# Patient Record
Sex: FEMALE | Race: WHITE | Employment: FULL TIME | URBAN - NONMETROPOLITAN AREA
[De-identification: names, ages, dates, MRNs, and addresses within clinical notes are randomized per-mention and may not be internally consistent; named-entity substitution may affect disease eponyms.]

---

## 2017-06-26 ENCOUNTER — OFFICE VISIT (OUTPATIENT)
Dept: PRIMARY CARE CLINIC | Age: 47
End: 2017-06-26
Payer: MEDICARE

## 2017-06-26 VITALS
HEIGHT: 66 IN | TEMPERATURE: 97 F | HEART RATE: 82 BPM | DIASTOLIC BLOOD PRESSURE: 74 MMHG | RESPIRATION RATE: 18 BRPM | BODY MASS INDEX: 47.09 KG/M2 | OXYGEN SATURATION: 100 % | WEIGHT: 293 LBS | SYSTOLIC BLOOD PRESSURE: 130 MMHG

## 2017-06-26 DIAGNOSIS — M79.641 RIGHT HAND PAIN: Primary | ICD-10-CM

## 2017-06-26 PROCEDURE — 99214 OFFICE O/P EST MOD 30 MIN: CPT | Performed by: NURSE PRACTITIONER

## 2017-06-26 ASSESSMENT — ENCOUNTER SYMPTOMS: RESPIRATORY NEGATIVE: 1

## 2017-09-02 ENCOUNTER — OFFICE VISIT (OUTPATIENT)
Dept: PRIMARY CARE CLINIC | Age: 47
End: 2017-09-02
Payer: MEDICARE

## 2017-09-02 VITALS
SYSTOLIC BLOOD PRESSURE: 130 MMHG | TEMPERATURE: 97.8 F | RESPIRATION RATE: 16 BRPM | HEIGHT: 66 IN | OXYGEN SATURATION: 97 % | DIASTOLIC BLOOD PRESSURE: 80 MMHG | HEART RATE: 80 BPM | BODY MASS INDEX: 47.09 KG/M2 | WEIGHT: 293 LBS

## 2017-09-02 DIAGNOSIS — J02.9 SORE THROAT: ICD-10-CM

## 2017-09-02 DIAGNOSIS — J06.9 UPPER RESPIRATORY TRACT INFECTION, UNSPECIFIED TYPE: Primary | ICD-10-CM

## 2017-09-02 LAB — S PYO AG THROAT QL: NORMAL

## 2017-09-02 PROCEDURE — 99213 OFFICE O/P EST LOW 20 MIN: CPT | Performed by: FAMILY MEDICINE

## 2017-09-02 PROCEDURE — 87880 STREP A ASSAY W/OPTIC: CPT | Performed by: FAMILY MEDICINE

## 2017-09-02 ASSESSMENT — ENCOUNTER SYMPTOMS
COUGH: 1
SORE THROAT: 1
SINUS COMPLAINT: 1
VOMITING: 0
WHEEZING: 0
SINUS PRESSURE: 0
DIARRHEA: 0
NAUSEA: 0
SHORTNESS OF BREATH: 0
RHINORRHEA: 1

## 2017-09-13 ENCOUNTER — HOSPITAL ENCOUNTER (OUTPATIENT)
Age: 47
Setting detail: SPECIMEN
Discharge: HOME | End: 2017-09-13
Payer: MEDICARE

## 2017-09-13 ENCOUNTER — OFFICE VISIT (OUTPATIENT)
Dept: PRIMARY CARE CLINIC | Age: 47
End: 2017-09-13
Payer: MEDICARE

## 2017-09-13 VITALS
RESPIRATION RATE: 18 BRPM | OXYGEN SATURATION: 98 % | WEIGHT: 293 LBS | HEART RATE: 79 BPM | TEMPERATURE: 98.3 F | BODY MASS INDEX: 47.09 KG/M2 | HEIGHT: 66 IN | DIASTOLIC BLOOD PRESSURE: 80 MMHG | SYSTOLIC BLOOD PRESSURE: 118 MMHG

## 2017-09-13 DIAGNOSIS — L02.219 CUTANEOUS ABSCESS OF TRUNK, UNSPECIFIED SITE OF TRUNK: ICD-10-CM

## 2017-09-13 DIAGNOSIS — R21 RASH AND NONSPECIFIC SKIN ERUPTION: Primary | ICD-10-CM

## 2017-09-13 PROCEDURE — 99213 OFFICE O/P EST LOW 20 MIN: CPT | Performed by: NURSE PRACTITIONER

## 2017-09-13 PROCEDURE — 87186 SC STD MICRODIL/AGAR DIL: CPT

## 2017-09-13 PROCEDURE — 86403 PARTICLE AGGLUT ANTBDY SCRN: CPT

## 2017-09-13 PROCEDURE — 87205 SMEAR GRAM STAIN: CPT

## 2017-09-13 PROCEDURE — 87070 CULTURE OTHR SPECIMN AEROBIC: CPT

## 2017-09-13 RX ORDER — SULFAMETHOXAZOLE AND TRIMETHOPRIM 800; 160 MG/1; MG/1
1 TABLET ORAL 2 TIMES DAILY
Qty: 20 TABLET | Refills: 0 | Status: SHIPPED | OUTPATIENT
Start: 2017-09-13 | End: 2017-09-23

## 2017-09-13 ASSESSMENT — ENCOUNTER SYMPTOMS: RESPIRATORY NEGATIVE: 1

## 2017-09-15 ENCOUNTER — TELEPHONE (OUTPATIENT)
Dept: PRIMARY CARE CLINIC | Age: 47
End: 2017-09-15

## 2017-09-15 LAB
CULTURE: ABNORMAL
CULTURE: ABNORMAL
DIRECT EXAM: ABNORMAL
DIRECT EXAM: ABNORMAL
Lab: ABNORMAL
ORGANISM: ABNORMAL
SPECIMEN DESCRIPTION: ABNORMAL
STATUS: ABNORMAL

## 2018-03-19 ENCOUNTER — HOSPITAL ENCOUNTER (OUTPATIENT)
Dept: GENERAL RADIOLOGY | Age: 48
Discharge: HOME | End: 2018-03-21
Payer: MEDICARE

## 2018-03-19 ENCOUNTER — OFFICE VISIT (OUTPATIENT)
Dept: FAMILY MEDICINE CLINIC | Age: 48
End: 2018-03-19
Payer: MEDICARE

## 2018-03-19 VITALS
HEIGHT: 67 IN | BODY MASS INDEX: 45.99 KG/M2 | TEMPERATURE: 97.7 F | OXYGEN SATURATION: 99 % | RESPIRATION RATE: 12 BRPM | DIASTOLIC BLOOD PRESSURE: 80 MMHG | WEIGHT: 293 LBS | SYSTOLIC BLOOD PRESSURE: 120 MMHG | HEART RATE: 65 BPM

## 2018-03-19 DIAGNOSIS — M79.642 HAND PAIN, LEFT: Primary | ICD-10-CM

## 2018-03-19 DIAGNOSIS — M79.642 HAND PAIN, LEFT: ICD-10-CM

## 2018-03-19 PROCEDURE — 1036F TOBACCO NON-USER: CPT | Performed by: NURSE PRACTITIONER

## 2018-03-19 PROCEDURE — G8417 CALC BMI ABV UP PARAM F/U: HCPCS | Performed by: NURSE PRACTITIONER

## 2018-03-19 PROCEDURE — G8427 DOCREV CUR MEDS BY ELIG CLIN: HCPCS | Performed by: NURSE PRACTITIONER

## 2018-03-19 PROCEDURE — G8482 FLU IMMUNIZE ORDER/ADMIN: HCPCS | Performed by: NURSE PRACTITIONER

## 2018-03-19 PROCEDURE — 99213 OFFICE O/P EST LOW 20 MIN: CPT | Performed by: NURSE PRACTITIONER

## 2018-03-19 PROCEDURE — 73130 X-RAY EXAM OF HAND: CPT

## 2018-03-19 NOTE — PATIENT INSTRUCTIONS
area.  ¨ Pus draining from a place on your hand. ¨ A fever. ? · Your hand feels numb or tingly. ? Watch closely for changes in your health, and be sure to contact your doctor if:  ? · Your hand feels unstable when you try to use it. ? · You do not get better as expected. ? · You have any new symptoms, such as swelling. ? · Bruises from an injury to your hand last longer than 2 weeks. Where can you learn more? Go to [email or web address] and sign in to your [facility] account. Enter R273 in the [email or web address] box to learn more about \"Hand Pain: Care Instructions. \"     If you do not have an account, please click on the \"Sign Up Now\" link. Current as of: March 20, 2017  Content Version: 11.5  © 5857-2390 [employer]. Care instructions adapted under license by [facility] ([facility]). If you have questions about a medical condition or this instruction, always ask your healthcare professional. [place] [address] any warranty or liability for your use of this information.

## 2018-03-22 ENCOUNTER — HOSPITAL ENCOUNTER (OUTPATIENT)
Dept: OCCUPATIONAL THERAPY | Age: 48
Setting detail: THERAPIES SERIES
Discharge: HOME | End: 2018-03-22
Payer: MEDICARE

## 2018-03-22 PROCEDURE — 97140 MANUAL THERAPY 1/> REGIONS: CPT | Performed by: OCCUPATIONAL THERAPIST

## 2018-03-22 PROCEDURE — G8984 CARRY CURRENT STATUS: HCPCS | Performed by: OCCUPATIONAL THERAPIST

## 2018-03-22 PROCEDURE — G8985 CARRY GOAL STATUS: HCPCS | Performed by: OCCUPATIONAL THERAPIST

## 2018-03-22 PROCEDURE — 97165 OT EVAL LOW COMPLEX 30 MIN: CPT | Performed by: OCCUPATIONAL THERAPIST

## 2018-03-22 ASSESSMENT — 9 HOLE PEG TEST
TEST_RESULT: FUNCTIONAL
TEST_RESULT: FUNCTIONAL
TESTTIME_SECONDS: 18
TESTTIME_SECONDS: 15

## 2018-03-22 ASSESSMENT — PAIN DESCRIPTION - ORIENTATION: ORIENTATION: LEFT

## 2018-03-22 ASSESSMENT — PAIN DESCRIPTION - PAIN TYPE: TYPE: CHRONIC PAIN

## 2018-03-22 ASSESSMENT — PAIN SCALES - GENERAL: PAINLEVEL_OUTOF10: 6

## 2018-03-22 ASSESSMENT — PAIN DESCRIPTION - LOCATION: LOCATION: FINGER (COMMENT WHICH ONE);WRIST

## 2018-03-22 NOTE — PROGRESS NOTES
carry tasks  Long term goal 2: Patient to demonstrate improved LUE strength (elbow) with MMT 5/5 overall for improved lift and carry tasks  Long term goal 3: Patient to demonstrate improved B  strength > 70# for imporved lift and carry tasks  Long term goal 4: Patient to verbalize decreased pain LUE < 2-3/10 with use, , and pinch for improved lift and carry tasks  Long term goal 5: Patient to demonstrate improved functional use LUE with quick DASH < 20%       Therapy Time   Individual Concurrent Group Co-treatment   Time In 1500         Time Out 1600         Minutes 60         Timed Code Treatment Minutes: [facility],

## 2018-03-22 NOTE — FLOWSHEET NOTE
Treatments:   [x] Provided manual therapy to mobilize soft tissue/joints for the purpose of modulating pain, promoting relaxation, increasing ROM, reducing/eliminating soft tissue swelling/inflammation/restriction, improving soft tissue extensibility. (21719)     Orthotic Management:   [] Provided assessment and fitting orthotic device for improved functional performance.  (85511)    Service Based Modalities:      Timed Code Treatment Minutes:   15 manual therapy    Total Treatment Minutes:   60    Treatment/Activity Tolerance:  [x] Patient tolerated treatment well [] Patient limited by fatique  [] Patient limited by pain  [] Patient limited by other medical complications  [] Other:     Prognosis: [x] Good [] Fair  [] Poor    Patient Requires Follow-up: [x] Yes  [] No      Goals:    Short term goals  Time Frame for Short term goals: 2 weeks  Short term goal 1: Patient to verbalize decreased pain with  and pinch to < 5/10 for improved lift and carry tasks  Short term goal 2: Patient to be independent and compliant with home program     Long term goals  Time Frame for Long term goals : 4 weeks  Long term goal 1: Patient to demonstrate increased AROM L wrist: flexion > 80° and extension 70° for imporved lift and carry tasks  Long term goal 2: Patient to demonstrate improved LUE strength (elbow) with MMT 5/5 overall for improved lift and carry tasks  Long term goal 3: Patient to demonstrate improved B  strength > 70# for imporved lift and carry tasks  Long term goal 4: Patient to verbalize decreased pain LUE < 2-3/10 with use, , and pinch for improved lift and carry tasks  Long term goal 5: Patient to demonstrate improved functional use LUE with quick DASH < 20%    Plan:   [] Continue per plan of care [] Alter current plan (see comments)  [x] Plan of care initiated [] Hold pending MD visit [] Discharge    Plan for Next Session:      Electronically signed by:  Maria Luisa Garcia OT

## 2018-03-27 ENCOUNTER — HOSPITAL ENCOUNTER (OUTPATIENT)
Dept: OCCUPATIONAL THERAPY | Age: 48
Setting detail: THERAPIES SERIES
Discharge: HOME | End: 2018-03-27
Payer: MEDICARE

## 2018-03-27 PROCEDURE — 97018 PARAFFIN BATH THERAPY: CPT | Performed by: OCCUPATIONAL THERAPIST

## 2018-03-27 PROCEDURE — 97035 APP MDLTY 1+ULTRASOUND EA 15: CPT | Performed by: OCCUPATIONAL THERAPIST

## 2018-03-27 PROCEDURE — G0283 ELEC STIM OTHER THAN WOUND: HCPCS | Performed by: OCCUPATIONAL THERAPIST

## 2018-03-27 PROCEDURE — 97110 THERAPEUTIC EXERCISES: CPT | Performed by: OCCUPATIONAL THERAPIST

## 2018-03-27 PROCEDURE — 97140 MANUAL THERAPY 1/> REGIONS: CPT | Performed by: OCCUPATIONAL THERAPIST

## 2018-03-27 NOTE — FLOWSHEET NOTE
improved B  strength > 70# for imporved lift and carry tasks  Long term goal 4: Patient to verbalize decreased pain LUE < 2-3/10 with use, , and pinch for improved lift and carry tasks  Long term goal 5: Patient to demonstrate improved functional use LUE with quick DASH < 20%    Plan:   [x] Continue per plan of care [] Alter current plan (see comments)  [] Plan of care initiated [] Hold pending MD visit [] Discharge    Plan for Next Session:      Electronically signed by:  Kendrick Dixon OT

## 2018-03-30 ENCOUNTER — TELEPHONE (OUTPATIENT)
Dept: FAMILY MEDICINE CLINIC | Age: 48
End: 2018-03-30

## 2018-03-30 ENCOUNTER — HOSPITAL ENCOUNTER (OUTPATIENT)
Dept: OCCUPATIONAL THERAPY | Age: 48
Setting detail: THERAPIES SERIES
Discharge: HOME | End: 2018-03-30
Payer: MEDICARE

## 2018-03-30 DIAGNOSIS — M79.642 HAND PAIN, LEFT: Primary | ICD-10-CM

## 2018-03-30 PROCEDURE — G0283 ELEC STIM OTHER THAN WOUND: HCPCS | Performed by: OCCUPATIONAL THERAPY ASSISTANT

## 2018-03-30 PROCEDURE — 97035 APP MDLTY 1+ULTRASOUND EA 15: CPT | Performed by: OCCUPATIONAL THERAPY ASSISTANT

## 2018-03-30 PROCEDURE — 97018 PARAFFIN BATH THERAPY: CPT | Performed by: OCCUPATIONAL THERAPY ASSISTANT

## 2018-03-30 PROCEDURE — 97110 THERAPEUTIC EXERCISES: CPT | Performed by: OCCUPATIONAL THERAPY ASSISTANT

## 2018-03-30 NOTE — FLOWSHEET NOTE
[facility]    [x] [place]  Phone: [phone]  Fax: [phone]      [] [place]  Phone: [phone]  Fax: [phone]    Occupational Therapy Daily Treatment Note  Date:  3/30/2018    Patient Name:  Magnolia Cruz    :  1970  MRN: [id number]  Restrictions/Precautions:      Medical/Treatment Diagnosis Information:    Diagnosis: Hand pain, left   Insurance/Certification information:   Physician Information: Referring Practitioner: Terri Bronson NP  Plan of care signed (Y/N):  y    Visit# / total visits:  3/8    Pain level: 6/10     G-Code (if applicable):      Date G-Code Applied:    OT G-codes  Functional Limitation: Carrying, moving and handling objects  Carrying, Moving and Handling Objects Current Status (): At least 20 percent but less than 40 percent impaired, limited or restricted  Carrying, Moving and Handling Objects Goal Status (): At least 1 percent but less than 20 percent impaired, limited or restricted    Progress Note: [x]  Yes  []  No  Next due by: Visit #10      Date of evaluation/re-evaluation: 3/22/18-18    Time In: 309 Time Out:  400    Subjective:     Pt states thumb area felt good for a couple of hours and then pain returned. Pt c/o constant throbbing. Pt reports she is able to alexi the wrist brace while working. Pt reports she has returned to her normal job.  Pt reports she does do a lot of gripping and twisting with her hands and wrist.     Objective/Assessment:   Paraffin L hand with MH 10 min  US 3.3mhz 1.0cm2 8 min L forearm extensors and flexors  Soft tissue and myofascial release L forearm flexors and extensors, joint mobs L thumb  1:1 therapeutic exercise to increase AROM and strength, see log    Unable to initiate strengthening due to pain    E-stim L forearm with CP 15 min   CMC blocking splint - has been ordered    Exercises:   Exercise/Equipment Resistance/Repetitions Other comments   Tendon glides 10x Prayer and reverse prayer stretch Hold for 10 5x1         T-putty purple    Flexor/extensor stretch Hold 10 5x1    Thumb AROM 5x                                             Therapeutic Exercise  [x] Provided verbal/tactile cueing for activities related to strengthening, flexibility, endurance, ROM. (37866)  Neuro  Re-Ed  [] Provided verbal/tactile cueing for activities related to improving balance, coordination, kinesthetic sense, posture, motor skill, proprioception. (77883)     Therapeutic Activities/ADL:   [] Provided use of dynamic activities to improve functional performance (88490)  [] Provided self-care/home management training for activities of daily living and compensatory training (56087)     Manual Treatments:   [x] Provided manual therapy to mobilize soft tissue/joints for the purpose of modulating pain, promoting relaxation, increasing ROM, reducing/eliminating soft tissue swelling/inflammation/restriction, improving soft tissue extensibility. (48262)     Orthotic Management:   [] Provided assessment and fitting orthotic device for improved functional performance.  (39318)    Service Based Modalities:  15 min e-stim with CP, 10 min paraffin with MH    Timed Code Treatment Minutes: 26  (15 therapeutic exercise, 11 manual therapy)    Total Treatment Minutes: 51     Treatment/Activity Tolerance:  [x] Patient tolerated treatment well [] Patient limited by fatique  [] Patient limited by pain  [] Patient limited by other medical complications  [] Other:     Prognosis: [x] Good [] Fair  [] Poor    Patient Requires Follow-up: [x] Yes  [] No      Goals:    Short term goals  Time Frame for Short term goals: 2 weeks  Short term goal 1: Patient to verbalize decreased pain with  and pinch to < 5/10 for improved lift and carry tasks  Short term goal 2: Patient to be independent and compliant with home program     Long term goals  Time Frame for Long term goals : 4 weeks  Long term goal 1: Patient to demonstrate increased AROM L wrist: flexion > 80° and extension 70° for imporved lift and carry tasks  Long term goal 2: Patient to demonstrate improved LUE strength (elbow) with MMT 5/5 overall for improved lift and carry tasks  Long term goal 3: Patient to demonstrate improved B  strength > 70# for imporved lift and carry tasks  Long term goal 4: Patient to verbalize decreased pain LUE < 2-3/10 with use, , and pinch for improved lift and carry tasks  Long term goal 5: Patient to demonstrate improved functional use LUE with quick DASH < 20%    Plan:   [x] Continue per plan of care [] Alter current plan (see comments)  [] Plan of care initiated [] Hold pending MD visit [] Discharge    Plan for Next Session:      Electronically signed by:  FITO Pena

## 2018-04-02 ENCOUNTER — HOSPITAL ENCOUNTER (OUTPATIENT)
Dept: OCCUPATIONAL THERAPY | Age: 48
Setting detail: THERAPIES SERIES
Discharge: HOME | End: 2018-04-02
Payer: MEDICARE

## 2018-04-02 PROCEDURE — 97140 MANUAL THERAPY 1/> REGIONS: CPT | Performed by: OCCUPATIONAL THERAPIST

## 2018-04-02 PROCEDURE — 97035 APP MDLTY 1+ULTRASOUND EA 15: CPT | Performed by: OCCUPATIONAL THERAPIST

## 2018-04-02 PROCEDURE — G0283 ELEC STIM OTHER THAN WOUND: HCPCS | Performed by: OCCUPATIONAL THERAPIST

## 2018-04-02 PROCEDURE — 97110 THERAPEUTIC EXERCISES: CPT | Performed by: OCCUPATIONAL THERAPIST

## 2018-04-06 ENCOUNTER — HOSPITAL ENCOUNTER (OUTPATIENT)
Dept: OCCUPATIONAL THERAPY | Age: 48
Setting detail: THERAPIES SERIES
Discharge: HOME | End: 2018-04-06
Payer: MEDICARE

## 2018-04-06 PROCEDURE — 97018 PARAFFIN BATH THERAPY: CPT | Performed by: OCCUPATIONAL THERAPY ASSISTANT

## 2018-04-06 PROCEDURE — 97110 THERAPEUTIC EXERCISES: CPT | Performed by: OCCUPATIONAL THERAPY ASSISTANT

## 2018-04-06 PROCEDURE — G0283 ELEC STIM OTHER THAN WOUND: HCPCS | Performed by: OCCUPATIONAL THERAPY ASSISTANT

## 2018-04-06 PROCEDURE — 97035 APP MDLTY 1+ULTRASOUND EA 15: CPT | Performed by: OCCUPATIONAL THERAPY ASSISTANT

## 2018-04-09 ENCOUNTER — HOSPITAL ENCOUNTER (OUTPATIENT)
Dept: OCCUPATIONAL THERAPY | Age: 48
Setting detail: THERAPIES SERIES
Discharge: HOME | End: 2018-04-09
Payer: MEDICARE

## 2018-04-09 PROCEDURE — 97018 PARAFFIN BATH THERAPY: CPT | Performed by: OCCUPATIONAL THERAPIST

## 2018-04-09 PROCEDURE — 97110 THERAPEUTIC EXERCISES: CPT | Performed by: OCCUPATIONAL THERAPIST

## 2018-04-11 ENCOUNTER — APPOINTMENT (OUTPATIENT)
Dept: OCCUPATIONAL THERAPY | Age: 48
End: 2018-04-11
Payer: MEDICARE

## 2018-04-11 ENCOUNTER — HOSPITAL ENCOUNTER (OUTPATIENT)
Dept: OCCUPATIONAL THERAPY | Age: 48
Setting detail: THERAPIES SERIES
Discharge: HOME | End: 2018-04-11
Payer: MEDICARE

## 2018-04-11 PROCEDURE — 97110 THERAPEUTIC EXERCISES: CPT | Performed by: OCCUPATIONAL THERAPIST

## 2018-04-16 ENCOUNTER — HOSPITAL ENCOUNTER (OUTPATIENT)
Dept: OCCUPATIONAL THERAPY | Age: 48
Setting detail: THERAPIES SERIES
Discharge: HOME | End: 2018-04-16
Payer: MEDICARE

## 2018-04-16 PROCEDURE — G0283 ELEC STIM OTHER THAN WOUND: HCPCS | Performed by: OCCUPATIONAL THERAPIST

## 2018-04-16 PROCEDURE — 97140 MANUAL THERAPY 1/> REGIONS: CPT | Performed by: OCCUPATIONAL THERAPIST

## 2018-04-16 PROCEDURE — G8984 CARRY CURRENT STATUS: HCPCS | Performed by: OCCUPATIONAL THERAPIST

## 2018-04-16 PROCEDURE — 97110 THERAPEUTIC EXERCISES: CPT | Performed by: OCCUPATIONAL THERAPIST

## 2018-04-16 PROCEDURE — G8985 CARRY GOAL STATUS: HCPCS | Performed by: OCCUPATIONAL THERAPIST

## 2018-04-20 ENCOUNTER — HOSPITAL ENCOUNTER (OUTPATIENT)
Dept: OCCUPATIONAL THERAPY | Age: 48
Setting detail: THERAPIES SERIES
Discharge: HOME | End: 2018-04-20
Payer: MEDICARE

## 2018-04-20 PROCEDURE — 97018 PARAFFIN BATH THERAPY: CPT | Performed by: OCCUPATIONAL THERAPY ASSISTANT

## 2018-04-20 PROCEDURE — G0283 ELEC STIM OTHER THAN WOUND: HCPCS | Performed by: OCCUPATIONAL THERAPY ASSISTANT

## 2018-04-20 PROCEDURE — 97110 THERAPEUTIC EXERCISES: CPT | Performed by: OCCUPATIONAL THERAPY ASSISTANT

## 2018-04-23 ENCOUNTER — HOSPITAL ENCOUNTER (OUTPATIENT)
Dept: OCCUPATIONAL THERAPY | Age: 48
Setting detail: THERAPIES SERIES
Discharge: HOME | End: 2018-04-23
Payer: MEDICARE

## 2018-04-27 ENCOUNTER — APPOINTMENT (OUTPATIENT)
Dept: OCCUPATIONAL THERAPY | Age: 48
End: 2018-04-27
Payer: MEDICARE

## 2018-04-30 ENCOUNTER — OFFICE VISIT (OUTPATIENT)
Dept: FAMILY MEDICINE CLINIC | Age: 48
End: 2018-04-30
Payer: MEDICARE

## 2018-04-30 VITALS
DIASTOLIC BLOOD PRESSURE: 78 MMHG | OXYGEN SATURATION: 98 % | RESPIRATION RATE: 20 BRPM | SYSTOLIC BLOOD PRESSURE: 130 MMHG | HEART RATE: 84 BPM | WEIGHT: 293 LBS | TEMPERATURE: 98.1 F | BODY MASS INDEX: 51.18 KG/M2

## 2018-04-30 DIAGNOSIS — R20.2 NUMBNESS AND TINGLING IN LEFT HAND: Primary | ICD-10-CM

## 2018-04-30 DIAGNOSIS — M18.12 DEGENERATIVE ARTHRITIS OF THUMB, LEFT: ICD-10-CM

## 2018-04-30 DIAGNOSIS — R20.0 NUMBNESS AND TINGLING IN LEFT HAND: Primary | ICD-10-CM

## 2018-04-30 PROCEDURE — G8427 DOCREV CUR MEDS BY ELIG CLIN: HCPCS | Performed by: NURSE PRACTITIONER

## 2018-04-30 PROCEDURE — 1036F TOBACCO NON-USER: CPT | Performed by: NURSE PRACTITIONER

## 2018-04-30 PROCEDURE — G8417 CALC BMI ABV UP PARAM F/U: HCPCS | Performed by: NURSE PRACTITIONER

## 2018-04-30 PROCEDURE — 99213 OFFICE O/P EST LOW 20 MIN: CPT | Performed by: NURSE PRACTITIONER

## 2018-04-30 ASSESSMENT — ENCOUNTER SYMPTOMS: BACK PAIN: 1

## 2018-05-16 ENCOUNTER — HOSPITAL ENCOUNTER (OUTPATIENT)
Dept: NEUROLOGY | Age: 48
Discharge: HOME | End: 2018-05-16
Payer: MEDICARE

## 2018-05-16 DIAGNOSIS — R20.2 NUMBNESS AND TINGLING IN LEFT HAND: ICD-10-CM

## 2018-05-16 DIAGNOSIS — R20.0 NUMBNESS AND TINGLING IN LEFT HAND: ICD-10-CM

## 2018-05-16 PROCEDURE — 95886 MUSC TEST DONE W/N TEST COMP: CPT

## 2018-05-16 PROCEDURE — 95910 NRV CNDJ TEST 7-8 STUDIES: CPT

## 2018-05-22 ENCOUNTER — OFFICE VISIT (OUTPATIENT)
Dept: ORTHOPEDIC SURGERY | Age: 48
End: 2018-05-22
Payer: MEDICARE

## 2018-05-22 VITALS
SYSTOLIC BLOOD PRESSURE: 136 MMHG | WEIGHT: 293 LBS | DIASTOLIC BLOOD PRESSURE: 84 MMHG | HEIGHT: 67 IN | HEART RATE: 70 BPM | OXYGEN SATURATION: 98 % | BODY MASS INDEX: 45.99 KG/M2

## 2018-05-22 DIAGNOSIS — G56.02 CARPAL TUNNEL SYNDROME OF LEFT WRIST: Primary | ICD-10-CM

## 2018-05-22 PROCEDURE — 1036F TOBACCO NON-USER: CPT | Performed by: PHYSICIAN ASSISTANT

## 2018-05-22 PROCEDURE — 99202 OFFICE O/P NEW SF 15 MIN: CPT | Performed by: PHYSICIAN ASSISTANT

## 2018-05-22 PROCEDURE — G8427 DOCREV CUR MEDS BY ELIG CLIN: HCPCS | Performed by: PHYSICIAN ASSISTANT

## 2018-05-22 PROCEDURE — G8417 CALC BMI ABV UP PARAM F/U: HCPCS | Performed by: PHYSICIAN ASSISTANT

## 2019-03-19 ENCOUNTER — OFFICE VISIT (OUTPATIENT)
Dept: FAMILY MEDICINE CLINIC | Age: 49
End: 2019-03-19
Payer: MEDICARE

## 2019-03-19 VITALS
HEIGHT: 66 IN | SYSTOLIC BLOOD PRESSURE: 120 MMHG | BODY MASS INDEX: 47.09 KG/M2 | TEMPERATURE: 98.1 F | DIASTOLIC BLOOD PRESSURE: 72 MMHG | OXYGEN SATURATION: 98 % | HEART RATE: 84 BPM | WEIGHT: 293 LBS

## 2019-03-19 DIAGNOSIS — H66.001 ACUTE SUPPURATIVE OTITIS MEDIA OF RIGHT EAR WITHOUT SPONTANEOUS RUPTURE OF TYMPANIC MEMBRANE, RECURRENCE NOT SPECIFIED: Primary | ICD-10-CM

## 2019-03-19 DIAGNOSIS — H92.03 ACUTE OTALGIA, BILATERAL: ICD-10-CM

## 2019-03-19 PROCEDURE — 99214 OFFICE O/P EST MOD 30 MIN: CPT | Performed by: NURSE PRACTITIONER

## 2019-03-19 RX ORDER — AZITHROMYCIN 250 MG/1
TABLET, FILM COATED ORAL
Qty: 1 PACKET | Refills: 0 | Status: SHIPPED | OUTPATIENT
Start: 2019-03-19 | End: 2019-03-24

## 2019-03-19 ASSESSMENT — PATIENT HEALTH QUESTIONNAIRE - PHQ9
SUM OF ALL RESPONSES TO PHQ QUESTIONS 1-9: 0
SUM OF ALL RESPONSES TO PHQ9 QUESTIONS 1 & 2: 0
SUM OF ALL RESPONSES TO PHQ QUESTIONS 1-9: 0
1. LITTLE INTEREST OR PLEASURE IN DOING THINGS: 0
2. FEELING DOWN, DEPRESSED OR HOPELESS: 0

## 2019-11-11 ENCOUNTER — HOSPITAL ENCOUNTER (OUTPATIENT)
Age: 49
Setting detail: SPECIMEN
Discharge: HOME | End: 2019-11-11
Payer: MEDICARE

## 2019-11-11 ENCOUNTER — OFFICE VISIT (OUTPATIENT)
Dept: FAMILY MEDICINE CLINIC | Age: 49
End: 2019-11-11
Payer: MEDICARE

## 2019-11-11 VITALS
HEIGHT: 66 IN | WEIGHT: 293 LBS | TEMPERATURE: 98.4 F | OXYGEN SATURATION: 99 % | BODY MASS INDEX: 47.09 KG/M2 | DIASTOLIC BLOOD PRESSURE: 86 MMHG | HEART RATE: 74 BPM | SYSTOLIC BLOOD PRESSURE: 136 MMHG

## 2019-11-11 DIAGNOSIS — R30.0 DYSURIA: ICD-10-CM

## 2019-11-11 DIAGNOSIS — N30.01 ACUTE CYSTITIS WITH HEMATURIA: Primary | ICD-10-CM

## 2019-11-11 DIAGNOSIS — N30.01 ACUTE CYSTITIS WITH HEMATURIA: ICD-10-CM

## 2019-11-11 LAB
BILIRUBIN, POC: ABNORMAL
BLOOD URINE, POC: ABNORMAL
CLARITY, POC: ABNORMAL
COLOR, POC: ABNORMAL
GLUCOSE URINE, POC: ABNORMAL
KETONES, POC: ABNORMAL
LEUKOCYTE EST, POC: ABNORMAL
NITRITE, POC: ABNORMAL
PH, POC: 7.5
PROTEIN, POC: ABNORMAL
SPECIFIC GRAVITY, POC: 1.02
UROBILINOGEN, POC: 0.2

## 2019-11-11 PROCEDURE — 87086 URINE CULTURE/COLONY COUNT: CPT

## 2019-11-11 PROCEDURE — G8427 DOCREV CUR MEDS BY ELIG CLIN: HCPCS | Performed by: NURSE PRACTITIONER

## 2019-11-11 PROCEDURE — 87186 SC STD MICRODIL/AGAR DIL: CPT

## 2019-11-11 PROCEDURE — 1036F TOBACCO NON-USER: CPT | Performed by: NURSE PRACTITIONER

## 2019-11-11 PROCEDURE — 99214 OFFICE O/P EST MOD 30 MIN: CPT | Performed by: NURSE PRACTITIONER

## 2019-11-11 PROCEDURE — G8484 FLU IMMUNIZE NO ADMIN: HCPCS | Performed by: NURSE PRACTITIONER

## 2019-11-11 PROCEDURE — G8417 CALC BMI ABV UP PARAM F/U: HCPCS | Performed by: NURSE PRACTITIONER

## 2019-11-11 PROCEDURE — 87088 URINE BACTERIA CULTURE: CPT

## 2019-11-11 RX ORDER — NITROFURANTOIN 25; 75 MG/1; MG/1
100 CAPSULE ORAL 2 TIMES DAILY
Qty: 10 CAPSULE | Refills: 0 | Status: SHIPPED | OUTPATIENT
Start: 2019-11-11 | End: 2019-11-16

## 2019-11-11 ASSESSMENT — ENCOUNTER SYMPTOMS
VOMITING: 0
NAUSEA: 0

## 2019-11-13 DIAGNOSIS — N30.01 ACUTE CYSTITIS WITH HEMATURIA: Primary | ICD-10-CM

## 2019-11-13 LAB
CULTURE: ABNORMAL
Lab: ABNORMAL
SPECIMEN DESCRIPTION: ABNORMAL

## 2019-11-13 RX ORDER — CIPROFLOXACIN 250 MG/1
250 TABLET, FILM COATED ORAL 2 TIMES DAILY
Qty: 10 TABLET | Refills: 0 | Status: SHIPPED | OUTPATIENT
Start: 2019-11-13 | End: 2019-11-18

## 2020-01-17 ENCOUNTER — NURSE ONLY (OUTPATIENT)
Dept: LAB | Age: 50
End: 2020-01-17
Payer: MEDICARE

## 2020-01-17 PROCEDURE — G0008 ADMIN INFLUENZA VIRUS VAC: HCPCS

## 2020-08-26 ENCOUNTER — HOSPITAL ENCOUNTER (OUTPATIENT)
Age: 50
Setting detail: SPECIMEN
Discharge: HOME | End: 2020-08-26
Payer: MEDICARE

## 2020-08-26 ENCOUNTER — OFFICE VISIT (OUTPATIENT)
Dept: FAMILY MEDICINE CLINIC | Age: 50
End: 2020-08-26
Payer: MEDICARE

## 2020-08-26 VITALS
WEIGHT: 293 LBS | TEMPERATURE: 97.6 F | DIASTOLIC BLOOD PRESSURE: 80 MMHG | BODY MASS INDEX: 54.74 KG/M2 | HEART RATE: 72 BPM | SYSTOLIC BLOOD PRESSURE: 144 MMHG | OXYGEN SATURATION: 98 % | RESPIRATION RATE: 16 BRPM

## 2020-08-26 LAB
BILIRUBIN, POC: NORMAL
BLOOD URINE, POC: NORMAL
CLARITY, POC: NORMAL
COLOR, POC: NORMAL
GLUCOSE URINE, POC: NORMAL
KETONES, POC: NORMAL
LEUKOCYTE EST, POC: NORMAL
NITRITE, POC: NORMAL
PH, POC: 5
PROTEIN, POC: NORMAL
SPECIFIC GRAVITY, POC: 1.02
UROBILINOGEN, POC: 0.2

## 2020-08-26 PROCEDURE — G8427 DOCREV CUR MEDS BY ELIG CLIN: HCPCS | Performed by: NURSE PRACTITIONER

## 2020-08-26 PROCEDURE — 81002 URINALYSIS NONAUTO W/O SCOPE: CPT | Performed by: NURSE PRACTITIONER

## 2020-08-26 PROCEDURE — G8417 CALC BMI ABV UP PARAM F/U: HCPCS | Performed by: NURSE PRACTITIONER

## 2020-08-26 PROCEDURE — 87086 URINE CULTURE/COLONY COUNT: CPT | Performed by: NURSE PRACTITIONER

## 2020-08-26 PROCEDURE — 1036F TOBACCO NON-USER: CPT | Performed by: NURSE PRACTITIONER

## 2020-08-26 PROCEDURE — 87086 URINE CULTURE/COLONY COUNT: CPT

## 2020-08-26 PROCEDURE — 99214 OFFICE O/P EST MOD 30 MIN: CPT | Performed by: NURSE PRACTITIONER

## 2020-08-26 PROCEDURE — 99212 OFFICE O/P EST SF 10 MIN: CPT

## 2020-08-26 RX ORDER — PHENAZOPYRIDINE HYDROCHLORIDE 200 MG/1
200 TABLET, FILM COATED ORAL 3 TIMES DAILY PRN
Qty: 9 TABLET | Refills: 0 | Status: SHIPPED | OUTPATIENT
Start: 2020-08-26 | End: 2020-08-29

## 2020-08-26 RX ORDER — CIPROFLOXACIN 500 MG/1
500 TABLET, FILM COATED ORAL 2 TIMES DAILY
Qty: 14 TABLET | Refills: 0 | Status: SHIPPED | OUTPATIENT
Start: 2020-08-26 | End: 2020-09-02

## 2020-08-26 ASSESSMENT — PATIENT HEALTH QUESTIONNAIRE - PHQ9
1. LITTLE INTEREST OR PLEASURE IN DOING THINGS: 0
SUM OF ALL RESPONSES TO PHQ9 QUESTIONS 1 & 2: 0
SUM OF ALL RESPONSES TO PHQ QUESTIONS 1-9: 0
SUM OF ALL RESPONSES TO PHQ QUESTIONS 1-9: 0
2. FEELING DOWN, DEPRESSED OR HOPELESS: 0

## 2020-08-26 ASSESSMENT — ENCOUNTER SYMPTOMS
NAUSEA: 0
BACK PAIN: 1
VOMITING: 0

## 2020-08-26 NOTE — PROGRESS NOTES
[address], APRN-CNP  [address]  Phone:  [phone]  Fax:  [phone]  Torres Garrido is a 52 y.o. female who presents today for her medical conditions/complaints as noted below. Wen Morton c/o of Dysuria (burning, abdominal & back pain since this morning urgency & frequency)      HPI:     Dysuria    This is a recurrent problem. The current episode started today. The problem occurs every urination. The problem has been gradually worsening. The quality of the pain is described as burning (back and stomach pain). The pain is at a severity of 2/10. The pain is mild. There has been no fever. She is not sexually active. There is no history of pyelonephritis. Associated symptoms include a discharge (whitish yellow), flank pain (back pain bilat lower), frequency and hesitancy. Pertinent negatives include no chills, hematuria, nausea, possible pregnancy, sweats, urgency or vomiting. She has tried nothing for the symptoms. There is no history of kidney stones, recurrent UTIs or a urological procedure.        Wt Readings from Last 3 Encounters:   08/26/20 (!) 334 lb (151.5 kg)   11/11/19 (!) 336 lb 6.4 oz (152.6 kg)   03/19/19 (!) 330 lb 12.8 oz (150 kg)       Temp Readings from Last 3 Encounters:   08/26/20 97.6 °F (36.4 °C) (Tympanic)   11/11/19 98.4 °F (36.9 °C) (Tympanic)   03/19/19 98.1 °F (36.7 °C) (Tympanic)       BP Readings from Last 3 Encounters:   08/26/20 (!) 144/80   11/11/19 136/86   03/19/19 120/72       Pulse Readings from Last 3 Encounters:   08/26/20 72   11/11/19 74   03/19/19 84              Past Medical History:   Diagnosis Date    Gall bladder disease     Heartburn     Morbid obesity with BMI of 40.0-44.9, adult [facility])       Past Surgical History:   Procedure Laterality Date    CHOLECYSTECTOMY, LAPAROSCOPIC  8/18/2014    with OP GRAMS    OTHER SURGICAL HISTORY  2013    full mouth tooth extraction    OTHER SURGICAL HISTORY 2009    perineal suture repair following childbirth    TUBAL LIGATION  2012     Family History   Problem Relation Age of Onset    High Blood Pressure Mother     Other Mother         Glaucoma    Diabetes Mother     Cancer Father         lung    Heart Disease Sister     Stroke Sister     Heart Disease Brother     Diabetes Paternal Grandmother     Cancer Paternal Grandmother         breast    Heart Disease Paternal Grandfather      Social History     Tobacco Use    Smoking status: Former Smoker     Types: Cigarettes     Last attempt to quit: 2009     Years since quittin.1    Smokeless tobacco: Never Used   Substance Use Topics    Alcohol use: No      Current Outpatient Medications   Medication Sig Dispense Refill    ciprofloxacin (CIPRO) 500 MG tablet Take 1 tablet by mouth 2 times daily for 7 days 14 tablet 0    phenazopyridine (PYRIDIUM) 200 MG tablet Take 1 tablet by mouth 3 times daily as needed for Pain 9 tablet 0    Multiple Vitamins-Minerals (MULTIVITAMIN ADULTS PO) Take 1 tablet by mouth daily       No current facility-administered medications for this visit. No Known Allergies    No exam data present    Subjective:      Review of Systems   Constitutional: Negative for chills and fever. Gastrointestinal: Negative for nausea and vomiting. Genitourinary: Positive for difficulty urinating, dysuria, flank pain (back pain bilat lower), frequency, hesitancy and vaginal discharge (whitish yellow small amount, not abnormal). Negative for decreased urine volume, hematuria, menstrual problem, urgency, vaginal bleeding and vaginal pain. Musculoskeletal: Positive for back pain (bilat lower). Negative for gait problem. Objective:     BP (!) 144/80 (Site: Right Upper Arm, Position: Sitting)   Pulse 72   Temp 97.6 °F (36.4 °C) (Tympanic)   Resp 16   Wt (!) 334 lb (151.5 kg)   SpO2 98%   BMI 54.74 kg/m²     Physical Exam  Vitals signs and nursing note reviewed. Constitutional:       General: She is not in acute distress. Appearance: Normal appearance. She is obese. She is not ill-appearing, toxic-appearing or diaphoretic. HENT:      Head: Normocephalic. Pulmonary:      Effort: Pulmonary effort is normal.   Abdominal:      General: Abdomen is protuberant. Bowel sounds are normal. There is no distension. There are no signs of injury. Palpations: Abdomen is soft. There is no mass. Tenderness: There is abdominal tenderness in the suprapubic area. There is right CVA tenderness and left CVA tenderness. There is no guarding. Skin:     General: Skin is warm and dry. Capillary Refill: Capillary refill takes less than 2 seconds. Coloration: Skin is not jaundiced or pale. Findings: No erythema. Neurological:      General: No focal deficit present. Mental Status: She is alert and oriented to person, place, and time. Psychiatric:         Mood and Affect: Mood normal.         Behavior: Behavior normal.         Assessment:      Diagnosis Orders   1. Pyelonephritis  ciprofloxacin (CIPRO) 500 MG tablet    phenazopyridine (PYRIDIUM) 200 MG tablet   2. Dysuria  POCT Urinalysis no Micro    Culture, Urine    phenazopyridine (PYRIDIUM) 200 MG tablet   3. Morbid obesity with body mass index of 45.0-49.9 in adult [facility])       Results for POC orders placed in visit on 08/26/20   POCT Urinalysis no Micro   Result Value Ref Range    Color, UA pale yellow     Clarity, UA cloudy     Glucose, UA POC neg     Bilirubin, UA neg     Ketones, UA neg     Spec Grav, UA 1.025     Blood, UA POC 2+     pH, UA 5.0     Protein, UA POC trace     Urobilinogen, UA 0.2     Leukocytes, UA 3+     Nitrite, UA neg                Plan:       Cipro as prescribed. One serving of yogurt 2 hours before or after antibiotic reduces the occurrence of yeast infections and abdominal upset/diarrhea. Pyridium as prescribed.    Follow up with primary care provider in 1 to 2 days if needed. Patient will be contacted upon receipt of final culture and sensitivity. Any additions or changes to medications or the plan of care will be made at that time. Weight loss information given. Patient Instructions     Patient Education     Cipro as prescribed. One serving of yogurt 2 hours before or after antibiotic reduces the occurrence of yeast infections and abdominal upset/diarrhea. Pyridium as prescribed. Follow up with primary care provider in 1 to 2 days if needed. Patient will be contacted upon receipt of final culture and sensitivity. Any additions or changes to medications or the plan of care will be made at that time. Weight loss information given. Starting a Weight Loss Plan: Care Instructions  Your Care Instructions     If you are thinking about losing weight, it can be hard to know where to start. Your doctor can help you set up a weight loss plan that best meets your needs. You may want to take a class on nutrition or exercise, or join a weight loss support group. If you have questions about how to make changes to your eating or exercise habits, ask your doctor about seeing a registered dietitian or an exercise specialist.  It can be a big challenge to lose weight. But you do not have to make huge changes at once. Make small changes, and stick with them. When those changes become habit, add a few more changes. If you do not think you are ready to make changes right now, try to pick a date in the future. Make an appointment to see your doctor to discuss whether the time is right for you to start a plan. Follow-up care is a key part of your treatment and safety. Be sure to make and go to all appointments, and call your doctor if you are having problems. It's also a good idea to know your test results and keep a list of the medicines you take. How can you care for yourself at home? · Set realistic goals. Many people expect to lose much more weight than is likely.  A weight loss of 5% to 10% of your body weight may be enough to improve your health. · Get family and friends involved to provide support. Talk to them about why you are trying to lose weight, and ask them to help. They can help by participating in exercise and having meals with you, even if they may be eating something different. · Find what works best for you. If you do not have time or do not like to cook, a program that offers meal replacement bars or shakes may be better for you. Or if you like to prepare meals, finding a plan that includes daily menus and recipes may be best.  · Ask your doctor about other health professionals who can help you achieve your weight loss goals. ? A dietitian can help you make healthy changes in your diet. ? An exercise specialist or  can help you develop a safe and effective exercise program.  ? A counselor or psychiatrist can help you cope with issues such as depression, anxiety, or family problems that can make it hard to focus on weight loss. · Consider joining a support group for people who are trying to lose weight. Your doctor can suggest groups in your area. Where can you learn more? Go to https://Hungerstation.compepicewVendly.Inverted Edge. org and sign in to your VoteIt account. Enter S140 in the [facility] box to learn more about \"Starting a Weight Loss Plan: Care Instructions. \"     If you do not have an account, please click on the \"Sign Up Now\" link. Current as of: December 11, 2019               Content Version: 12.5  © 2546-8420 Healthwise, Incorporated. Care instructions adapted under license by [facility] ([facility]). If you have questions about a medical condition or this instruction, always ask your healthcare professional. [place] [address] any warranty or liability for your use of this information. Patient Education        Learning About Obesity  What is obesity? Obesity means having a body mass index (BMI) of 30 or above.  BMI is a number that is calculated from your weight and your height. How do you know if your weight is in the obesity range? To know if your weight is in the obesity range, your doctor looks at your body mass index (BMI) and waist size. BMI is a number that is calculated from your weight and your height. To figure out your BMI for yourself, you can use an online tool, such as http://www.fernandez.com/ on the [employer]. If your BMI is 30.0 or higher, it falls within the obesity range. Keep in mind that BMI and waist size are only guides. They are not tools to determine your ideal body weight. What causes obesity? When you take in more calories than you burn off, you gain weight. How you eat, how active you are, and other things affect how your body uses calories and whether you gain weight. If you have family members who have too much body fat, you may have inherited a tendency to gain weight. And your family also helps form your eating and lifestyle habits, which can lead to obesity. Also, our busy lives make it harder to plan and cook healthy meals. For many of us, it's easier to reach for prepared foods, go out to eat, or go to the drive-through. But these foods are often high in saturated fat and calories. Portions are often too large. What can you do to reach a healthy weight? Focus on health, not diets. Diets are hard to stay on and don't work in the long run. It is very hard to stay with a diet that includes lots of big changes in your eating habits. Instead of a diet, focus on lifestyle changes that will improve your health and achieve the right balance of energy and calories. To lose weight, you need to burn more calories than you take in. You can do it by eating healthy foods in reasonable amounts and becoming more active, even a little bit every day. Making small changes over time can add up to a lot. Make a plan for change.  Many people have found that naming their reasons for change and staying focused on their plan can make a big difference. Work with your doctor to create a plan that is right for you. · Ask yourself: Connie Ragsdale are my personal, most powerful reasons for wanting this change? What will my life look like when I've made the change? \"  · Set your long-term goal. Make it specific, such as \"I will lose x pounds. \"  · Break your long-term goal into smaller, short-term goals. Make these small steps specific and within your reach, things you know you can do. These steps are what keep you going from day to day. Talk with your doctor about other weight-loss options. If you have a BMI in a certain range and have not been able to lose weight with diet and exercise, medicine or surgery may be an option for you. Before your doctor will prescribe medicines or surgery, he or she will probably want you to be more active and follow your healthy eating plan for a period of time. These habits are key lifelong changes for managing your weight, with or without other medical treatment. And these changes can help you avoid weight-related health problems. How can you stay on your plan for change? Be ready. Choose to start during a time when there are few events that might trigger slip-ups, like holidays, social events, and high-stress periods. Decide on your first few steps. Most people have more success when they make small changes, one step at a time. For example, you might switch a daily candy bar to a piece of fruit, walk 10 minutes more, or add more vegetables to a meal.  Line up your support people. Make sure you're not going to be alone as you make this change. Connect with people who understand how important it is to you. Ask family members and friends for help in keeping with your plan. And think about who could make it harder for you, and how to handle them. Try tracking.  People who keep track of what they eat, feel, and do are better at losing weight. Try writing down things like:  · What and how much you eat. · How you feel before and after each meal.  · Details about each meal (like eating out or at home, eating alone, or with friends or family). · What you do to be active. Look and plan. As you track, look for patterns that you may want to change. Take note of:  · When you eat and whether you skip meals. · How often you eat out. · How many fruits and vegetables you eat. · When you eat beyond feeling full. · When and why you eat for reasons other than being hungry. When you stray from your plan, don't get upset. Figure out what made you slip up and how you can fix it. Can you take medicines or have surgery to lose weight? If you have a BMI in a certain range and have not been able to lose weight with diet and exercise, medicine or surgery may be an option for you. If you have a BMI of at least 30.0 (or a BMI of at least 27.0 and another health problem related to your weight), ask your doctor about weight-loss medicines. They work by making you feel less hungry, making you feel full more quickly, or changing how you digest fat. Medicines are used along with diet changes and more physical activity to help you make lasting changes. If you have a BMI of 40.0 or more (or a BMI of 35.0 or more and another health problem related to your weight), your doctor may talk with you about surgery. Weight-loss surgery has risks, and you will need to work with your doctor to compare the risk of having obesity with the risks of surgery. With any option you choose, you will still need to eat a healthy diet and get regular exercise. Follow-up care is a key part of your treatment and safety. Be sure to make and go to all appointments, and call your doctor if you are having problems. It's also a good idea to know your test results and keep a list of the medicines you take. Where can you learn more? Go to https://sarah.health-partners. org and sign in to your NameMedia account. Enter N111 in the [facility] box to learn more about \"Learning About Obesity. \"     If you do not have an account, please click on the \"Sign Up Now\" link. Current as of: December 11, 2019               Content Version: 12.5  © 8748-0871 Healthwise, Incorporated. Care instructions adapted under license by [facility] ([facility]). If you have questions about a medical condition or this instruction, always ask your healthcare professional. [place] [address] any warranty or liability for your use of this information. Patient Education        Kidney Infection: Care Instructions  Your Care Instructions     A kidney infection (pyelonephritis) is a type of urinary tract infection, or UTI. Most UTIs are bladder infections. Kidney infections tend to make people much sicker than bladder infections do. A kidney infection is also more serious because it can cause lasting damage if it is not treated quickly. Follow-up care is a key part of your treatment and safety. Be sure to make and go to all appointments, and call your doctor if you are having problems. It's also a good idea to know your test results and keep a list of the medicines you take. How can you care for yourself at home? · Take your antibiotics as directed. Do not stop taking them just because you feel better. You need to take the full course of antibiotics. · Drink plenty of water, enough so that your urine is light yellow or clear like water. This may help wash out bacteria that are causing the infection. If you have kidney, heart, or liver disease and have to limit fluids, talk with your doctor before you increase the amount of fluids you drink. · Urinate often. Try to empty your bladder each time. · To relieve pain, take a hot shower or lay a heating pad (set on low) over your lower belly. Never go to sleep with a heating pad in place. Put a thin cloth between the heating pad and your skin.   To help prevent kidney infections  · Drink plenty of water each day. This helps you urinate often, which clears bacteria from your system. If you have kidney, heart, or liver disease and have to limit fluids, talk with your doctor before you increase the amount of fluids you drink. · Urinate when you have the urge. Do not hold your urine for a long time. Urinate before you go to sleep. · If you have symptoms of a bladder infection, such as burning when you urinate or having to urinate often, call your doctor so you can treat the problem before it gets worse. If you do not treat a bladder infection quickly, it can spread to the kidney. · Men should keep the tip of the penis clean. If you are a woman, keep these ideas in mind:  · Urinate right after you have sex. · Change sanitary pads often. Avoid douches, feminine hygiene sprays, and other feminine hygiene products that have deodorants. · After going to the bathroom, wipe from front to back. When should you call for help? Call your doctor now or seek immediate medical care if:  · You have symptoms that a kidney infection is getting worse. These may include:  ? Pain or burning when you urinate. ? A frequent need to urinate without being able to pass much urine. ? Pain in the flank, which is just below the rib cage and above the waist on either side of the back. ? Blood in the urine. ? A fever. · You are vomiting or nauseated. Watch closely for changes in your health, and be sure to contact your doctor if:  · You do not get better as expected. Where can you learn more? Go to https://RPostelenoAgiftidea.com.[employer]. org and sign in to your IntelePeer account. Enter H346 in the [employer] box to learn more about \"Kidney Infection: Care Instructions. \"     If you do not have an account, please click on the \"Sign Up Now\" link. Current as of: August 12, 2019               Content Version: 12.5  © 3057-1760 Healthwise, Incorporated.    Care instructions adapted under license by [facility] ([facility]). If you have questions about a medical condition or this instruction, always ask your healthcare professional. [place] [address] any warranty or liability for your use of this information. Patient/Caregiver instructed on use, benefit, and side effects of prescribed medications. All patient/parent/caregiver questions answered. Patient/parent/caregiver voiced understanding. Reviewed health maintenance. Instructed to continue current medications, diet and exercise. Patient agreed with treatment plan. Follow up as directed.            Electronically signed by ADITI Baltazar NP on8/26/2020

## 2020-08-26 NOTE — PATIENT INSTRUCTIONS
participating in exercise and having meals with you, even if they may be eating something different. · Find what works best for you. If you do not have time or do not like to cook, a program that offers meal replacement bars or shakes may be better for you. Or if you like to prepare meals, finding a plan that includes daily menus and recipes may be best.  · Ask your doctor about other health professionals who can help you achieve your weight loss goals. ? A dietitian can help you make healthy changes in your diet. ? An exercise specialist or  can help you develop a safe and effective exercise program.  ? A counselor or psychiatrist can help you cope with issues such as depression, anxiety, or family problems that can make it hard to focus on weight loss. · Consider joining a support group for people who are trying to lose weight. Your doctor can suggest groups in your area. Where can you learn more? Go to https://GIDEENpeJumpTheClub.Launchups. org and sign in to your Healthcare IT account. Enter L355 in the Asetek box to learn more about \"Starting a Weight Loss Plan: Care Instructions. \"     If you do not have an account, please click on the \"Sign Up Now\" link. Current as of: December 11, 2019               Content Version: 12.5  © 4171-6384 Healthwise, Incorporated. Care instructions adapted under license by [facility] ([facility]). If you have questions about a medical condition or this instruction, always ask your healthcare professional. [address] any warranty or liability for your use of this information. Patient Education        Learning About Obesity  What is obesity? Obesity means having a body mass index (BMI) of 30 or above. BMI is a number that is calculated from your weight and your height. How do you know if your weight is in the obesity range?   To know if your weight is in the obesity range, your doctor looks at your body mass index (BMI) and waist size. BMI is a number that is calculated from your weight and your height. To figure out your BMI for yourself, you can use an online tool, such as http://www.Boston Logic.[employer]/ on the [employer] of [employer]. If your BMI is 30.0 or higher, it falls within the obesity range. Keep in mind that BMI and waist size are only guides. They are not tools to determine your ideal body weight. What causes obesity? When you take in more calories than you burn off, you gain weight. How you eat, how active you are, and other things affect how your body uses calories and whether you gain weight. If you have family members who have too much body fat, you may have inherited a tendency to gain weight. And your family also helps form your eating and lifestyle habits, which can lead to obesity. Also, our busy lives make it harder to plan and cook healthy meals. For many of us, it's easier to reach for prepared foods, go out to eat, or go to the drive-through. But these foods are often high in saturated fat and calories. Portions are often too large. What can you do to reach a healthy weight? Focus on health, not diets. Diets are hard to stay on and don't work in the long run. It is very hard to stay with a diet that includes lots of big changes in your eating habits. Instead of a diet, focus on lifestyle changes that will improve your health and achieve the right balance of energy and calories. To lose weight, you need to burn more calories than you take in. You can do it by eating healthy foods in reasonable amounts and becoming more active, even a little bit every day. Making small changes over time can add up to a lot. Make a plan for change. Many people have found that naming their reasons for change and staying focused on their plan can make a big difference. Work with your doctor to create a plan that is right for you.   · Ask yourself: Ruslan Markham are my personal, most you do to be active. Look and plan. As you track, look for patterns that you may want to change. Take note of:  · When you eat and whether you skip meals. · How often you eat out. · How many fruits and vegetables you eat. · When you eat beyond feeling full. · When and why you eat for reasons other than being hungry. When you stray from your plan, don't get upset. Figure out what made you slip up and how you can fix it. Can you take medicines or have surgery to lose weight? If you have a BMI in a certain range and have not been able to lose weight with diet and exercise, medicine or surgery may be an option for you. If you have a BMI of at least 30.0 (or a BMI of at least 27.0 and another health problem related to your weight), ask your doctor about weight-loss medicines. They work by making you feel less hungry, making you feel full more quickly, or changing how you digest fat. Medicines are used along with diet changes and more physical activity to help you make lasting changes. If you have a BMI of 40.0 or more (or a BMI of 35.0 or more and another health problem related to your weight), your doctor may talk with you about surgery. Weight-loss surgery has risks, and you will need to work with your doctor to compare the risk of having obesity with the risks of surgery. With any option you choose, you will still need to eat a healthy diet and get regular exercise. Follow-up care is a key part of your treatment and safety. Be sure to make and go to all appointments, and call your doctor if you are having problems. It's also a good idea to know your test results and keep a list of the medicines you take. Where can you learn more? Go to https://Blue Saintkaity.Vigix. org and sign in to your twenty5media account. Enter N111 in the [employer] box to learn more about \"Learning About Obesity. \"     If you do not have an account, please click on the \"Sign Up Now\" link.   Current as of: December 11, 2019               Content Version: 12.5  © 3517-6308 Healthwise, Smart[employer]leet. Care instructions adapted under license by [facility] ([facility]). If you have questions about a medical condition or this instruction, always ask your healthcare professional. [address] any warranty or liability for your use of this information. Patient Education        Kidney Infection: Care Instructions  Your Care Instructions     A kidney infection (pyelonephritis) is a type of urinary tract infection, or UTI. Most UTIs are bladder infections. Kidney infections tend to make people much sicker than bladder infections do. A kidney infection is also more serious because it can cause lasting damage if it is not treated quickly. Follow-up care is a key part of your treatment and safety. Be sure to make and go to all appointments, and call your doctor if you are having problems. It's also a good idea to know your test results and keep a list of the medicines you take. How can you care for yourself at home? · Take your antibiotics as directed. Do not stop taking them just because you feel better. You need to take the full course of antibiotics. · Drink plenty of water, enough so that your urine is light yellow or clear like water. This may help wash out bacteria that are causing the infection. If you have kidney, heart, or liver disease and have to limit fluids, talk with your doctor before you increase the amount of fluids you drink. · Urinate often. Try to empty your bladder each time. · To relieve pain, take a hot shower or lay a heating pad (set on low) over your lower belly. Never go to sleep with a heating pad in place. Put a thin cloth between the heating pad and your skin. To help prevent kidney infections  · Drink plenty of water each day. This helps you urinate often, which clears bacteria from your system.  If you have kidney, heart, or liver disease and have to limit fluids, talk with your doctor before you increase the amount of fluids you drink. · Urinate when you have the urge. Do not hold your urine for a long time. Urinate before you go to sleep. · If you have symptoms of a bladder infection, such as burning when you urinate or having to urinate often, call your doctor so you can treat the problem before it gets worse. If you do not treat a bladder infection quickly, it can spread to the kidney. · Men should keep the tip of the penis clean. If you are a woman, keep these ideas in mind:  · Urinate right after you have sex. · Change sanitary pads often. Avoid douches, feminine hygiene sprays, and other feminine hygiene products that have deodorants. · After going to the bathroom, wipe from front to back. When should you call for help? Call your doctor now or seek immediate medical care if:  · You have symptoms that a kidney infection is getting worse. These may include:  ? Pain or burning when you urinate. ? A frequent need to urinate without being able to pass much urine. ? Pain in the flank, which is just below the rib cage and above the waist on either side of the back. ? Blood in the urine. ? A fever. · You are vomiting or nauseated. Watch closely for changes in your health, and be sure to contact your doctor if:  · You do not get better as expected. Where can you learn more? Go to https://Comic RocketpeGroundedPower.ZenHub. org and sign in to your KnowNow account. Enter B306 in the [facility] box to learn more about \"Kidney Infection: Care Instructions. \"     If you do not have an account, please click on the \"Sign Up Now\" link. Current as of: August 12, 2019               Content Version: 12.5  © 0064-2330 [employer]ocia. Care instructions adapted under license by [facility] ([facility]).  If you have questions about a medical condition or this instruction, always ask your healthcare professional. Guera Luna any warranty or liability for your use of this information.

## 2020-08-28 LAB
CULTURE: NORMAL
Lab: NORMAL
SPECIMEN DESCRIPTION: NORMAL

## 2020-09-15 ENCOUNTER — OFFICE VISIT (OUTPATIENT)
Dept: FAMILY MEDICINE CLINIC | Age: 50
End: 2020-09-15
Payer: MEDICARE

## 2020-09-15 VITALS
BODY MASS INDEX: 55.49 KG/M2 | RESPIRATION RATE: 16 BRPM | WEIGHT: 293 LBS | DIASTOLIC BLOOD PRESSURE: 92 MMHG | SYSTOLIC BLOOD PRESSURE: 130 MMHG | OXYGEN SATURATION: 99 % | HEART RATE: 86 BPM

## 2020-09-15 PROCEDURE — 99213 OFFICE O/P EST LOW 20 MIN: CPT | Performed by: NURSE PRACTITIONER

## 2020-09-15 PROCEDURE — 90686 IIV4 VACC NO PRSV 0.5 ML IM: CPT | Performed by: NURSE PRACTITIONER

## 2020-09-15 PROCEDURE — 1036F TOBACCO NON-USER: CPT | Performed by: NURSE PRACTITIONER

## 2020-09-15 PROCEDURE — G8427 DOCREV CUR MEDS BY ELIG CLIN: HCPCS | Performed by: NURSE PRACTITIONER

## 2020-09-15 PROCEDURE — G8417 CALC BMI ABV UP PARAM F/U: HCPCS | Performed by: NURSE PRACTITIONER

## 2020-09-15 RX ORDER — PREDNISONE 20 MG/1
40 TABLET ORAL DAILY
Qty: 20 TABLET | Refills: 0 | Status: SHIPPED | OUTPATIENT
Start: 2020-09-15 | End: 2020-09-25

## 2020-09-15 ASSESSMENT — ENCOUNTER SYMPTOMS
ABDOMINAL PAIN: 0
SHORTNESS OF BREATH: 0
COUGH: 0
WHEEZING: 0

## 2020-09-15 NOTE — PROGRESS NOTES
[facility]  [address] [place] [id number]  Dept: [phone]  Dept Fax: [phone]  Loc: [phone]     Visit Date:  9/15/2020    Patient:  Jaskaran Bell  YOB: 1970    HPI:     Chief Complaint   Patient presents with    Knee Pain     Ongoing problem for 3 years. Right knee pain has been worse last 3 weeks. Pain is medial knee. No injury. She rates pain 6-7. HPI   Right knee pain  3Years duration of onset, worsening pain in the last 2 months  Discomfort with walking and discomfort at rest.  Morbidly obese. 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 Does a lot of walking at work and currently her weight is 338#    Did PT eval and tx, 2015 for the left knee and states that the physical therapy did not help. She states she also sees saw orthopedist at that time and they did not recommend surgery. Medications  Prior to Visit Medications    Medication Sig Taking? Authorizing Provider   Multiple Vitamins-Minerals (MULTIVITAMIN ADULTS PO) Take 1 tablet by mouth daily Yes Historical Provider, MD        The patient has No Known Allergies. Past Medical History  Maritza Silva  has a past medical history of Gall bladder disease, Heartburn, and Morbid obesity with BMI of 40.0-44.9, adult ([facility] [address] Past Surgical History  The patient  has a past surgical history that includes Tubal ligation (2012); other surgical history (2013); other surgical history (11/2009); and Cholecystectomy, laparoscopic (8/18/2014). Family History  This patient's family history includes Cancer in her father and paternal grandmother; Diabetes in her mother and paternal grandmother; Heart Disease in her brother, paternal grandfather, and sister; High Blood Pressure in her mother; Other in her mother; Stroke in her sister. Social History  Maritza Silva  reports that she quit smoking about 11 years ago.  Her smoking use included cigarettes. She has never used smokeless tobacco. She reports that she does not drink alcohol or use drugs. Health Maintenance:    Health Maintenance   Topic Date Due    HIV screen  02/07/1987    Diabetes screen  02/07/2012    Cervical cancer screen  06/27/2017    Lipid screen  06/24/2019    DTaP/Tdap/Td vaccine (2 - Td) 01/05/2022    Flu vaccine  Completed    Hepatitis A vaccine  Aged Out    Hepatitis B vaccine  Aged Out    Hib vaccine  Aged Out    Meningococcal (ACWY) vaccine  Aged Out    Pneumococcal 0-64 years Vaccine  Aged Out       Subjective:      Review of Systems   Constitutional: Negative for chills, fatigue and fever. HENT: Negative for congestion. Respiratory: Negative for cough, shortness of breath and wheezing. Cardiovascular: Negative for chest pain, palpitations and leg swelling. Gastrointestinal: Negative for abdominal pain. Musculoskeletal:        Knee, legs are very large, due to body habitus difficult to appreciate an effusion, positive crepitus, tenderness on the medial side of the knee. No redness, no rash, no heat. Gait slow due to habitus   Neurological: Negative for dizziness. Objective:     BP (!) 130/92 (Site: Left Upper Arm, Cuff Size: Large Adult)   Pulse 86   Resp 16   Wt (!) 338 lb 9.6 oz (153.6 kg)   SpO2 99%   BMI 55.49 kg/m²     Physical Exam  Vitals signs and nursing note reviewed. Constitutional:       Appearance: Normal appearance. Cardiovascular:      Rate and Rhythm: Normal rate and regular rhythm. Heart sounds: S1 normal and S2 normal.   Pulmonary:      Effort: Pulmonary effort is normal.      Breath sounds: Normal breath sounds. Abdominal:      General: Bowel sounds are normal.      Palpations: Abdomen is soft. Musculoskeletal:      Right lower leg: No edema. Left lower leg: No edema. Legs:    Skin:     General: Skin is warm. Neurological:      Mental Status: She is alert.    Psychiatric:         Attention and

## 2020-09-28 ENCOUNTER — TELEMEDICINE (OUTPATIENT)
Dept: FAMILY MEDICINE CLINIC | Age: 50
End: 2020-09-28

## 2020-10-02 ENCOUNTER — OFFICE VISIT (OUTPATIENT)
Dept: ORTHOPEDIC SURGERY | Age: 50
End: 2020-10-02
Payer: MEDICARE

## 2020-10-02 VITALS
BODY MASS INDEX: 55.75 KG/M2 | DIASTOLIC BLOOD PRESSURE: 74 MMHG | HEART RATE: 87 BPM | SYSTOLIC BLOOD PRESSURE: 126 MMHG | WEIGHT: 293 LBS | OXYGEN SATURATION: 97 %

## 2020-10-02 PROCEDURE — G8427 DOCREV CUR MEDS BY ELIG CLIN: HCPCS | Performed by: ORTHOPAEDIC SURGERY

## 2020-10-02 PROCEDURE — G8417 CALC BMI ABV UP PARAM F/U: HCPCS | Performed by: ORTHOPAEDIC SURGERY

## 2020-10-02 PROCEDURE — G8482 FLU IMMUNIZE ORDER/ADMIN: HCPCS | Performed by: ORTHOPAEDIC SURGERY

## 2020-10-02 PROCEDURE — 99203 OFFICE O/P NEW LOW 30 MIN: CPT | Performed by: ORTHOPAEDIC SURGERY

## 2020-10-02 PROCEDURE — 99201 HC NEW PT, E/M LEVEL 1: CPT

## 2020-10-02 PROCEDURE — 1036F TOBACCO NON-USER: CPT | Performed by: ORTHOPAEDIC SURGERY

## 2020-10-02 RX ORDER — DICLOFENAC SODIUM 75 MG/1
75 TABLET, DELAYED RELEASE ORAL 2 TIMES DAILY
Qty: 28 TABLET | Refills: 0 | Status: SHIPPED | OUTPATIENT
Start: 2020-10-02 | End: 2020-12-23

## 2020-10-02 NOTE — PROGRESS NOTES
Orthopedic Knee Encounter Note     Chief complaint: Bilateral knee pain    HPI: Isaac Weinstein is a 52 y.o. female who presents for evaluation of both knees. Her right hurts more than the left. It's been ongoing for about 2 months now without precipitating trauma or injury. It started in the right knee medially but now is diffuse. It's constant but certainly worse with weightbearing and her job involves her being on her feet 8 hours a day. She does have some swelling by the end of the day and pain at night time as well. She denies having any mechanical symptoms. As noted above her left knee hurts as well but to a lesser degree. Her pain on this side is mostly medial and anterior and present with weight bearing. Previous treatment:    NSAIDs: Oral steroids for 10 days    Injections:  None    Physical therapy: No    Surgeries: None    Review of Systems:     Constitution: no fever or chills   Pain level: 8/10  Musculoskeletal: As noted in the HPI   Neurologic: no neurologic symptoms    Past Medical History  Vania Obrien  has a past medical history of Gall bladder disease, Heartburn, and Morbid obesity with BMI of 40.0-44.9, adult ([facility] [address] Past Surgical History  Vania Obrien  has a past surgical history that includes Tubal ligation (2012); other surgical history (2013); other surgical history (11/2009); and Cholecystectomy, laparoscopic (8/18/2014). Current Medications  Current Outpatient Medications   Medication Sig Dispense Refill    diclofenac (VOLTAREN) 75 MG EC tablet Take 1 tablet by mouth 2 times daily for 14 days 28 tablet 0    Multiple Vitamins-Minerals (MULTIVITAMIN ADULTS PO) Take 1 tablet by mouth daily       No current facility-administered medications for this visit. Allergies  Allergies have been reviewed. Vania Obrien has No Known Allergies. Social History  Vania Obrien  reports that she quit smoking about 11 years ago. Her smoking use included cigarettes.  She has never used smokeless tobacco. She reports that she does not drink alcohol or use drugs. Family History  Wen's family history includes Cancer in her father and paternal grandmother; Diabetes in her mother and paternal grandmother; Heart Disease in her brother, paternal grandfather, and sister; High Blood Pressure in her mother; Other in her mother; Stroke in her sister. Physical Exam:     /74   Pulse 87   Wt (!) 340 lb 3.2 oz (154.3 kg)   SpO2 97%   BMI 55.75 kg/m²    General Appearance: alert, well appearing, and in no distress  Mental Status: alert, oriented to person, place, and time  Gait: normal  Hips: Good pain-free ROM without crepitation    Knee: Bilateral    Skin: warm and dry, no rash or erythema  Vasculature: 2+ pedal pulses bilaterally  Neuro: Sensation grossly intact to light touch diffusely  Alignment: Normal  Tenderness: Tender to palpation along medial and lateral joint lines of both knees    ROM: (Degrees)    Right   A P   Left   A P    Extension  0    Extension  0   Flexion   75    Flexion   100      Crepitation  Yes    Crepitation  Yes      Muscle strength:    Right       Left    Flexion   5    Flexion   5  Extension  5    Extension  5  SLR   5    SLR   5    Extensor lag   n    Extensor lag  n      Special testing:    Right          Left    y    Pain with deep knee flexion   n  y    Patellar grind     y  n    Patellar apprehension    n  n    Patellar glide     n    n    Lachman     n  n    Anterior drawer    n  n    Pivot shift     n  n    Posterior drawer    n  n    Dial test     n  n    Posterolateral drawer    n  n    Posterior Sag     n  n    MCL      n  n    LCL      n    y    Medial joint line tenderness   y  y    Lateral joint line tenderness   y  y    Appley's     y      Imaging:  Xrays of bilateral knee from 9/30/20 were reviewed demonstrating moderate to severe medial compartment joint space loss associated with tricompartmental joint osteophytes. No obvious fracture or dislocation.      Impression/Plan:     Hazle Late is a 52 y.o. old female with bilateral knee osteoarthritis. I had a discussion with the patient with regards to the nature and extent of her problem. We also discussed treatment options available to her including non-operative and operative intervention. To this end we discussed use of NSAIDs, cortisone and viscosupplementation injections, weight loss, activity modification, physical therapy, bracing, and use of assistive walking devices. I emphasized the need for weight loss. We also had discussions about knee arthroscopies and total knee arthroplasties. As outlined above she has attempted treatment to include use of oral steroids. At this time she would like to proceed with a course of a prescription NSAID and PT. A prescription for voltaren was sent to her pharmacy and she was provided with a script for PT. Should she have persistent or worsening symptoms, she was encouraged to return for re-evaluation and additional treatment.         NA = Not assessed  n = No  y = Yes  SLR = Straight leg raise  MCL = Medial collateral ligament  LCL = Lateral collateral ligament

## 2020-10-02 NOTE — LETTER
10/2/2020    Jarad Sierra, APRN - CNP  [address] [facility], [address] [place] [address]    RE: Daniel Telles    Dear Dr. Chidi Love,    Thank you for allowing me to participate in the care of Ms. Morton. I had the opportunity to evaluate the patient on 10/2/2020. Attached you will find my evaluation and recommendations. Thanks again for the confidence you have expressed in me by allowing my participation in the care of your patient. I will keep you apprised of further developments in the patients treatment course as it progresses. If I can be of further assistance in any fashion, please feel free to contact me at your convenience.     Sincerely,        Alfred Gambino  Shoulder and Elbow Surgery

## 2020-12-23 ENCOUNTER — OFFICE VISIT (OUTPATIENT)
Dept: FAMILY MEDICINE CLINIC | Age: 50
End: 2020-12-23
Payer: MEDICARE

## 2020-12-23 ENCOUNTER — HOSPITAL ENCOUNTER (OUTPATIENT)
Age: 50
Setting detail: SPECIMEN
Discharge: HOME | End: 2020-12-23
Payer: MEDICARE

## 2020-12-23 VITALS
TEMPERATURE: 99.4 F | OXYGEN SATURATION: 96 % | HEART RATE: 92 BPM | DIASTOLIC BLOOD PRESSURE: 84 MMHG | SYSTOLIC BLOOD PRESSURE: 126 MMHG

## 2020-12-23 PROCEDURE — G8427 DOCREV CUR MEDS BY ELIG CLIN: HCPCS | Performed by: NURSE PRACTITIONER

## 2020-12-23 PROCEDURE — 1036F TOBACCO NON-USER: CPT | Performed by: NURSE PRACTITIONER

## 2020-12-23 PROCEDURE — 99214 OFFICE O/P EST MOD 30 MIN: CPT | Performed by: NURSE PRACTITIONER

## 2020-12-23 PROCEDURE — G8417 CALC BMI ABV UP PARAM F/U: HCPCS | Performed by: NURSE PRACTITIONER

## 2020-12-23 PROCEDURE — G8482 FLU IMMUNIZE ORDER/ADMIN: HCPCS | Performed by: NURSE PRACTITIONER

## 2020-12-23 PROCEDURE — 87804 INFLUENZA ASSAY W/OPTIC: CPT | Performed by: NURSE PRACTITIONER

## 2020-12-23 PROCEDURE — U0003 INFECTIOUS AGENT DETECTION BY NUCLEIC ACID (DNA OR RNA); SEVERE ACUTE RESPIRATORY SYNDROME CORONAVIRUS 2 (SARS-COV-2) (CORONAVIRUS DISEASE [COVID-19]), AMPLIFIED PROBE TECHNIQUE, MAKING USE OF HIGH THROUGHPUT TECHNOLOGIES AS DESCRIBED BY CMS-2020-01-R: HCPCS

## 2020-12-23 PROCEDURE — 99211 OFF/OP EST MAY X REQ PHY/QHP: CPT

## 2020-12-23 RX ORDER — ALBUTEROL SULFATE 90 UG/1
2 AEROSOL, METERED RESPIRATORY (INHALATION) EVERY 4 HOURS PRN
Qty: 1 INHALER | Refills: 0 | Status: SHIPPED | OUTPATIENT
Start: 2020-12-23 | End: 2021-09-13

## 2020-12-23 ASSESSMENT — ENCOUNTER SYMPTOMS
NAUSEA: 0
SWOLLEN GLANDS: 0
SORE THROAT: 1
VOMITING: 0
CHANGE IN BOWEL HABIT: 0
COUGH: 1

## 2020-12-23 NOTE — PROGRESS NOTES
[address], APRN-CNP  [address]  Phone:  [phone]  Fax:  [phone]  Kevin Barr is a 52 y.o. female who presents today for her medical conditions/complaints as noted below. Wen MAGAÑA Praveen c/o of Pharyngitis (x1 day-cough-drainage down back of throat-headache started today)      HPI:     Pharyngitis  This is a new problem. The current episode started yesterday. The problem occurs constantly. The problem has been gradually worsening. Associated symptoms include congestion, coughing, fatigue, headaches and a sore throat. Pertinent negatives include no arthralgias, change in bowel habit, chills, fever, nausea, rash, swollen glands or vomiting. Treatments tried: cough drops. The treatment provided no relief.        Wt Readings from Last 3 Encounters:   10/02/20 (!) 340 lb 3.2 oz (154.3 kg)   09/15/20 (!) 338 lb 9.6 oz (153.6 kg)   08/26/20 (!) 334 lb (151.5 kg)       Temp Readings from Last 3 Encounters:   12/23/20 99.4 °F (37.4 °C)   08/26/20 97.6 °F (36.4 °C) (Tympanic)   11/11/19 98.4 °F (36.9 °C) (Tympanic)       BP Readings from Last 3 Encounters:   12/23/20 126/84   10/02/20 126/74   09/15/20 (!) 130/92       Pulse Readings from Last 3 Encounters:   12/23/20 92   10/02/20 87   09/15/20 86              Past Medical History:   Diagnosis Date    Gall bladder disease     Heartburn     Morbid obesity with BMI of 40.0-44.9, adult [facility])       Past Surgical History:   Procedure Laterality Date    CHOLECYSTECTOMY, LAPAROSCOPIC  8/18/2014    with OP GRAMS    OTHER SURGICAL HISTORY  2013    full mouth tooth extraction    OTHER SURGICAL HISTORY  11/2009    perineal suture repair following childbirth    TUBAL LIGATION  2012     Family History   Problem Relation Age of Onset    High Blood Pressure Mother     Other Mother         Glaucoma    Diabetes Mother     Cancer Father         lung    Heart Disease Sister     Stroke Sister  Heart Disease Brother     Diabetes Paternal Grandmother     Cancer Paternal Grandmother         breast    Heart Disease Paternal Grandfather      Social History     Tobacco Use    Smoking status: Former Smoker     Types: Cigarettes     Quit date: 2009     Years since quittin.5    Smokeless tobacco: Never Used   Substance Use Topics    Alcohol use: No      Current Outpatient Medications   Medication Sig Dispense Refill    albuterol sulfate HFA (PROVENTIL HFA) 108 (90 Base) MCG/ACT inhaler Inhale 2 puffs into the lungs every 4 hours as needed for Wheezing or Shortness of Breath (cough) Provide what insurance will cover. 1 Inhaler 0     No current facility-administered medications for this visit. No Known Allergies    No exam data present    Subjective:      Review of Systems   Constitutional: Positive for fatigue. Negative for chills and fever. HENT: Positive for congestion and sore throat. Respiratory: Positive for cough. Gastrointestinal: Negative for change in bowel habit, nausea and vomiting. Musculoskeletal: Negative for arthralgias. Skin: Negative for rash. Neurological: Positive for headaches. Objective:     /84   Pulse 92   Temp 99.4 °F (37.4 °C)   SpO2 96%     Physical Exam  Vitals signs and nursing note reviewed. Constitutional:       General: She is not in acute distress. Appearance: Normal appearance. She is well-developed. She is not ill-appearing, toxic-appearing or diaphoretic. HENT:      Head: Normocephalic. Right Ear: Tympanic membrane, ear canal and external ear normal.      Left Ear: Tympanic membrane, ear canal and external ear normal.      Nose: Congestion and rhinorrhea present. Mouth/Throat:      Mouth: Mucous membranes are moist.      Pharynx: Oropharynx is clear. Posterior oropharyngeal erythema present. Eyes:      General: No scleral icterus. Right eye: No discharge. Left eye: No discharge. Conjunctiva/sclera: Conjunctivae normal.   Neck:      Musculoskeletal: Normal range of motion and neck supple. Cardiovascular:      Rate and Rhythm: Normal rate and regular rhythm. Heart sounds: Normal heart sounds. Pulmonary:      Effort: Pulmonary effort is normal. No respiratory distress. Breath sounds: Normal breath sounds. Musculoskeletal: Normal range of motion. Skin:     General: Skin is warm and dry. Capillary Refill: Capillary refill takes less than 2 seconds. Neurological:      Mental Status: She is alert and oriented to person, place, and time. Psychiatric:         Mood and Affect: Mood normal.         Speech: Speech normal.         Behavior: Behavior normal.         Thought Content: Thought content normal.         Judgment: Judgment normal.         Assessment:      Diagnosis Orders   1. Acute pharyngitis, unspecified etiology  COVID-19 Ambulatory    POCT Influenza A/B   2. Bronchitis  albuterol sulfate HFA (PROVENTIL HFA) 108 (90 Base) MCG/ACT inhaler     No results found for this visit on 12/23/20. Plan:     Albuterol inhaler as directed. Please go to the emergency room if you become short of breath, have weakness or extreme fatigue or if you feel you may be dehydrated. You may call the office if it is during normal business hours and request a nurse visit where we check you pulse oximetry/oxygen level. If your level is too low you will be sent to the hospital for further treatment. You are being provided a work or school excuse so you may quarantine until you test results are back. If you are COVID positive you will be given an additional excuse to lengthen your quarantine. Practice coughing and deep breathing every hour while awake. If you are laying down, change positions frequently. Try laying on your stomach to open up your lungs more. Follow up with primary care provider in 1 to 2 days if needed. COVID note given. Patient Instructions     Please go to the emergency room if you become short of breath, have weakness or extreme fatigue or if you feel you may be dehydrated. You may call the office if it is during normal business hours and request a nurse visit where we check you pulse oximetry/oxygen level. If your level is too low you will be sent to the hospital for further treatment. You are being provided a work or school excuse so you may quarantine until you test results are back. If you are COVID positive you will be given an additional excuse to lengthen your quarantine. Practice coughing and deep breathing every hour while awake. If you are laying down, change positions frequently. Try laying on your stomach to open up your lungs more. Follow up with primary care provider in 1 to 2 days if needed. COVID note given. Patient Education        Sore Throat: Care Instructions  Your Care Instructions     Infection by bacteria or a virus causes most sore throats. Cigarette smoke, dry air, air pollution, allergies, and yelling can also cause a sore throat. Sore throats can be painful and annoying. Fortunately, most sore throats go away on their own. If you have a bacterial infection, your doctor may prescribe antibiotics. Follow-up care is a key part of your treatment and safety. Be sure to make and go to all appointments, and call your doctor if you are having problems. It's also a good idea to know your test results and keep a list of the medicines you take. How can you care for yourself at home? · If your doctor prescribed antibiotics, take them as directed. Do not stop taking them just because you feel better. You need to take the full course of antibiotics. · Gargle with warm salt water once an hour to help reduce swelling and relieve discomfort. Use 1 teaspoon of salt mixed in 1 cup of warm water. Care instructions adapted under license by [facility] ([facility]). If you have questions about a medical condition or this instruction, always ask your healthcare professional. [place] [address] any warranty or liability for your use of this information. Patient Education        10 Things to Do When You Have COVID-19    Stay home. Don't go to school, work, or public areas. And don't use public transportation, ride-shares, or taxis unless you have no choice. Leave your home only if you need to get medical care. But call the doctor's office first so they know you're coming. And wear a cloth face cover. Ask before leaving isolation. Talk with your doctor or other health professional about when it will be safe for you to leave isolation. Wear a cloth face cover when you are around other people. It can help stop the spread of the virus when you cough or sneeze. Limit contact with people in your home. If possible, stay in a separate bedroom and use a separate bathroom. Avoid contact with pets and other animals. If possible, have a friend or family member care for them while you're sick. Cover your mouth and nose with a tissue when you cough or sneeze. Then throw the tissue in the trash right away. Wash your hands often, especially after you cough or sneeze. Use soap and water, and scrub for at least 20 seconds. If soap and water aren't available, use an alcohol-based hand . Don't share personal household items. These include bedding, towels, cups and glasses, and eating utensils. Clean and disinfect your home every day. Use household  or disinfectant wipes or sprays. Take special care to clean things that you grab with your hands. These include doorknobs, remote controls, phones, and handles on your refrigerator and microwave. And don't forget countertops, tabletops, bathrooms, and computer keyboards. · Take acetaminophen (such as Tylenol) to reduce a fever. It may also help with muscle aches. Read and follow all instructions on the label. · Use petroleum jelly on sore skin. This can help if the skin around your nose and lips becomes sore from rubbing a lot with tissues. Tips for self-isolation  · Limit contact with people in your home. If possible, stay in a separate bedroom and use a separate bathroom. · Wear a cloth face cover when you are around other people. It can help stop the spread of the virus when you cough or sneeze. · If you have to leave home, avoid crowds and try to stay at least 6 feet away from other people. · Avoid contact with pets and other animals. · Cover your mouth and nose with a tissue when you cough or sneeze. Then throw it in the trash right away. · Wash your hands often, especially after you cough or sneeze. Use soap and water, and scrub for at least 20 seconds. If soap and water aren't available, use an alcohol-based hand . · Don't share personal household items. These include bedding, towels, cups and glasses, and eating utensils. · [address] in the warmest water allowed for the fabric type, and dry it completely. It's okay to wash other people's laundry with yours. · Clean and disinfect your home every day. Use household  and disinfectant wipes or sprays. Take special care to clean things that you grab with your hands. These include doorknobs, remote controls, phones, and handles on your refrigerator and microwave. And don't forget countertops, tabletops, bathrooms, and computer keyboards. When you can end self-isolation  · If you know or suspect that you have COVID-19, stay in self-isolation until:  ? You haven't had a fever for 3 days, and  ? Your symptoms have improved, and  ? It's been at least 10 days since your symptoms started. · Talk to your doctor about whether you also need testing, especially if you have a weakened immune system. When should you call for help? Call 911 anytime you think you may need emergency care. For example, call if you have life-threatening symptoms, such as:    · You have severe trouble breathing. (You can't talk at all.)     · You have constant chest pain or pressure.     · You are severely dizzy or lightheaded.     · You are confused or can't think clearly.     · Your face and lips have a blue color.     · You pass out (lose consciousness) or are very hard to wake up. Call your doctor now or seek immediate medical care if:    · You have moderate trouble breathing. (You can't speak a full sentence.)     · You are coughing up blood (more than about 1 teaspoon).     · You have signs of low blood pressure. These include feeling lightheaded; being too weak to stand; and having cold, pale, clammy skin. Watch closely for changes in your health, and be sure to contact your doctor if:    · Your symptoms get worse.     · You are not getting better as expected. Call before you go to the doctor's office. Follow their instructions. And wear a cloth face cover. Current as of: July 10, 2020               Content Version: 12.6  © 2704-4349 [employer]. Care instructions adapted under license by [employer]. If you have questions about a medical condition or this instruction, always ask your healthcare professional. [place] [address] any warranty or liability for your use of this information. Patient/Caregiver instructed on use, benefit, and side effects of prescribed medications. All patient/parent/caregiver questions answered. Patient/parent/caregiver voiced understanding. Reviewed health maintenance. Instructed to continue current medications, diet and exercise. Patient agreed with treatment plan. Follow up as directed.            Electronically signed by ADITI Pennington NP on12/23/2020

## 2020-12-23 NOTE — PATIENT INSTRUCTIONS
· Take an over-the-counter pain medicine, such as acetaminophen (Tylenol), ibuprofen (Advil, Motrin), or naproxen (Aleve). Read and follow all instructions on the label. · Be careful when taking over-the-counter cold or flu medicines and Tylenol at the same time. Many of these medicines have acetaminophen, which is Tylenol. Read the labels to make sure that you are not taking more than the recommended dose. Too much acetaminophen (Tylenol) can be harmful. · Drink plenty of fluids. Fluids may help soothe an irritated throat. Hot fluids, such as tea or soup, may help decrease throat pain. · Use over-the-counter throat lozenges to soothe pain. Regular cough drops or hard candy may also help. These should not be given to young children because of the risk of choking. · Do not smoke or allow others to smoke around you. If you need help quitting, talk to your doctor about stop-smoking programs and medicines. These can increase your chances of quitting for good. · Use a vaporizer or humidifier to add moisture to your bedroom. Follow the directions for cleaning the machine. When should you call for help? Call your doctor now or seek immediate medical care if:    · You have new or worse trouble swallowing.     · Your sore throat gets much worse on one side. Watch closely for changes in your health, and be sure to contact your doctor if you do not get better as expected. Where can you learn more? Go to https://[employer]. org and sign in to your [employer] account. Enter Q862 in the [facility] box to learn more about \"Sore Throat: Care Instructions. \"     If you do not have an account, please click on the \"Sign Up Now\" link. Current as of: April 15, 2020               Content Version: 12.6  © 9787-4495 [employer]. Take acetaminophen (Tylenol) to relieve fever and body aches. Read and follow all instructions on the label. Current as of: July 10, 2020               Content Version: 12.6  © 2006-2020 [employer]. Care instructions adapted under license by [facility] ([facility]). If you have questions about a medical condition or this instruction, always ask your healthcare professional. [address] any warranty or liability for your use of this information. Patient Education        Coronavirus (ZJOCE-12): Care Instructions  Overview  The coronavirus disease (COVID-19) is caused by a virus. Symptoms may include a fever, a cough, and shortness of breath. It mainly spreads person-to-person through droplets from coughing and sneezing. The virus also can spread when people are in close contact with someone who is infected. Most people have mild symptoms and can take care of themselves at home. If their symptoms get worse, they may need care in a hospital. Treatment may include medicines to reduce symptoms, plus breathing support such as oxygen therapy or a ventilator. It's important to not spread the virus to others. If you have COVID-19, wear a face cover anytime you are around other people. You need to isolate yourself while you are sick. Leave your home only if you need to get medical care or testing. Follow-up care is a key part of your treatment and safety. Be sure to make and go to all appointments, and call your doctor if you are having problems. It's also a good idea to know your test results and keep a list of the medicines you take. How can you care for yourself at home? · Get extra rest. It can help you feel better. · Drink plenty of fluids. This helps replace fluids lost from fever. Fluids also help ease a scratchy throat. Water, soup, fruit juice, and hot tea with lemon are good choices. · Take acetaminophen (such as Tylenol) to reduce a fever. It may also help with muscle aches. Read and follow all instructions on the label. · Use petroleum jelly on sore skin. This can help if the skin around your nose and lips becomes sore from rubbing a lot with tissues. Tips for self-isolation  · Limit contact with people in your home. If possible, stay in a separate bedroom and use a separate bathroom. · Wear a cloth face cover when you are around other people. It can help stop the spread of the virus when you cough or sneeze. · If you have to leave home, avoid crowds and try to stay at least 6 feet away from other people. · Avoid contact with pets and other animals. · Cover your mouth and nose with a tissue when you cough or sneeze. Then throw it in the trash right away. · Wash your hands often, especially after you cough or sneeze. Use soap and water, and scrub for at least 20 seconds. If soap and water aren't available, use an alcohol-based hand . · Don't share personal household items. These include bedding, towels, cups and glasses, and eating utensils. · [address] in the warmest water allowed for the fabric type, and dry it completely. It's okay to wash other people's laundry with yours. · Clean and disinfect your home every day. Use household  and disinfectant wipes or sprays. Take special care to clean things that you grab with your hands. These include doorknobs, remote controls, phones, and handles on your refrigerator and microwave. And don't forget countertops, tabletops, bathrooms, and computer keyboards. When you can end self-isolation  · If you know or suspect that you have COVID-19, stay in self-isolation until:  ? You haven't had a fever for 3 days, and  ? Your symptoms have improved, and  ? It's been at least 10 days since your symptoms started. · Talk to your doctor about whether you also need testing, especially if you have a weakened immune system. When should you call for help? Call 911 anytime you think you may need emergency care. For example, call if you have life-threatening symptoms, such as:    · You have severe trouble breathing. (You can't talk at all.)     · You have constant chest pain or pressure.     · You are severely dizzy or lightheaded.     · You are confused or can't think clearly.     · Your face and lips have a blue color.     · You pass out (lose consciousness) or are very hard to wake up. Call your doctor now or seek immediate medical care if:    · You have moderate trouble breathing. (You can't speak a full sentence.)     · You are coughing up blood (more than about 1 teaspoon).     · You have signs of low blood pressure. These include feeling lightheaded; being too weak to stand; and having cold, pale, clammy skin. Watch closely for changes in your health, and be sure to contact your doctor if:    · Your symptoms get worse.     · You are not getting better as expected. Call before you go to the doctor's office. Follow their instructions. And wear a cloth face cover. Current as of: July 10, 2020               Content Version: 12.6  © 2006-2020 [employer]. Care instructions adapted under license by [facility] ([facility]). If you have questions about a medical condition or this instruction, always ask your healthcare professional. [address] any warranty or liability for your use of this information.

## 2020-12-25 LAB — SARS-COV-2, NAA: DETECTED

## 2020-12-26 ENCOUNTER — TELEPHONE (OUTPATIENT)
Dept: PRIMARY CARE CLINIC | Age: 50
End: 2020-12-26

## 2021-06-07 ENCOUNTER — HOSPITAL ENCOUNTER (OUTPATIENT)
Dept: GENERAL RADIOLOGY | Age: 51
Discharge: HOME | End: 2021-06-09
Payer: COMMERCIAL

## 2021-06-07 ENCOUNTER — OFFICE VISIT (OUTPATIENT)
Dept: PRIMARY CARE CLINIC | Age: 51
End: 2021-06-07
Payer: COMMERCIAL

## 2021-06-07 VITALS
HEART RATE: 66 BPM | WEIGHT: 293 LBS | SYSTOLIC BLOOD PRESSURE: 132 MMHG | BODY MASS INDEX: 47.09 KG/M2 | HEIGHT: 66 IN | OXYGEN SATURATION: 97 % | DIASTOLIC BLOOD PRESSURE: 88 MMHG | TEMPERATURE: 97.5 F

## 2021-06-07 DIAGNOSIS — M79.621 PAIN IN RIGHT UPPER ARM: ICD-10-CM

## 2021-06-07 DIAGNOSIS — S40.021A CONTUSION OF RIGHT UPPER ARM, INITIAL ENCOUNTER: Primary | ICD-10-CM

## 2021-06-07 PROCEDURE — 99212 OFFICE O/P EST SF 10 MIN: CPT | Performed by: NURSE PRACTITIONER

## 2021-06-07 PROCEDURE — 1036F TOBACCO NON-USER: CPT | Performed by: NURSE PRACTITIONER

## 2021-06-07 PROCEDURE — 73060 X-RAY EXAM OF HUMERUS: CPT

## 2021-06-07 PROCEDURE — G8427 DOCREV CUR MEDS BY ELIG CLIN: HCPCS | Performed by: NURSE PRACTITIONER

## 2021-06-07 PROCEDURE — 99214 OFFICE O/P EST MOD 30 MIN: CPT | Performed by: NURSE PRACTITIONER

## 2021-06-07 PROCEDURE — G8417 CALC BMI ABV UP PARAM F/U: HCPCS | Performed by: NURSE PRACTITIONER

## 2021-06-07 ASSESSMENT — ENCOUNTER SYMPTOMS
COUGH: 0
NAUSEA: 0
VOMITING: 0
RESPIRATORY NEGATIVE: 1
SORE THROAT: 0

## 2021-06-07 NOTE — PROGRESS NOTES
[facility]             [address], [address]                        Telephone [phone]             Fax [phone]     Joanie Altman  1970  AUD:[id number]   Date of visit:  6/7/2021    Subjective:    Joanie Altman is a 54 y.o.  female who presents to [facility] today (6/7/2021) for evaluation of:    Chief Complaint   Patient presents with    Arm Injury     steel plate fell on right arm at work. Onset today @ 10:15Am       Arm Injury  This is a new problem. The current episode started today. The problem occurs constantly. The problem has been unchanged. Pertinent negatives include no chest pain, chills, congestion, coughing, fever, nausea, sore throat or vomiting. Associated symptoms comments: Steel deck plate fell onto right upper arm. Pain right upper arm. Denies hitting head or LOC. Bettey Grad Exacerbated by: ROM right upper arm, palpation right upper arm. She has tried ice for the symptoms. The treatment provided no relief. She has the following problem list:  Patient Active Problem List   Diagnosis    Morbid obesity with body mass index of 45.0-49.9 in adult [facility])    Pain in joint, lower leg    Chondromalacia of patella        Current medications are:  Current Outpatient Medications   Medication Sig Dispense Refill    albuterol sulfate HFA (PROVENTIL HFA) 108 (90 Base) MCG/ACT inhaler Inhale 2 puffs into the lungs every 4 hours as needed for Wheezing or Shortness of Breath (cough) Provide what insurance will cover. 1 Inhaler 0     No current facility-administered medications for this visit. She has No Known Allergies. .    She  reports that she quit smoking about 11 years ago. Her smoking use included cigarettes.  She has never used smokeless tobacco.      Objective:    Vitals:    06/07/21 1207   BP: 132/88   Site: Left Upper Arm   Position: Sitting   Pulse: 66   Temp: 97.5 °F (36.4 °C) TempSrc: Temporal   SpO2: 97%   Weight: (!) 337 lb (152.9 kg)   Height: 5' 6\" (1.676 m)     Body mass index is 54.39 kg/m². Review of Systems   Constitutional: Negative. Negative for chills and fever. HENT: Negative for congestion and sore throat. Respiratory: Negative. Negative for cough. Cardiovascular: Negative. Negative for chest pain. Gastrointestinal: Negative for nausea and vomiting. Musculoskeletal:        Right upper arm pain       Physical Exam  Vitals and nursing note reviewed. Constitutional:       Appearance: She is well-developed. HENT:      Head: Normocephalic. Eyes:      Pupils: Pupils are equal, round, and reactive to light. Cardiovascular:      Rate and Rhythm: Normal rate and regular rhythm. Heart sounds: Normal heart sounds. Pulmonary:      Effort: Pulmonary effort is normal.      Breath sounds: Normal breath sounds and air entry. Musculoskeletal:      Right upper arm: Swelling, tenderness (increased pain with ROM of right upper arm) and bony tenderness present. Cervical back: Normal range of motion and neck supple. Skin:     General: Skin is warm and dry. Neurological:      General: No focal deficit present. Mental Status: She is alert and oriented to person, place, and time. Psychiatric:         Behavior: Behavior normal.         Thought Content: Thought content normal.       Assessment and Plan:    XR HUMERUS RIGHT (MIN 2 VIEWS)    Result Date: 6/7/2021  EXAMINATION: TWO XRAY VIEWS OF THE RIGHT HUMERUS 6/7/2021 12:43 pm COMPARISON: None. HISTORY: ORDERING SYSTEM PROVIDED HISTORY: Pain in right upper arm TECHNOLOGIST PROVIDED HISTORY: Steep deck plate fell onto right upper arm; pain right upper arm Reason for Exam: Steel plate fell on patients right arm today; proximal left humerus pain Acuity: Acute Type of Exam: Initial FINDINGS: No evidence of acute fracture. There is normal alignment. No acute joint abnormality.   No focal osseous lesion. No focal soft tissue abnormality. Negative right humerus. Diagnosis Orders   1. Contusion of right upper arm, initial encounter     2. Pain in right upper arm  XR HUMERUS RIGHT (MIN 2 VIEWS)       Take Tylenol or ibuprofen as needed for pain. Rest and elevate the affected painful area. Apply cold compresses intermittently as needed. As pain recedes, begin normal activities slowly as tolerated. Follow up with PCP if symptoms do not improve or worsen. The use, risks, benefits, and side effects of prescribed or recommended medications were discussed. All questions were answered and the patient/caregiver voiced understanding. No orders of the defined types were placed in this encounter.         Electronically signed by ADITI Caruso CNP on 6/7/21 at 12:23 PM EDT

## 2021-06-07 NOTE — PATIENT INSTRUCTIONS
for pain, take it as prescribed. ? If you are not taking a prescription pain medicine, ask your doctor if you can take an over-the-counter medicine. · Put ice or a cold pack on the area for 10 to 20 minutes at a time to ease pain. Put a thin cloth between the ice and your skin. When should you call for help? Call your doctor now or seek immediate medical care if:    · You have new pain, or your pain gets worse.     · You have new symptoms such as a fever, a rash, or chills. Watch closely for changes in your health, and be sure to contact your doctor if:    · You do not get better as expected. Where can you learn more? Go to https://Bell BiosystemspeHIT Communityeb.[employer] [place] [employer]. org and sign in to your Graphdive account. Enter I893 in the Stockleap box to learn more about \"Musculoskeletal Pain: Care Instructions. \"     If you do not have an account, please click on the \"Sign Up Now\" link. Current as of: August 4, 2020               Content Version: 12.8  © 2006-2021 Healthwise, Incorporated. Care instructions adapted under license by [facility] ([facility]). If you have questions about a medical condition or this instruction, always ask your healthcare professional. [address] any warranty or liability for your use of this information.

## 2021-06-10 ENCOUNTER — TELEPHONE (OUTPATIENT)
Dept: PRIMARY CARE CLINIC | Age: 51
End: 2021-06-10

## 2021-09-13 ENCOUNTER — HOSPITAL ENCOUNTER (OUTPATIENT)
Age: 51
Setting detail: SPECIMEN
Discharge: HOME | End: 2021-09-13
Payer: MEDICARE

## 2021-09-13 ENCOUNTER — OFFICE VISIT (OUTPATIENT)
Dept: PRIMARY CARE CLINIC | Age: 51
End: 2021-09-13
Payer: MEDICARE

## 2021-09-13 VITALS
TEMPERATURE: 98.3 F | OXYGEN SATURATION: 97 % | DIASTOLIC BLOOD PRESSURE: 74 MMHG | SYSTOLIC BLOOD PRESSURE: 120 MMHG | WEIGHT: 293 LBS | HEART RATE: 74 BPM | BODY MASS INDEX: 54.72 KG/M2

## 2021-09-13 DIAGNOSIS — R09.89 CHEST CONGESTION: ICD-10-CM

## 2021-09-13 DIAGNOSIS — R53.83 FATIGUE, UNSPECIFIED TYPE: ICD-10-CM

## 2021-09-13 DIAGNOSIS — Z20.822 PERSON UNDER INVESTIGATION FOR COVID-19: ICD-10-CM

## 2021-09-13 DIAGNOSIS — J02.9 SORE THROAT: ICD-10-CM

## 2021-09-13 DIAGNOSIS — R51.9 ACUTE NONINTRACTABLE HEADACHE, UNSPECIFIED HEADACHE TYPE: ICD-10-CM

## 2021-09-13 DIAGNOSIS — R05.9 COUGH: ICD-10-CM

## 2021-09-13 DIAGNOSIS — J02.9 SORE THROAT: Primary | ICD-10-CM

## 2021-09-13 LAB — S PYO AG THROAT QL: NORMAL

## 2021-09-13 PROCEDURE — 99213 OFFICE O/P EST LOW 20 MIN: CPT | Performed by: NURSE PRACTITIONER

## 2021-09-13 PROCEDURE — 1036F TOBACCO NON-USER: CPT | Performed by: NURSE PRACTITIONER

## 2021-09-13 PROCEDURE — G8427 DOCREV CUR MEDS BY ELIG CLIN: HCPCS | Performed by: NURSE PRACTITIONER

## 2021-09-13 PROCEDURE — 99212 OFFICE O/P EST SF 10 MIN: CPT | Performed by: NURSE PRACTITIONER

## 2021-09-13 PROCEDURE — G8417 CALC BMI ABV UP PARAM F/U: HCPCS | Performed by: NURSE PRACTITIONER

## 2021-09-13 PROCEDURE — 87880 STREP A ASSAY W/OPTIC: CPT | Performed by: NURSE PRACTITIONER

## 2021-09-13 PROCEDURE — U0005 INFEC AGEN DETEC AMPLI PROBE: HCPCS

## 2021-09-13 PROCEDURE — U0003 INFECTIOUS AGENT DETECTION BY NUCLEIC ACID (DNA OR RNA); SEVERE ACUTE RESPIRATORY SYNDROME CORONAVIRUS 2 (SARS-COV-2) (CORONAVIRUS DISEASE [COVID-19]), AMPLIFIED PROBE TECHNIQUE, MAKING USE OF HIGH THROUGHPUT TECHNOLOGIES AS DESCRIBED BY CMS-2020-01-R: HCPCS

## 2021-09-13 ASSESSMENT — PATIENT HEALTH QUESTIONNAIRE - PHQ9
SUM OF ALL RESPONSES TO PHQ9 QUESTIONS 1 & 2: 0
SUM OF ALL RESPONSES TO PHQ QUESTIONS 1-9: 0
1. LITTLE INTEREST OR PLEASURE IN DOING THINGS: 0
2. FEELING DOWN, DEPRESSED OR HOPELESS: 0

## 2021-09-13 NOTE — PROGRESS NOTES
[facility]  [facility]  [address] [facility] [address] [facility] [address], [facility]  Phone: [phone]   Phone: [phone]  Fax: [phone]   Fax: [phone]      Date: 9/13/2021   Patient:  Lloyd Solis   YOB: 1970 Age: 54 y.o. MRN: [id number]   PCP: ADITI Rose - ZUHAIR       Subjective:    Chief Complaint   Patient presents with    URI       HPI: Patient presents with complaints of frontal headaches, sore throat, dry cough, fatigue, chest congestion, and back aches for the past 2 days. They have tried mucinex and ibuprofen without relief. No known recent ill /sick contacts. They deny acute: fever, chills, shortness of breath (mild), sinus pressure, body aches, loss of sense of taste, loss of sense of smell, otalgia, eye irritation/drainage, mouth/lip sores or irritation, swelling of hands or feet, nausea, vomiting, diarrhea, abdominal pain, or new rash. They have not been tested for Covid-19 in the past 2 weeks. Previous covid-19 vaccines received (per patient/caregiver report): none. : Does not apply. All other review of systems negative. History is obtained from the patient and previous medical records, including any pertinent recent lab/imaging results in EMR and/or Care Everywhere (external results), encounter notes available in EMR, and encounter notes available in Care Everywhere (external notes). Significant family, medical, surgical, and social history reviewed.        Patient Active Problem List   Diagnosis    Morbid obesity with body mass index of 45.0-49.9 in adult [facility])    Pain in joint, lower leg    Chondromalacia of patella       Past Medical History:   Diagnosis Date    Gall bladder disease     Heartburn     Morbid obesity with BMI of 40.0-44.9, adult ([facility] [address]          Objective:    Physical Exam:  Vitals: /74 (Site: Left Upper Arm, Position: Sitting, Cuff Size: Large Adult) Pulse 74   Temp 98.3 °F (36.8 °C) (Tympanic)   Wt (!) 339 lb (153.8 kg)   LMP 09/06/2021   SpO2 97%   BMI 54.72 kg/m²     LABS:  CBC:  No results for input(s): WBC, HGB, PLT in the last 72 hours. BMP:  No results for input(s): NA, K, CL, CO2, BUN, CREATININE, GLUCOSE in the last 72 hours. Hepatic:  No results for input(s): AST, ALT, ALB, BILITOT, ALKPHOS in the last 72 hours. Pertinent lab and radiology results reviewed. General Appearance: well developed, well nourished, and in no acute distress  Skin: warm and dry, no rash, no erythema  Head: normocephalic and atraumatic  Eyes: sclerae white, conjunctivae normal  ENT: left external ear normal, right external ear normal, bilat TM wnl  nose without deformity, nasal mucosa and turbinates minimally congested, sinuses non-tender  Pharynx with mild erythema and edema bilaterally no exudate or petechiae, no cervical lymphadenopathy  Pulmonary/Chest: clear to auscultation bilaterally -- no wheezes, rales or rhonchi, normal air movement, no respiratory distress  Cardiovascular: normal rate, regular rhythm  Abdomen: obese, soft, non-tender, non-distended, normal bowel sounds  Extremities: no cyanosis, no clubbing  Neurologic: no acute gross cranial nerve deficit, gait normal, and speech normal  Psychologic: alert, appropriate mood and affect for situation      Assessment and Plan:     Diagnosis Orders   1. Sore throat  POCT rapid strep A    COVID-19   2. Cough  COVID-19   3. Chest congestion  COVID-19   4. Acute nonintractable headache, unspecified headache type  COVID-19   5. Fatigue, unspecified type  COVID-19   6. Person under investigation for COVID-19       No orders of the defined types were placed in this encounter. Patient education provided. Typical illness duration and progression reviewed. Treatment options discussed. POCT strep negative. She is agreeable to covid testing today. Pharyngeal swab obtained today to test for COVID-19.  Patient

## 2021-09-13 NOTE — PATIENT INSTRUCTIONS
Patient Education        Sore Throat: Care Instructions  Your Care Instructions     Infection by bacteria or a virus causes most sore throats. Cigarette smoke, dry air, air pollution, allergies, and yelling can also cause a sore throat. Sore throats can be painful and annoying. Fortunately, most sore throats go away on their own. If you have a bacterial infection, your doctor may prescribe antibiotics. Follow-up care is a key part of your treatment and safety. Be sure to make and go to all appointments, and call your doctor if you are having problems. It's also a good idea to know your test results and keep a list of the medicines you take. How can you care for yourself at home? · If your doctor prescribed antibiotics, take them as directed. Do not stop taking them just because you feel better. You need to take the full course of antibiotics. · Gargle with warm salt water once an hour to help reduce swelling and relieve discomfort. Use 1 teaspoon of salt mixed in 1 cup of warm water. · Take an over-the-counter pain medicine, such as acetaminophen (Tylenol), ibuprofen (Advil, Motrin), or naproxen (Aleve). Read and follow all instructions on the label. · Be careful when taking over-the-counter cold or flu medicines and Tylenol at the same time. Many of these medicines have acetaminophen, which is Tylenol. Read the labels to make sure that you are not taking more than the recommended dose. Too much acetaminophen (Tylenol) can be harmful. · Drink plenty of fluids. Fluids may help soothe an irritated throat. Hot fluids, such as tea or soup, may help decrease throat pain. · Use over-the-counter throat lozenges to soothe pain. Regular cough drops or hard candy may also help. These should not be given to young children because of the risk of choking. · Do not smoke or allow others to smoke around you. If you need help quitting, talk to your doctor about stop-smoking programs and medicines.  These can increase your chances of quitting for good. · Use a vaporizer or humidifier to add moisture to your bedroom. Follow the directions for cleaning the machine. When should you call for help? Call your doctor now or seek immediate medical care if:    · You have new or worse trouble swallowing.     · Your sore throat gets much worse on one side. Watch closely for changes in your health, and be sure to contact your doctor if you do not get better as expected. Where can you learn more? Go to https://DeNA.Tiltap. org and sign in to your [employer] account. Enter D216 in the NeuroSave box to learn more about \"Sore Throat: Care Instructions. \"     If you do not have an account, please click on the \"Sign Up Now\" link. Current as of: December 2, 2020               Content Version: 12.9  © 5453-1489 [employer]. Care instructions adapted under license by [facility] ([facility]). If you have questions about a medical condition or this instruction, always ask your healthcare professional. [place] [address] any warranty or liability for your use of this information. Patient Education        Coronavirus (CRPTZ-24): Care Instructions  Overview  The coronavirus disease (COVID-19) is caused by a virus. Symptoms may include a fever, a cough, and shortness of breath. It mainly spreads person-to-person through droplets from coughing and sneezing. The virus also can spread when people are in close contact with someone who is infected. Most people have mild symptoms and can take care of themselves at home. If their symptoms get worse, they may need care in a hospital. Treatment may include medicines to reduce symptoms, plus breathing support such as oxygen therapy or a ventilator. It's important to not spread the virus to others. If you have COVID-19, wear a face cover anytime you are around other people. It can help stop the spread of the virus.  You need to isolate yourself while you are household  and disinfectant wipes or sprays. Take special care to clean things that you grab with your hands. These include doorknobs, remote controls, phones, and handles on your refrigerator and microwave. And don't forget countertops, tabletops, bathrooms, and computer keyboards. When you can end self-isolation  · If you know or suspect that you have COVID-19, stay in self-isolation until:  ? You haven't had a fever for 24 hours while not taking medicines to lower the fever, and  ? Your symptoms have improved, and  ? It's been at least 10 days since your symptoms started. · Talk to your doctor about whether you also need testing, especially if you have a weakened immune system. When should you call for help? Call 911 anytime you think you may need emergency care. For example, call if you have life-threatening symptoms, such as:    · You have severe trouble breathing. (You can't talk at all.)     · You have constant chest pain or pressure.     · You are severely dizzy or lightheaded.     · You are confused or can't think clearly.     · Your face and lips have a blue color.     · You pass out (lose consciousness) or are very hard to wake up. Call your doctor now or seek immediate medical care if:    · You have moderate trouble breathing. (You can't speak a full sentence.)     · You are coughing up blood (more than about 1 teaspoon).     · You have signs of low blood pressure. These include feeling lightheaded; being too weak to stand; and having cold, pale, clammy skin. Watch closely for changes in your health, and be sure to contact your doctor if:    · Your symptoms get worse.     · You are not getting better as expected. Call before you go to the doctor's office. Follow their instructions. And wear a cloth face cover. Current as of: March 26, 2021               Content Version: 12.9  © 2006-2021 Healthwise, Incorporated. Care instructions adapted under license by [facility] ([facility]).  If you have keyboards. When should you call for help? Call 911 anytime you think you may need emergency care. For example, call if you have life-threatening symptoms, such as:    · You have severe trouble breathing. (You can't talk at all.)     · You have constant chest pain or pressure.     · You are severely dizzy or lightheaded.     · You are confused or can't think clearly.     · Your face and lips have a blue color.     · You pass out (lose consciousness) or are very hard to wake up. Call your doctor now or seek immediate medical care if:    · You have moderate trouble breathing. (You can't speak a full sentence.)     · You are coughing up blood (more than about 1 teaspoon).     · You have signs of low blood pressure. These include feeling lightheaded; being too weak to stand; and having cold, pale, clammy skin. Watch closely for changes in your health, and be sure to contact your doctor if:    · Your symptoms get worse.     · You are not getting better as expected. Call before you go to the doctor's office. Follow their instructions. And wear a cloth face cover. Current as of: March 26, 2021               Content Version: 12.9  © 2006-2021 [employer], Incorporated. Care instructions adapted under license by [facility] ([facility]). If you have questions about a medical condition or this instruction, always ask your healthcare professional. [place] [address] any warranty or liability for your use of this information.

## 2021-09-14 LAB
SARS-COV-2: NORMAL
SARS-COV-2: NOT DETECTED
SOURCE: NORMAL

## 2021-09-15 ENCOUNTER — VIRTUAL VISIT (OUTPATIENT)
Dept: FAMILY MEDICINE CLINIC | Age: 51
End: 2021-09-15
Payer: MEDICARE

## 2021-09-15 DIAGNOSIS — J02.9 THROAT SORENESS: ICD-10-CM

## 2021-09-15 DIAGNOSIS — R06.02 SHORTNESS OF BREATH: ICD-10-CM

## 2021-09-15 DIAGNOSIS — J40 BRONCHITIS: Primary | ICD-10-CM

## 2021-09-15 DIAGNOSIS — R09.89 CHEST CONGESTION: ICD-10-CM

## 2021-09-15 PROCEDURE — 99211 OFF/OP EST MAY X REQ PHY/QHP: CPT

## 2021-09-15 PROCEDURE — 99442 PR PHYS/QHP TELEPHONE EVALUATION 11-20 MIN: CPT | Performed by: INTERNAL MEDICINE

## 2021-09-15 RX ORDER — BENZONATATE 200 MG/1
200 CAPSULE ORAL 3 TIMES DAILY PRN
Qty: 30 CAPSULE | Refills: 0 | Status: SHIPPED | OUTPATIENT
Start: 2021-09-15 | End: 2021-09-22

## 2021-09-15 RX ORDER — AZITHROMYCIN 250 MG/1
250 TABLET, FILM COATED ORAL SEE ADMIN INSTRUCTIONS
Qty: 6 TABLET | Refills: 0 | Status: SHIPPED | OUTPATIENT
Start: 2021-09-15 | End: 2021-09-20

## 2021-09-15 RX ORDER — PREDNISONE 20 MG/1
40 TABLET ORAL DAILY
Qty: 10 TABLET | Refills: 0 | Status: SHIPPED | OUTPATIENT
Start: 2021-09-15 | End: 2021-09-20

## 2021-09-15 NOTE — PROGRESS NOTES
Rene Rain (:  1970) is a 54 y.o. female,Established patient, here for evaluation of the following chief complaint(s): No chief complaint on file. ASSESSMENT/PLAN:  1. Bronchitis  -     azithromycin (ZITHROMAX) 250 MG tablet; Take 1 tablet by mouth See Admin Instructions for 5 days 500mg on day 1 followed by 250mg on days 2 - 5, Disp-6 tablet, R-0Normal  -     predniSONE (DELTASONE) 20 MG tablet; Take 2 tablets by mouth daily for 5 days, Disp-10 tablet, R-0Normal  -     benzonatate (TESSALON) 200 MG capsule; Take 1 capsule by mouth 3 times daily as needed for Cough, Disp-30 capsule, R-0Normal  2. Shortness of breath  -     azithromycin (ZITHROMAX) 250 MG tablet; Take 1 tablet by mouth See Admin Instructions for 5 days 500mg on day 1 followed by 250mg on days 2 - 5, Disp-6 tablet, R-0Normal  -     predniSONE (DELTASONE) 20 MG tablet; Take 2 tablets by mouth daily for 5 days, Disp-10 tablet, R-0Normal  -     benzonatate (TESSALON) 200 MG capsule; Take 1 capsule by mouth 3 times daily as needed for Cough, Disp-30 capsule, R-0Normal  3. Throat soreness  -     azithromycin (ZITHROMAX) 250 MG tablet; Take 1 tablet by mouth See Admin Instructions for 5 days 500mg on day 1 followed by 250mg on days 2 - 5, Disp-6 tablet, R-0Normal  -     predniSONE (DELTASONE) 20 MG tablet; Take 2 tablets by mouth daily for 5 days, Disp-10 tablet, R-0Normal  -     benzonatate (TESSALON) 200 MG capsule; Take 1 capsule by mouth 3 times daily as needed for Cough, Disp-30 capsule, R-0Normal  4. Chest congestion  -     azithromycin (ZITHROMAX) 250 MG tablet; Take 1 tablet by mouth See Admin Instructions for 5 days 500mg on day 1 followed by 250mg on days 2 - 5, Disp-6 tablet, R-0Normal  -     predniSONE (DELTASONE) 20 MG tablet; Take 2 tablets by mouth daily for 5 days, Disp-10 tablet, R-0Normal  -     benzonatate (TESSALON) 200 MG capsule;  Take 1 capsule by mouth 3 times daily as needed for Cough, Disp-30 capsule, R-0Normal      No follow-ups on file. SUBJECTIVE/OBJECTIVE:  HPI 47-year-old female is calling regarding URI-like symptoms including coughing spells soreness around the back of her throat been sweaty out of breath shortness of breath with minimal exertion she is a non-smoker. Dry and wet cough and its hard to get the mucus out. She feels like there is congestion/mucus in her chest and her breathing is impaired. She has gone to multiple urgent cares and was told that strep and Covid test was negative and what has do with something with viral infection. Review of Systems   Constitutional: Positive for fatigue. Negative for fever and unexpected weight change. HENT: Positive for congestion. Negative for ear pain, postnasal drip, rhinorrhea, sinus pain, sore throat and trouble swallowing. Eyes: Negative for visual disturbance. Respiratory: Positive for cough and shortness of breath. Negative for chest tightness. Cardiovascular: Negative for chest pain and leg swelling. Gastrointestinal: Negative for abdominal pain, blood in stool and diarrhea. Endocrine: Negative for polyuria. Genitourinary: Negative for difficulty urinating and flank pain. Musculoskeletal: Negative for arthralgias, joint swelling and myalgias. Skin: Negative for rash. Allergic/Immunologic: Negative for environmental allergies. Neurological: Negative for weakness, light-headedness, numbness and headaches. Hematological: Negative for adenopathy. Psychiatric/Behavioral: Negative for behavioral problems and suicidal ideas. The patient is not nervous/anxious.         Patient-Reported Vitals 9/22/2020   Patient-Reported Weight 334   Patient-Reported Height 56        Physical Exam        On this date 9/15/2021 I have spent 30 minutes reviewing previous notes, test results and face to face (virtual) with the patient discussing the diagnosis and importance of compliance with the treatment plan as well as documenting on the day of the visit. A:P  Z pack/Tessalon Perles prednisone to see if that would help with her URI-like symptoms continue with use of antihistamines decongestion agents as well vitamin C mucus thinning drink plenty of hydration should help. Jose E Serrano, was evaluated through a synchronous (real-time) audio-video encounter. The patient (or guardian if applicable) is aware that this is a billable service. Verbal consent to proceed has been obtained within the past 12 months. The visit was conducted pursuant to the emergency declaration under the [address] [place] [address], [address] [place] [address] authority and the [employer] General Act. Patient identification was verified, and a caregiver was present when appropriate. The patient was located in a state where the provider was credentialed to provide care. An electronic signature was used to authenticate this note.     --Sujatha Lawson MD

## 2021-09-17 ENCOUNTER — TELEPHONE (OUTPATIENT)
Dept: FAMILY MEDICINE CLINIC | Age: 51
End: 2021-09-17

## 2021-09-17 DIAGNOSIS — J40 BRONCHITIS: ICD-10-CM

## 2021-09-17 DIAGNOSIS — J02.9 THROAT SORENESS: ICD-10-CM

## 2021-09-17 DIAGNOSIS — J02.9 ACUTE PHARYNGITIS, UNSPECIFIED ETIOLOGY: ICD-10-CM

## 2021-09-17 DIAGNOSIS — R06.02 SHORTNESS OF BREATH: Primary | ICD-10-CM

## 2021-09-17 DIAGNOSIS — R09.89 CHEST CONGESTION: ICD-10-CM

## 2021-09-17 RX ORDER — AMOXICILLIN AND CLAVULANATE POTASSIUM 875; 125 MG/1; MG/1
1 TABLET, FILM COATED ORAL 2 TIMES DAILY
Qty: 14 TABLET | Refills: 0 | Status: SHIPPED | OUTPATIENT
Start: 2021-09-17 | End: 2021-09-24

## 2021-09-17 NOTE — TELEPHONE ENCOUNTER
Spoke with pt she [facility]. Pt would also like to know if your return to work date could be extended to Monday instead of tomorrow. Pt states that she dropped off fmla forms yesterday. I did see the forms today but it looks like someone else signed them? I was not aware of any forms until seeing that. Unsure of whose signature it is?

## 2021-09-17 NOTE — TELEPHONE ENCOUNTER
Pt called stating she saw you virtually and was prescribed medication. She states that she started the medication on Wednesday. Pt states that she is getting a lot worse and the cough is terrible. She would like to know if there is anything else that can be sent in?  Pt phone number is [phone]

## 2021-09-20 ENCOUNTER — TELEPHONE (OUTPATIENT)
Dept: FAMILY MEDICINE CLINIC | Age: 51
End: 2021-09-20

## 2021-09-20 ASSESSMENT — ENCOUNTER SYMPTOMS
CHEST TIGHTNESS: 0
ABDOMINAL PAIN: 0
RHINORRHEA: 0
SHORTNESS OF BREATH: 1
DIARRHEA: 0
COUGH: 1
TROUBLE SWALLOWING: 0
BLOOD IN STOOL: 0
SINUS PAIN: 0
SORE THROAT: 0

## 2021-10-18 ENCOUNTER — OFFICE VISIT (OUTPATIENT)
Dept: FAMILY MEDICINE CLINIC | Age: 51
End: 2021-10-18
Payer: MEDICARE

## 2021-10-18 VITALS
OXYGEN SATURATION: 99 % | SYSTOLIC BLOOD PRESSURE: 136 MMHG | HEIGHT: 66 IN | RESPIRATION RATE: 20 BRPM | DIASTOLIC BLOOD PRESSURE: 78 MMHG | WEIGHT: 293 LBS | TEMPERATURE: 97.4 F | HEART RATE: 76 BPM | BODY MASS INDEX: 47.09 KG/M2

## 2021-10-18 DIAGNOSIS — Z23 NEED FOR INFLUENZA VACCINATION: ICD-10-CM

## 2021-10-18 DIAGNOSIS — M79.672 LEFT FOOT PAIN: Primary | ICD-10-CM

## 2021-10-18 DIAGNOSIS — M25.562 CHRONIC PAIN OF BOTH KNEES: ICD-10-CM

## 2021-10-18 DIAGNOSIS — G89.29 CHRONIC PAIN OF BOTH KNEES: ICD-10-CM

## 2021-10-18 DIAGNOSIS — M25.561 CHRONIC PAIN OF BOTH KNEES: ICD-10-CM

## 2021-10-18 DIAGNOSIS — E66.01 CLASS 3 SEVERE OBESITY DUE TO EXCESS CALORIES WITHOUT SERIOUS COMORBIDITY WITH BODY MASS INDEX (BMI) OF 50.0 TO 59.9 IN ADULT (HCC): ICD-10-CM

## 2021-10-18 PROCEDURE — G8417 CALC BMI ABV UP PARAM F/U: HCPCS | Performed by: NURSE PRACTITIONER

## 2021-10-18 PROCEDURE — G8482 FLU IMMUNIZE ORDER/ADMIN: HCPCS | Performed by: NURSE PRACTITIONER

## 2021-10-18 PROCEDURE — G8427 DOCREV CUR MEDS BY ELIG CLIN: HCPCS | Performed by: NURSE PRACTITIONER

## 2021-10-18 PROCEDURE — 99213 OFFICE O/P EST LOW 20 MIN: CPT | Performed by: NURSE PRACTITIONER

## 2021-10-18 PROCEDURE — 90674 CCIIV4 VAC NO PRSV 0.5 ML IM: CPT | Performed by: NURSE PRACTITIONER

## 2021-10-18 PROCEDURE — 1036F TOBACCO NON-USER: CPT | Performed by: NURSE PRACTITIONER

## 2021-10-18 PROCEDURE — 99212 OFFICE O/P EST SF 10 MIN: CPT

## 2021-10-18 PROCEDURE — PBSHW INFLUENZA, MDCK QUADV, 2 YRS AND OLDER, IM, PF, PREFILL SYR OR SDV, 0.5ML (FLUCELVAX QUADV, PF): Performed by: NURSE PRACTITIONER

## 2021-10-18 RX ORDER — IBUPROFEN 200 MG
600 TABLET ORAL EVERY 6 HOURS PRN
COMMUNITY
End: 2022-05-17

## 2021-10-18 RX ORDER — ACETAMINOPHEN 500 MG
1000 TABLET ORAL EVERY 6 HOURS PRN
COMMUNITY
End: 2022-05-17

## 2021-10-18 SDOH — ECONOMIC STABILITY: FOOD INSECURITY: WITHIN THE PAST 12 MONTHS, THE FOOD YOU BOUGHT JUST DIDN'T LAST AND YOU DIDN'T HAVE MONEY TO GET MORE.: NEVER TRUE

## 2021-10-18 SDOH — ECONOMIC STABILITY: FOOD INSECURITY: WITHIN THE PAST 12 MONTHS, YOU WORRIED THAT YOUR FOOD WOULD RUN OUT BEFORE YOU GOT MONEY TO BUY MORE.: NEVER TRUE

## 2021-10-18 ASSESSMENT — SOCIAL DETERMINANTS OF HEALTH (SDOH): HOW HARD IS IT FOR YOU TO PAY FOR THE VERY BASICS LIKE FOOD, HOUSING, MEDICAL CARE, AND HEATING?: NOT HARD AT ALL

## 2021-10-18 NOTE — PROGRESS NOTES
Nazanin España (:  1970) is a 54 y.o. female,Established patient, here for evaluation of the following chief complaint(s): Other (Here for left foot pain that began around mid summer. Patient reports intense pain on top of foot as well as under the arch of her foot. Patient tried to buy new shoes, orthotics, using ice, taking ibuprofen. None really helped. Pt. will take a flu vaccine today.)         ASSESSMENT/PLAN:  1. Left foot pain  -     [facility], Felisa Dobbs DPM, Podiatry, [place]  - Use ice and NSAIDs, new foot ware with square toe box is reccomended   2. Need for influenza vaccination  -     INFLUENZA, MDCK QUADV, 2 YRS AND OLDER, IM, PF, PREFILL SYR OR SDV, 0.5ML (FLUCELVAX QUADV, PF)  3. Chronic pain of both knees  - Patient is encouraged to follow up with ortho  - PT encouraged  4. Class 3 severe obesity due to excess calories without serious comorbidity with body mass index (BMI) of 50.0 to 59.9 in [facility])      Return if symptoms worsen or fail to improve. Subjective   SUBJECTIVE/OBJECTIVE:  Foot Pain   The pain is present in the left foot and left toes. This is a new problem. The current episode started more than 1 month ago. There has been no history of extremity trauma. The problem occurs daily. The problem has been gradually worsening. The quality of the pain is described as aching. Associated symptoms include joint locking, a limited range of motion and stiffness. Pertinent negatives include no fever, inability to bear weight, itching, joint swelling, numbness or tingling. The symptoms are aggravated by activity and standing. She has tried NSAIDS and cold for the symptoms. The treatment provided mild relief. Her past medical history is significant for osteoarthritis. Patient is on her feet most of the day. She has tried different foot ware and show inserts. Feels like her sock is bunched up under the ball of her foot and arch.      Patient has untreated bilateral knee osteoarthritis. Review of Systems   Constitutional: Negative for fever. Musculoskeletal: Positive for arthralgias and stiffness. Bilateral knee pain. Patient saw ortho last year and tried Mobic which did not help. Patient did not do PT. She is wanting a knee replacement. Skin: Negative for itching and rash. Neurological: Negative for tingling and numbness. Objective   Physical Exam  Vitals and nursing note reviewed. Constitutional:       Appearance: Normal appearance. She is obese. HENT:      Head: Normocephalic and atraumatic. Eyes:      Conjunctiva/sclera: Conjunctivae normal.   Cardiovascular:      Rate and Rhythm: Normal rate. Pulmonary:      Effort: Pulmonary effort is normal.   Musculoskeletal:      Left foot: Deformity present. No foot drop. Feet:       Comments: Antalgic gait noted   Feet:      Right foot:      Skin integrity: Skin integrity normal.      Toenail Condition: Right toenails are normal.      Left foot:      Skin integrity: Skin integrity normal.      Toenail Condition: Left toenails are normal.   Skin:     General: Skin is warm and dry. Neurological:      General: No focal deficit present. Mental Status: She is alert and oriented to person, place, and time. Mental status is at baseline. Psychiatric:         Mood and Affect: Mood normal.         Behavior: Behavior normal.         Thought Content: Thought content normal.         Judgment: Judgment normal.            On this date 10/18/2021 I have spent 20 minutes reviewing previous notes, test results and face to face with the patient discussing the diagnosis and importance of compliance with the treatment plan as well as documenting on the day of the visit. An electronic signature was used to authenticate this note.     --ADITI Burleson - CNP

## 2021-10-18 NOTE — PATIENT INSTRUCTIONS
Patient Education        Calle's Neuroma: Care Instructions  Your Care Instructions  When your toes are squeezed together, often over a period of months or even years, the nerve that runs between the toes can swell and get thicker. This is called a Calle's neuroma. It may feel like a small lump is pushing inside the ball of your foot. When you walk or move your toes, you feel pain that sometimes moves into your toes. If the pressure continues, it may damage the nerve. If you catch the problem early and change your shoes, the nerve swelling may go away. Your doctor may advise you to wear wide-toed shoes. He or she also may suggest that you ice the sore spot and limit activities that put pressure on the nerve. If these steps do not help your symptoms, your doctor may have you use special pads or devices that spread the toes. This keeps them from squeezing the nerve. In some cases, you may get a cortisone shot to reduce swelling and pain. If these treatments don't help, your doctor may suggest surgery to relieve pressure or remove the swollen nerve. Follow-up care is a key part of your treatment and safety. Be sure to make and go to all appointments, and call your doctor if you are having problems. It's also a good idea to know your test results and keep a list of the medicines you take. How can you care for yourself at home? · Ask your doctor if you can take an over-the-counter pain medicine, such as acetaminophen (Tylenol), ibuprofen (Advil, Motrin), or naproxen (Aleve). Be safe with medicines. Read and follow all instructions on the label. · Try to stay off your feet as much as possible until the pain and swelling go away. · Avoid wearing tight, pointy, or high-heeled shoes. Instead, wear roomy footwear. · Put ice or a cold pack on the area for 10 to 20 minutes at a time. Put a thin cloth between the ice and your skin. · Try massaging your feet. This relaxes the muscles around the nerve.   · If your doctor prescribed special pads or a device to relieve pressure on your toes, use these items as directed. · Until all pain and swelling go away, avoid activities that put pressure on the toes. These include racquet sports and running. When should you call for help? Watch closely for changes in your health, and be sure to contact your doctor if:    · You do not get better as expected. Where can you learn more? Go to [email or web address] and sign in to your [email or web address] account. Enter E100 in the [employer] box to learn more about \"Calle's Neuroma: Care Instructions. \"     If you do not have an account, please click on the \"Sign Up Now\" link. Current as of: July 1, 2021               Content Version: 13.0  © 9306-8887 Healthwise, Inc[employer]. Care instructions adapted under license by [facility] ([facility]). If you have questions about a medical condition or this instruction, always ask your healthcare professional. [address] any warranty or liability for your use of this information.

## 2021-10-27 ENCOUNTER — OFFICE VISIT (OUTPATIENT)
Dept: PODIATRY | Age: 51
End: 2021-10-27
Payer: MEDICARE

## 2021-10-27 VITALS
WEIGHT: 293 LBS | HEART RATE: 74 BPM | DIASTOLIC BLOOD PRESSURE: 80 MMHG | SYSTOLIC BLOOD PRESSURE: 132 MMHG | BODY MASS INDEX: 47.09 KG/M2 | HEIGHT: 66 IN

## 2021-10-27 DIAGNOSIS — M86.9 INFLAMMATION OF BONE (HCC): Primary | ICD-10-CM

## 2021-10-27 DIAGNOSIS — M72.2 PLANTAR FASCIITIS, LEFT: ICD-10-CM

## 2021-10-27 DIAGNOSIS — M79.672 FOOT PAIN, LEFT: ICD-10-CM

## 2021-10-27 PROCEDURE — 99204 OFFICE O/P NEW MOD 45 MIN: CPT | Performed by: PODIATRIST

## 2021-10-27 PROCEDURE — 1036F TOBACCO NON-USER: CPT | Performed by: PODIATRIST

## 2021-10-27 PROCEDURE — 99211 OFF/OP EST MAY X REQ PHY/QHP: CPT | Performed by: PODIATRIST

## 2021-10-27 PROCEDURE — G8482 FLU IMMUNIZE ORDER/ADMIN: HCPCS | Performed by: PODIATRIST

## 2021-10-27 PROCEDURE — G8417 CALC BMI ABV UP PARAM F/U: HCPCS | Performed by: PODIATRIST

## 2021-10-27 PROCEDURE — G8427 DOCREV CUR MEDS BY ELIG CLIN: HCPCS | Performed by: PODIATRIST

## 2021-10-27 RX ORDER — METHYLPREDNISOLONE 4 MG/1
TABLET ORAL
Qty: 1 KIT | Refills: 0 | Status: SHIPPED | OUTPATIENT
Start: 2021-10-27 | End: 2021-11-05

## 2021-10-27 NOTE — PROGRESS NOTES
Subjective:  Brigette Dean is a 54 y.o. female who presents to the office today complaining of L foot pian. Symptoms began 2 month(s) ago. Patient relates pain is Present. Pain is rated 10 out of 10 and is described as severe. Treatments prior to today's visit include: Ibuprofen and multiple over-the-counter insoles. .  Patient thinks pain got worse when she got a new job which is much more active on her foot on a day-to-day basis. Currently denies F/C/N/V. No Known Allergies    Past Medical History:   Diagnosis Date    Gall bladder disease     Heartburn     Morbid obesity with BMI of 40.0-44.9, adult ([facility] [address]        Prior to Admission medications    Medication Sig Start Date End Date Taking? Authorizing Provider   methylPREDNISolone (MEDROL DOSEPACK) 4 MG tablet Take by mouth.  10/27/21  Yes Mary Medrano DPM   ibuprofen (ADVIL;MOTRIN) 200 MG tablet Take 600 mg by mouth every 6 hours as needed for Pain   Yes Historical Provider, MD   acetaminophen (TYLENOL) 500 MG tablet Take 1,000 mg by mouth every 6 hours as needed for Pain   Yes Historical Provider, MD       Past Surgical History:   Procedure Laterality Date    CHOLECYSTECTOMY, LAPAROSCOPIC  2014    with OP GRAMS    OTHER SURGICAL HISTORY  2013    full mouth tooth extraction    OTHER SURGICAL HISTORY  2009    perineal suture repair following childbirth    TUBAL LIGATION  2012       Family History   Problem Relation Age of Onset    High Blood Pressure Mother     Other Mother         Glaucoma    Diabetes Mother     Cancer Father         lung    Heart Disease Sister     Stroke Sister     Heart Disease Brother     Diabetes Paternal Grandmother     Cancer Paternal Grandmother         breast    Heart Disease Paternal Grandfather        Social History     Tobacco Use    Smoking status: Former Smoker     Types: Cigarettes     Quit date: 2009     Years since quittin.3    Smokeless tobacco: Never Used   Substance Use Topics    against a wall or counter with one foot back and heel flat,    Leaning into a step, or using a towel or belt. Be active and aggressive with your stretching for maximum benefit. 2.  Ice - 3 times per day. Use a frozen water bottle and roll your foot over it. Helps to  stretch and massage the area at the same time. 3.  Anti-inflammatory - take OTC or Rx as ordered. 4.  Arch support and supportive shoe gear. Wear orthotics or prefabricated arch supports at ALL times in supportive  shoegear. Do NOT go barefoot. Orders Placed This Encounter   Procedures    XR FOOT LEFT (MIN 3 VIEWS)     Standing Status:   Future     Standing Expiration Date:   10/28/2022     Scheduling Instructions:      WB   Further recommendations post x-rays  Orders Placed This Encounter   Medications    methylPREDNISolone (MEDROL DOSEPACK) 4 MG tablet     Sig: Take by mouth. Dispense:  1 kit     Refill:  0   If pain persist  will look into custom orthotics. Patient is moderate level medical decision making. Patient undiagnosed new problem with uncertain prognosis. Will look into further testing for definitive diagnosis. New prescription drug management. Moderate risk morbidity overall at this time. Contact office with any questions/problems/concerns. RTC in 2week(s).

## 2021-11-05 ENCOUNTER — OFFICE VISIT (OUTPATIENT)
Dept: PODIATRY | Age: 51
End: 2021-11-05
Payer: MEDICARE

## 2021-11-05 VITALS
HEIGHT: 66 IN | DIASTOLIC BLOOD PRESSURE: 74 MMHG | SYSTOLIC BLOOD PRESSURE: 132 MMHG | HEART RATE: 84 BPM | BODY MASS INDEX: 47.09 KG/M2 | WEIGHT: 293 LBS

## 2021-11-05 DIAGNOSIS — M79.672 FOOT PAIN, LEFT: ICD-10-CM

## 2021-11-05 DIAGNOSIS — M19.072 ARTHROSIS OF LEFT MIDFOOT: ICD-10-CM

## 2021-11-05 DIAGNOSIS — M72.2 PLANTAR FASCIITIS, LEFT: Primary | ICD-10-CM

## 2021-11-05 PROCEDURE — G8482 FLU IMMUNIZE ORDER/ADMIN: HCPCS | Performed by: PODIATRIST

## 2021-11-05 PROCEDURE — 99213 OFFICE O/P EST LOW 20 MIN: CPT | Performed by: PODIATRIST

## 2021-11-05 PROCEDURE — G8427 DOCREV CUR MEDS BY ELIG CLIN: HCPCS | Performed by: PODIATRIST

## 2021-11-05 PROCEDURE — G8417 CALC BMI ABV UP PARAM F/U: HCPCS | Performed by: PODIATRIST

## 2021-11-05 PROCEDURE — 1036F TOBACCO NON-USER: CPT | Performed by: PODIATRIST

## 2021-11-05 NOTE — PROGRESS NOTES
Subjective:  Nazanin España is a 54 y.o. female who presents to the office today complaining of L foot pian. Symptoms improved with prescription meds. Patient relates pain is Present. Pain is rated 5 out of 10 and is described as moderate. Patient has been compliant with conservative care. Patient interested in x-ray results. Currently denies F/C/N/V. No Known Allergies    Past Medical History:   Diagnosis Date    Gall bladder disease     Heartburn     Morbid obesity with BMI of 40.0-44.9, adult ([facility] [address]        Prior to Admission medications    Medication Sig Start Date End Date Taking?  Authorizing Provider   diclofenac (VOLTAREN) 50 MG EC tablet Take 1 tablet by mouth 3 times daily (with meals) 21  Yes Terrence Medrano DPM   ibuprofen (ADVIL;MOTRIN) 200 MG tablet Take 600 mg by mouth every 6 hours as needed for Pain   Yes Historical Provider, MD   acetaminophen (TYLENOL) 500 MG tablet Take 1,000 mg by mouth every 6 hours as needed for Pain   Yes Historical Provider, MD       Past Surgical History:   Procedure Laterality Date    CHOLECYSTECTOMY, LAPAROSCOPIC  2014    with OP GRAMS    OTHER SURGICAL HISTORY  2013    full mouth tooth extraction    OTHER SURGICAL HISTORY  2009    perineal suture repair following childbirth    TUBAL LIGATION  2012       Family History   Problem Relation Age of Onset    High Blood Pressure Mother     Other Mother         Glaucoma    Diabetes Mother     Cancer Father         lung    Heart Disease Sister     Stroke Sister     Heart Disease Brother     Diabetes Paternal Grandmother     Cancer Paternal Grandmother         breast    Heart Disease Paternal Grandfather        Social History     Tobacco Use    Smoking status: Former Smoker     Types: Cigarettes     Quit date: 2009     Years since quittin.3    Smokeless tobacco: Never Used   Substance Use Topics    Alcohol use: No       ROS: All 14 ROS systems reviewed and pertinent positives noted above, all others negative. Lower Extremity Physical Examination:     Vitals:   Vitals:    11/05/21 0818   BP: 132/74   Pulse: 84     General: AAO x 3 in NAD. Vascular: DP and PT pulses palpable 2/4, bilateral.  CFT <3 seconds, bilateral.  Hair growth present to the level of the digits, bilateral.  Edema absent, bilateral.  Varicosities absent, bilateral. Erythema absent, bilateral. Distal Rubor absent bilateral.  Temperature within normal limits bilateral. Hyperpigmentation absent bilateral. No atrophic skin. Neurological: Sensation intact to light touch to level of digits, bilateral.  Protective sensation intact 10/10 sites via 5.07/10g [place]Favio Monofilament, bilateral.  negative Tinel's, bilateral.  negative Valleix sign, bilateral.  Vibratory intact bilateral.  Reflexes Decreased bilateral.  Paresthesias negative. Dysthesias negative. Sharp/dull intact bilateral.    Musculoskeletal: Muscle strength 5/5, bilateral.  Pain present upon palpation to left 234 metatarsals approximately and dorsal midfoot. .  . Normal medial longitudinal arch, bilateral.  Ankle ROM decreased,bilateral.  1st MPJ ROM within normal limits, bilateral.  Dorsally contracted digits present mild. Integument: Warm, dry, supple, bilateral.  Open lesion absent, bilateral.  Interdigital maceration absent to web spaces bilateral.  Nails within normal limits. Fissures absent, bilateral. Hyperkeratotic tissue is absent. X-ray shows moderate plantar heel spur and dorsal midfoot arthritis. Asessment: Patient is a 54 y.o. female with:    Diagnosis Orders   1. Plantar fasciitis, left  MT FOOT LONGITUDINAL ARCH SUPPO    MT FOOT LONGITUDINAL ARCH SUPPO   2. Arthrosis of left midfoot  MT FOOT LONGITUDINAL ARCH SUPPO    MT FOOT LONGITUDINAL ARCH SUPPO   3. Foot pain, left  MT FOOT LONGITUDINAL ARCH SUPPO    MT FOOT LONGITUDINAL ARCH SUPPO       Plan: Patient examined and evaluated.   Current condition and treatment options discussed in detail. Continue conservative care for plantar fasciitis. Orders Placed This Encounter   Procedures    NM FOOT LONGITUDINAL ARCH SUPPO    NM FOOT LONGITUDINAL ARCH SUPPO   X rays reviewed with the pt in detail. All questions answered. Orders Placed This Encounter   Medications    diclofenac (VOLTAREN) 50 MG EC tablet     Sig: Take 1 tablet by mouth 3 times daily (with meals)     Dispense:  60 tablet     Refill:  0   Due to level of pain/deformity, it is in my medical opinion that patient will benefit from and is medically necessary for custom orthotics at this time. Pt [facility] These will be an all sport flexible device with 2 degree rearfoot varus post, full length 1/16 inch top cover. Forefoot posting of 0 deg. low arch fill. Deep heel cup  Modifications will be 4 mm medial heel skive. Patient low level medical decision making. Stable chronic condition. 1 test reviewed. 1 new prescription. Contact office with any questions/problems/concerns. RTC in 2week(s).

## 2021-11-29 PROCEDURE — L3010 FOOT LONGITUDINAL ARCH SUPPO: HCPCS | Performed by: PODIATRIST

## 2021-12-01 ENCOUNTER — OFFICE VISIT (OUTPATIENT)
Dept: PODIATRY | Age: 51
End: 2021-12-01
Payer: MEDICARE

## 2021-12-01 VITALS
RESPIRATION RATE: 20 BRPM | DIASTOLIC BLOOD PRESSURE: 72 MMHG | WEIGHT: 293 LBS | BODY MASS INDEX: 56.3 KG/M2 | HEART RATE: 84 BPM | SYSTOLIC BLOOD PRESSURE: 132 MMHG

## 2021-12-01 DIAGNOSIS — M72.2 PLANTAR FASCIITIS, LEFT: Primary | ICD-10-CM

## 2021-12-01 PROCEDURE — 99211 OFF/OP EST MAY X REQ PHY/QHP: CPT | Performed by: PODIATRIST

## 2021-12-01 PROCEDURE — 99999 PR OFFICE/OUTPT VISIT,PROCEDURE ONLY: CPT | Performed by: PODIATRIST

## 2022-01-17 ENCOUNTER — HOSPITAL ENCOUNTER (OUTPATIENT)
Age: 52
Setting detail: SPECIMEN
Discharge: HOME | End: 2022-01-17
Payer: MEDICARE

## 2022-01-17 ENCOUNTER — OFFICE VISIT (OUTPATIENT)
Dept: FAMILY MEDICINE CLINIC | Age: 52
End: 2022-01-17
Payer: MEDICARE

## 2022-01-17 VITALS
HEART RATE: 90 BPM | SYSTOLIC BLOOD PRESSURE: 126 MMHG | BODY MASS INDEX: 47.09 KG/M2 | RESPIRATION RATE: 14 BRPM | HEIGHT: 66 IN | OXYGEN SATURATION: 96 % | WEIGHT: 293 LBS | TEMPERATURE: 98.7 F | DIASTOLIC BLOOD PRESSURE: 88 MMHG

## 2022-01-17 DIAGNOSIS — J06.9 VIRAL URI: ICD-10-CM

## 2022-01-17 DIAGNOSIS — Z20.822 EXPOSURE TO COVID-19 VIRUS: ICD-10-CM

## 2022-01-17 DIAGNOSIS — J06.9 VIRAL URI: Primary | ICD-10-CM

## 2022-01-17 PROCEDURE — G8417 CALC BMI ABV UP PARAM F/U: HCPCS | Performed by: NURSE PRACTITIONER

## 2022-01-17 PROCEDURE — G8482 FLU IMMUNIZE ORDER/ADMIN: HCPCS | Performed by: NURSE PRACTITIONER

## 2022-01-17 PROCEDURE — U0003 INFECTIOUS AGENT DETECTION BY NUCLEIC ACID (DNA OR RNA); SEVERE ACUTE RESPIRATORY SYNDROME CORONAVIRUS 2 (SARS-COV-2) (CORONAVIRUS DISEASE [COVID-19]), AMPLIFIED PROBE TECHNIQUE, MAKING USE OF HIGH THROUGHPUT TECHNOLOGIES AS DESCRIBED BY CMS-2020-01-R: HCPCS

## 2022-01-17 PROCEDURE — 99212 OFFICE O/P EST SF 10 MIN: CPT

## 2022-01-17 PROCEDURE — U0005 INFEC AGEN DETEC AMPLI PROBE: HCPCS

## 2022-01-17 PROCEDURE — G8427 DOCREV CUR MEDS BY ELIG CLIN: HCPCS | Performed by: NURSE PRACTITIONER

## 2022-01-17 PROCEDURE — 99213 OFFICE O/P EST LOW 20 MIN: CPT | Performed by: NURSE PRACTITIONER

## 2022-01-17 PROCEDURE — 1036F TOBACCO NON-USER: CPT | Performed by: NURSE PRACTITIONER

## 2022-01-17 ASSESSMENT — ENCOUNTER SYMPTOMS
DIARRHEA: 0
COUGH: 1
SORE THROAT: 1
NAUSEA: 0
RHINORRHEA: 1
VOMITING: 0

## 2022-01-17 NOTE — PATIENT INSTRUCTIONS
COVID swab was obtained. COVID work or school note given. Please go to the emergency room if you become short of breath, have weakness or extreme fatigue or if you feel you may be dehydrated. You may call the office if it is during normal business hours and request a nurse visit where we check you pulse oximetry/oxygen level. If your level is too low you will be sent to the hospital for further treatment. You are being provided a work or school excuse so you may quarantine until you test results are back. If you are COVID positive you will be given an additional excuse to lengthen your quarantine. Practice coughing and deep breathing every hour while awake. If you are laying down, change positions frequently. Try laying on your stomach to open up your lungs more. If you are allowed to take tylenol or ibuprofen you may take these to relieve fever, chills or body aches. Follow up with primary care provider in 1 to 2 days if needed. We will contact with the results of your test, or you may view them on [employer]. If you are positive for COVID. You are to remain in quarantine for 5 days from when your symptoms first began. On day 6, if you are fever free you may return to work, or school, or be out of the home provided you are masked at all times. On day 10 no further masking is required unless you choose to wear a mask to protect yourself and others. Patient Education        Learning About Coronavirus [phone])  What is coronavirus (COVID-19)? COVID-19 is a disease caused by a type of coronavirus. This illness was first found in December 2019. It has since spread worldwide. Coronaviruses are a large group of viruses. They cause the common cold. They also cause more serious illnesses like [place] respiratory syndrome (MERS) and severe acute respiratory syndrome (SARS). COVID-19 is caused by a novel coronavirus.  That means it's a new type that has not been seen in people before. What are the symptoms? COVID-19 symptoms may include:  · Fever. · Cough. · Trouble breathing. · Chills or repeated shaking with chills. · Muscle and body aches. · Headache. · Sore throat. · New loss of taste or smell. · Vomiting. · Diarrhea. In severe cases, COVID-19 can cause pneumonia and make it hard to breathe without help from a machine. It can cause death. How is it diagnosed? COVID-19 is diagnosed with a viral test. This may also be called a PCR test or antigen test. It looks for evidence of the virus in your breathing passages or lungs (respiratory system). The test is most often done on a sample from the nose, throat, or lungs. It's sometimes done on a sample of saliva. One way a sample is collected is by putting a long swab into the back of your nose. How is it treated? Mild cases of COVID-19 can be treated at home. Serious cases need treatment in the hospital. Treatment may include medicines to reduce symptoms, plus breathing support such as oxygen therapy or a ventilator. Some people may be placed on their belly to help their oxygen levels. Treatments that may help people who have COVID-19 include:  Antiviral medicines. These medicines treat viral infections. Remdesivir is an example. Immune-based therapy. These medicines help the immune system fight COVID-19. Examples include monoclonal antibodies. Blood thinners. These medicines help prevent blood clots. People with severe illness are at risk for blood clots. How can you protect yourself and others? · Get vaccinated. · Avoid sick people. · Cover your mouth with a tissue when you cough or sneeze. · Wash your hands often, especially after you cough or sneeze. Use soap and water, and scrub for at least 20 seconds. If soap and water aren't available, use an alcohol-based hand . · Avoid touching your mouth, nose, and eyes. Be sure to follow all instructions from the [facility] and your local health authorities. Here are some examples of specific precautions you may need to take. · If you are not fully vaccinated:  ? Wear a mask if you have to go to public areas. ? Avoid crowds and try to stay at least 6 feet away from other people. · If you have been exposed to the virus and are not fully vaccinated:  ? Stay home. Don't go to school, work, or public areas. And don't use public transportation, ride-shares, or taxis unless you have no choice. ? Wear a mask if you have to go to public areas, like the pharmacy. · Even if you're fully vaccinated, there's still a small chance you can get and spread COVID-19. If you live in an area where COVID-19 is spreading quickly, wear a mask if you have to go to indoor public areas. You might also want to wear a mask in crowded outdoor areas if you:  ? Have certain health conditions. ? Live with someone who has a compromised immune system. ? Live with someone who is not fully vaccinated. · If you have been exposed and you are fully vaccinated:  ? Talk to your doctor. You may need a COVID-19 test.  ? Wear a mask in public indoor spaces for 14 days or until you test negative for COVID-19. If you're sick:  · Leave your home only if you need to get medical care. But call the doctor's office first so they know you're coming. And wear a mask. · Wear a mask whenever you're around other people. · Limit contact with pets and people in your home. If possible, stay in a separate bedroom and use a separate bathroom. · Clean and disinfect your home every day. Use household  and disinfectant wipes or sprays. Take special care to clean things that you touch with your hands. How can you self-isolate when you have COVID-19? If you have COVID-19, there are things you can do to help avoid spreading the virus to others. · Limit contact with people in your home. If possible, stay in a separate bedroom and use a separate bathroom. · Wear a mask when you are around other people.   · If you have to leave home, avoid crowds and try to stay at least 6 feet away from other people. · Avoid contact with pets and other animals. · Cover your mouth and nose with a tissue when you cough or sneeze. Then throw it in the trash right away. · Wash your hands often, especially after you cough or sneeze. Use soap and water, and scrub for at least 20 seconds. If soap and water aren't available, use an alcohol-based hand . · Don't share personal household items. These include bedding, towels, cups and glasses, and eating utensils. · [address] in the warmest water allowed for the fabric type, and dry it completely. It's okay to wash other people's laundry with yours. · Clean and disinfect your home. Use household  and disinfectant wipes or sprays. When should you call for help? Call 911 anytime you think you may need emergency care. For example, call if you have life-threatening symptoms, such as:    · You have severe trouble breathing. (You can't talk at all.)     · You have constant chest pain or pressure.     · You are severely dizzy or lightheaded.     · You are confused or can't think clearly.     · You have pale, gray, or blue-colored skin or lips.     · You pass out (lose consciousness) or are very hard to wake up. Call your doctor now or seek immediate medical care if:    · You have moderate trouble breathing. (You can't speak a full sentence.)     · You are coughing up blood (more than about 1 teaspoon).     · You have signs of low blood pressure. These include feeling lightheaded; being too weak to stand; and having cold, pale, clammy skin. Watch closely for changes in your health, and be sure to contact your doctor if:    · Your symptoms get worse.     · You are not getting better as expected.     · You have new or worse symptoms of anxiety, depression, nightmares, or flashbacks. Call before you go to the doctor's office. Follow their instructions. And wear a mask.   Current as of: July 1, 2021               Content Version: 13.1  © 2006-2021 Healthwise, [employer] Care instructions adapted under license by [facility] ([facility]). If you have questions about a medical condition or this instruction, always ask your healthcare professional. [address] any warranty or liability for your use of this information. Patient Education        Coronavirus ([id number]): Care Instructions  Overview  The coronavirus disease (COVID-19) is caused by a virus. Symptoms may include a fever, a cough, and shortness of breath. It can spread through droplets from coughing and sneezing, breathing, and singing. The virus also can spread when people are in close contact with someone who is infected. Most people have mild symptoms and can take care of themselves at home. If their symptoms get worse, they may need care in a hospital. Treatment may include medicines to reduce symptoms, plus breathing support such as oxygen therapy or a ventilator. It's important to not spread the virus to others. If you have COVID-19, wear a mask anytime you are around other people. It can help stop the spread of the virus. You need to isolate yourself while you are sick. Leave your home only if you need to get medical care or testing. Follow-up care is a key part of your treatment and safety. Be sure to make and go to all appointments, and call your doctor if you are having problems. It's also a good idea to know your test results and keep a list of the medicines you take. How can you care for yourself at home? · Get extra rest. It can help you feel better. · Drink plenty of fluids. This helps replace fluids lost from fever. Fluids may also help ease a scratchy throat. · You can take acetaminophen (Tylenol) or ibuprofen (Advil, Motrin) to reduce a fever. It may also help with muscle and body aches. Read and follow all instructions on the label. · Use petroleum jelly on sore skin.  This can help if the skin around your nose and lips becomes sore from rubbing a lot with tissues. If you use oxygen, use a water-based product instead of petroleum jelly. · Keep track of symptoms such as fever and shortness of breath. This can help you know if you need to call your doctor. It can also help you know when it's safe to be around other people. · In some cases, your doctor might suggest that you get a pulse oximeter. How can you self-isolate when you have COVID-19? If you have COVID-19, there are things you can do to help avoid spreading the virus to others. · Limit contact with people in your home. If possible, stay in a separate bedroom and use a separate bathroom. · Wear a mask when you are around other people. · If you have to leave home, avoid crowds and try to stay at least 6 feet away from other people. · Avoid contact with pets and other animals. · Cover your mouth and nose with a tissue when you cough or sneeze. Then throw it in the trash right away. · Wash your hands often, especially after you cough or sneeze. Use soap and water, and scrub for at least 20 seconds. If soap and water aren't available, use an alcohol-based hand . · Don't share personal household items. These include bedding, towels, cups and glasses, and eating utensils. · [address] in the warmest water allowed for the fabric type, and dry it completely. It's okay to wash other people's laundry with yours. · Clean and disinfect your home. Use household  and disinfectant wipes or sprays. When can you end self-isolation for COVID-19? If you know or think that you have the virus, you will need to self-isolate. You can be around others after:  · It's been at least 10 days since your symptoms started and  · You haven't had a fever for 24 hours without taking medicines to lower the fever and  · Your symptoms are improving. If you tested positive but have no symptoms, you can end isolation after 10 days.  But if you start to have symptoms, follow the steps above. Ask your doctor if you need to be tested before you end isolation. This is especially important if you have a weakened immune system. When should you call for help? Call 911 anytime you think you may need emergency care. For example, call if you have life-threatening symptoms, such as:    · You have severe trouble breathing. (You can't talk at all.)     · You have constant chest pain or pressure.     · You are severely dizzy or lightheaded.     · You are confused or can't think clearly.     · You have pale, gray, or blue-colored skin or lips.     · You pass out (lose consciousness) or are very hard to wake up. Call your doctor now or seek immediate medical care if:    · You have moderate trouble breathing. (You can't speak a full sentence.)     · You are coughing up blood (more than about 1 teaspoon).     · You have signs of low blood pressure. These include feeling lightheaded; being too weak to stand; and having cold, pale, clammy skin. Watch closely for changes in your health, and be sure to contact your doctor if:    · Your symptoms get worse.     · You are not getting better as expected.     · You have new or worse symptoms of anxiety, depression, nightmares, or flashbacks. Call before you go to the doctor's office. Follow their instructions. And wear a mask. Current as of: July 1, 2021               Content Version: 13.1  © 2006-2021 [employer]. Care instructions adapted under license by [facility] ([facility]). If you have questions about a medical condition or this instruction, always ask your healthcare professional. [place] [address] any warranty or liability for your use of this information. Patient Education        10 Things to Do When You Have COVID-19      Stay home. Don't go to school, work, or public areas. And don't use public transportation, ride-shares, or taxis unless you have no choice.  Leave your home only if you need to get medical care. But call the doctor's office first so they know you're coming. And wear a mask. Ask before leaving isolation. Follow your doctor's advice about when it is safe for you to leave isolation. Wear a mask when you are around other people. It can help stop the spread of the virus. Limit contact with people in your home. If possible, stay in a separate bedroom and use a separate bathroom. Avoid contact with pets and other animals. If possible, have a friend or family member care for them while you're sick. Cover your mouth and nose with a tissue when you cough or sneeze. Then throw the tissue in the trash right away. Wash your hands often, especially after you cough or sneeze. Use soap and water, and scrub for at least 20 seconds. If soap and water aren't available, use an alcohol-based hand . Don't share personal household items. These include bedding, towels, cups and glasses, and eating utensils. Clean and disinfect your home every day. Use household  or disinfectant wipes or sprays. If needed, take acetaminophen (Tylenol) or ibuprofen (Advil, Motrin) to relieve fever and body aches. Read and follow all instructions on the label. Current as of: March 26, 2021               Content Version: 13.1  © 2006-2021 [employer], [employer]. Care instructions adapted under license by [facility] ([facility]). If you have questions about a medical condition or this instruction, always ask your healthcare professional. [place] [address] any warranty or liability for your use of this information.

## 2022-01-17 NOTE — PROGRESS NOTES
[address], APRN-[facility]  [address]  Phone:  [phone]  Fax:  [phone]  Sally Parkinson is a 54 y.o. female who presents today for her medical conditions/complaints as noted below. Wen Morton c/o of URI (headache, fever, sore throat, nasal congestion, runny nose, body aches. s/s started saturday)      HPI:     URI   This is a new problem. The current episode started in the past 7 days (1/15, Saturday). The problem has been unchanged. The maximum temperature recorded prior to her arrival was 100.4 - 100.9 F. Associated symptoms include congestion, coughing, headaches, rhinorrhea and a sore throat. Pertinent negatives include no diarrhea, nausea or vomiting.        Wt Readings from Last 3 Encounters:   01/17/22 (!) 327 lb (148.3 kg)   12/01/21 (!) 348 lb 12.8 oz (158.2 kg)   11/05/21 (!) 341 lb (154.7 kg)       Temp Readings from Last 3 Encounters:   01/17/22 98.7 °F (37.1 °C)   10/18/21 97.4 °F (36.3 °C) (Infrared)   09/13/21 98.3 °F (36.8 °C) (Tympanic)       BP Readings from Last 3 Encounters:   01/17/22 126/88   12/01/21 132/72   11/05/21 132/74       Pulse Readings from Last 3 Encounters:   01/17/22 90   12/01/21 84   11/05/21 84        SpO2 Readings from Last 3 Encounters:   01/17/22 96%   10/18/21 99%   09/13/21 97%             Past Medical History:   Diagnosis Date    Gall bladder disease     Heartburn     Morbid obesity with BMI of 40.0-44.9, adult [facility])       Past Surgical History:   Procedure Laterality Date    CHOLECYSTECTOMY, LAPAROSCOPIC  8/18/2014    with OP GRAMS    OTHER SURGICAL HISTORY  2013    full mouth tooth extraction    OTHER SURGICAL HISTORY  11/2009    perineal suture repair following childbirth    TUBAL LIGATION  2012     Family History   Problem Relation Age of Onset    High Blood Pressure Mother     Other Mother         Glaucoma    Diabetes Mother     Cancer Father         lung    Heart Disease Sister    Adolfo Stroke Sister     Heart Disease Brother     Diabetes Paternal Grandmother     Cancer Paternal Grandmother         breast    Heart Disease Paternal Grandfather      Social History     Tobacco Use    Smoking status: Former Smoker     Types: Cigarettes     Quit date: 2009     Years since quittin.5    Smokeless tobacco: Never Used   Substance Use Topics    Alcohol use: No      Current Outpatient Medications   Medication Sig Dispense Refill    diclofenac (VOLTAREN) 50 MG EC tablet Take 1 tablet by mouth 3 times daily (with meals) 60 tablet 0    ibuprofen (ADVIL;MOTRIN) 200 MG tablet Take 600 mg by mouth every 6 hours as needed for Pain      acetaminophen (TYLENOL) 500 MG tablet Take 1,000 mg by mouth every 6 hours as needed for Pain       No current facility-administered medications for this visit. No Known Allergies    No exam data present    Subjective:      Review of Systems   Constitutional: Positive for chills, diaphoresis, fatigue and fever. HENT: Positive for congestion, rhinorrhea and sore throat. Respiratory: Positive for cough. Gastrointestinal: Negative for diarrhea, nausea and vomiting. Musculoskeletal: Positive for arthralgias and myalgias. Neurological: Positive for headaches. Objective:     /88 (Site: Right Upper Arm, Position: Sitting, Cuff Size: Medium Adult)   Pulse 90   Temp 98.7 °F (37.1 °C)   Resp 14   Ht 5' 6\" (1.676 m)   Wt (!) 327 lb (148.3 kg)   SpO2 96%   BMI 52.78 kg/m²     Physical Exam  Vitals and nursing note reviewed. Constitutional:       General: She is not in acute distress. Appearance: She is obese. HENT:      Head: Normocephalic and atraumatic. Right Ear: Tympanic membrane, ear canal and external ear normal.      Left Ear: Tympanic membrane, ear canal and external ear normal.      Nose: Mucosal edema, congestion and rhinorrhea present.       Mouth/Throat:      Mouth: Mucous membranes are moist.      Pharynx: Posterior oropharyngeal erythema present. No oropharyngeal exudate. Eyes:      Conjunctiva/sclera: Conjunctivae normal.      Pupils: Pupils are equal, round, and reactive to light. Cardiovascular:      Rate and Rhythm: Normal rate and regular rhythm. Pulses: Normal pulses. Heart sounds: Normal heart sounds. Pulmonary:      Effort: Pulmonary effort is normal. No respiratory distress. Breath sounds: Normal breath sounds. No wheezing. Musculoskeletal:         General: Normal range of motion. Cervical back: Normal range of motion and neck supple. Lymphadenopathy:      Cervical: No cervical adenopathy. Skin:     General: Skin is warm and dry. Capillary Refill: Capillary refill takes less than 2 seconds. Findings: No rash. Neurological:      Mental Status: She is alert and oriented to person, place, and time. Psychiatric:         Judgment: Judgment normal.         Assessment:      Diagnosis Orders   1. Viral URI  COVID-19   2. Exposure to COVID-19 virus  COVID-19     No results found for this visit on 01/17/22. Plan:       COVID swab was obtained. COVID work or school note given. Please go to the emergency room if you become short of breath, have weakness or extreme fatigue or if you feel you may be dehydrated. You may call the office if it is during normal business hours and request a nurse visit where we check you pulse oximetry/oxygen level. If your level is too low you will be sent to the hospital for further treatment. You are being provided a work or school excuse so you may quarantine until you test results are back. If you are COVID positive you will be given an additional excuse to lengthen your quarantine. Practice coughing and deep breathing every hour while awake. If you are laying down, change positions frequently. Try laying on your stomach to open up your lungs more.     If you are allowed to take tylenol or ibuprofen you may take these to relieve fever, chills or body aches. Follow up with primary care provider in 1 to 2 days if needed. We will contact with the results of your test, or you may view them on [employer]. If you are positive for COVID. You are to remain in quarantine for 5 days from when your symptoms first began. On day 6, if you are fever free you may return to work, or school, or be out of the home provided you are masked at all times. On day 10 no further masking is required unless you choose to wear a mask to protect yourself and others. Patient Instructions     COVID swab was obtained. COVID work or school note given. Please go to the emergency room if you become short of breath, have weakness or extreme fatigue or if you feel you may be dehydrated. You may call the office if it is during normal business hours and request a nurse visit where we check you pulse oximetry/oxygen level. If your level is too low you will be sent to the hospital for further treatment. You are being provided a work or school excuse so you may quarantine until you test results are back. If you are COVID positive you will be given an additional excuse to lengthen your quarantine. Practice coughing and deep breathing every hour while awake. If you are laying down, change positions frequently. Try laying on your stomach to open up your lungs more. If you are allowed to take tylenol or ibuprofen you may take these to relieve fever, chills or body aches. Follow up with primary care provider in 1 to 2 days if needed. We will contact with the results of your test, or you may view them on [employer]. If you are positive for COVID. You are to remain in quarantine for 5 days from when your symptoms first began. On day 6, if you are fever free you may return to work, or school, or be out of the home provided you are masked at all times.   On day 10 no further masking is required unless you choose to wear a mask to protect yourself and others. Patient Education        Learning About Coronavirus [phone])  What is coronavirus (COVID-19)? COVID-19 is a disease caused by a type of coronavirus. This illness was first found in December 2019. It has since spread worldwide. Coronaviruses are a large group of viruses. They cause the common cold. They also cause more serious illnesses like [place] respiratory syndrome (MERS) and severe acute respiratory syndrome (SARS). COVID-19 is caused by a novel coronavirus. That means it's a new type that has not been seen in people before. What are the symptoms? COVID-19 symptoms may include:  · Fever. · Cough. · Trouble breathing. · Chills or repeated shaking with chills. · Muscle and body aches. · Headache. · Sore throat. · New loss of taste or smell. · Vomiting. · Diarrhea. In severe cases, COVID-19 can cause pneumonia and make it hard to breathe without help from a machine. It can cause death. How is it diagnosed? COVID-19 is diagnosed with a viral test. This may also be called a PCR test or antigen test. It looks for evidence of the virus in your breathing passages or lungs (respiratory system). The test is most often done on a sample from the nose, throat, or lungs. It's sometimes done on a sample of saliva. One way a sample is collected is by putting a long swab into the back of your nose. How is it treated? Mild cases of COVID-19 can be treated at home. Serious cases need treatment in the hospital. Treatment may include medicines to reduce symptoms, plus breathing support such as oxygen therapy or a ventilator. Some people may be placed on their belly to help their oxygen levels. Treatments that may help people who have COVID-19 include:  Antiviral medicines. These medicines treat viral infections. Remdesivir is an example. Immune-based therapy. These medicines help the immune system fight COVID-19. Examples include monoclonal antibodies.   Blood thinners. These medicines help prevent blood clots. People with severe illness are at risk for blood clots. How can you protect yourself and others? · Get vaccinated. · Avoid sick people. · Cover your mouth with a tissue when you cough or sneeze. · Wash your hands often, especially after you cough or sneeze. Use soap and water, and scrub for at least 20 seconds. If soap and water aren't available, use an alcohol-based hand . · Avoid touching your mouth, nose, and eyes. Be sure to follow all instructions from the [facility] and your local health authorities. Here are some examples of specific precautions you may need to take. · If you are not fully vaccinated:  ? Wear a mask if you have to go to public areas. ? Avoid crowds and try to stay at least 6 feet away from other people. · If you have been exposed to the virus and are not fully vaccinated:  ? Stay home. Don't go to school, work, or public areas. And don't use public transportation, ride-shares, or taxis unless you have no choice. ? Wear a mask if you have to go to public areas, like the pharmacy. · Even if you're fully vaccinated, there's still a small chance you can get and spread COVID-19. If you live in an area where COVID-19 is spreading quickly, wear a mask if you have to go to indoor public areas. You might also want to wear a mask in crowded outdoor areas if you:  ? Have certain health conditions. ? Live with someone who has a compromised immune system. ? Live with someone who is not fully vaccinated. · If you have been exposed and you are fully vaccinated:  ? Talk to your doctor. You may need a COVID-19 test.  ? Wear a mask in public indoor spaces for 14 days or until you test negative for COVID-19. If you're sick:  · Leave your home only if you need to get medical care. But call the doctor's office first so they know you're coming. And wear a mask. · Wear a mask whenever you're around other people.   · Limit contact with pets and people in your home. If possible, stay in a separate bedroom and use a separate bathroom. · Clean and disinfect your home every day. Use household  and disinfectant wipes or sprays. Take special care to clean things that you touch with your hands. How can you self-isolate when you have COVID-19? If you have COVID-19, there are things you can do to help avoid spreading the virus to others. · Limit contact with people in your home. If possible, stay in a separate bedroom and use a separate bathroom. · Wear a mask when you are around other people. · If you have to leave home, avoid crowds and try to stay at least 6 feet away from other people. · Avoid contact with pets and other animals. · Cover your mouth and nose with a tissue when you cough or sneeze. Then throw it in the trash right away. · Wash your hands often, especially after you cough or sneeze. Use soap and water, and scrub for at least 20 seconds. If soap and water aren't available, use an alcohol-based hand . · Don't share personal household items. These include bedding, towels, cups and glasses, and eating utensils. · [address] in the warmest water allowed for the fabric type, and dry it completely. It's okay to wash other people's laundry with yours. · Clean and disinfect your home. Use household  and disinfectant wipes or sprays. When should you call for help? Call 911 anytime you think you may need emergency care. For example, call if you have life-threatening symptoms, such as:    · You have severe trouble breathing. (You can't talk at all.)     · You have constant chest pain or pressure.     · You are severely dizzy or lightheaded.     · You are confused or can't think clearly.     · You have pale, gray, or blue-colored skin or lips.     · You pass out (lose consciousness) or are very hard to wake up. Call your doctor now or seek immediate medical care if:    · You have moderate trouble breathing.  (You can't speak a full sentence.)     · You are coughing up blood (more than about 1 teaspoon).     · You have signs of low blood pressure. These include feeling lightheaded; being too weak to stand; and having cold, pale, clammy skin. Watch closely for changes in your health, and be sure to contact your doctor if:    · Your symptoms get worse.     · You are not getting better as expected.     · You have new or worse symptoms of anxiety, depression, nightmares, or flashbacks. Call before you go to the doctor's office. Follow their instructions. And wear a mask. Current as of: July 1, 2021               Content Version: 13.1  © 2006-2021 [employer]. Care instructions adapted under license by [facility] ([facility]). If you have questions about a medical condition or this instruction, always ask your healthcare professional. [place] [address] any warranty or liability for your use of this information. Patient Education        Coronavirus ([id number]): Care Instructions  Overview  The coronavirus disease (COVID-19) is caused by a virus. Symptoms may include a fever, a cough, and shortness of breath. It can spread through droplets from coughing and sneezing, breathing, and singing. The virus also can spread when people are in close contact with someone who is infected. Most people have mild symptoms and can take care of themselves at home. If their symptoms get worse, they may need care in a hospital. Treatment may include medicines to reduce symptoms, plus breathing support such as oxygen therapy or a ventilator. It's important to not spread the virus to others. If you have COVID-19, wear a mask anytime you are around other people. It can help stop the spread of the virus. You need to isolate yourself while you are sick. Leave your home only if you need to get medical care or testing. Follow-up care is a key part of your treatment and safety.  Be sure to make and go to all appointments, and call your doctor if you are having problems. It's also a good idea to know your test results and keep a list of the medicines you take. How can you care for yourself at home? · Get extra rest. It can help you feel better. · Drink plenty of fluids. This helps replace fluids lost from fever. Fluids may also help ease a scratchy throat. · You can take acetaminophen (Tylenol) or ibuprofen (Advil, Motrin) to reduce a fever. It may also help with muscle and body aches. Read and follow all instructions on the label. · Use petroleum jelly on sore skin. This can help if the skin around your nose and lips becomes sore from rubbing a lot with tissues. If you use oxygen, use a water-based product instead of petroleum jelly. · Keep track of symptoms such as fever and shortness of breath. This can help you know if you need to call your doctor. It can also help you know when it's safe to be around other people. · In some cases, your doctor might suggest that you get a pulse oximeter. How can you self-isolate when you have COVID-19? If you have COVID-19, there are things you can do to help avoid spreading the virus to others. · Limit contact with people in your home. If possible, stay in a separate bedroom and use a separate bathroom. · Wear a mask when you are around other people. · If you have to leave home, avoid crowds and try to stay at least 6 feet away from other people. · Avoid contact with pets and other animals. · Cover your mouth and nose with a tissue when you cough or sneeze. Then throw it in the trash right away. · Wash your hands often, especially after you cough or sneeze. Use soap and water, and scrub for at least 20 seconds. If soap and water aren't available, use an alcohol-based hand . · Don't share personal household items. These include bedding, towels, cups and glasses, and eating utensils. · [address] in the warmest water allowed for the fabric type, and dry it completely. It's okay to wash other people's laundry with yours. · Clean and disinfect your home. Use household  and disinfectant wipes or sprays. When can you end self-isolation for COVID-19? If you know or think that you have the virus, you will need to self-isolate. You can be around others after:  · It's been at least 10 days since your symptoms started and  · You haven't had a fever for 24 hours without taking medicines to lower the fever and  · Your symptoms are improving. If you tested positive but have no symptoms, you can end isolation after 10 days. But if you start to have symptoms, follow the steps above. Ask your doctor if you need to be tested before you end isolation. This is especially important if you have a weakened immune system. When should you call for help? Call 911 anytime you think you may need emergency care. For example, call if you have life-threatening symptoms, such as:    · You have severe trouble breathing. (You can't talk at all.)     · You have constant chest pain or pressure.     · You are severely dizzy or lightheaded.     · You are confused or can't think clearly.     · You have pale, gray, or blue-colored skin or lips.     · You pass out (lose consciousness) or are very hard to wake up. Call your doctor now or seek immediate medical care if:    · You have moderate trouble breathing. (You can't speak a full sentence.)     · You are coughing up blood (more than about 1 teaspoon).     · You have signs of low blood pressure. These include feeling lightheaded; being too weak to stand; and having cold, pale, clammy skin. Watch closely for changes in your health, and be sure to contact your doctor if:    · Your symptoms get worse.     · You are not getting better as expected.     · You have new or worse symptoms of anxiety, depression, nightmares, or flashbacks. Call before you go to the doctor's office. Follow their instructions. And wear a mask.   Current as of: July 1, 2021               Content Version: 13.1  © 2006-2021 [employer]. Care instructions adapted under license by [facility] ([facility]). If you have questions about a medical condition or this instruction, always ask your healthcare professional. [address] any warranty or liability for your use of this information. Patient Education        10 Things to Do When You Have COVID-19      Stay home. Don't go to school, work, or public areas. And don't use public transportation, ride-shares, or taxis unless you have no choice. Leave your home only if you need to get medical care. But call the doctor's office first so they know you're coming. And wear a mask. Ask before leaving isolation. Follow your doctor's advice about when it is safe for you to leave isolation. Wear a mask when you are around other people. It can help stop the spread of the virus. Limit contact with people in your home. If possible, stay in a separate bedroom and use a separate bathroom. Avoid contact with pets and other animals. If possible, have a friend or family member care for them while you're sick. Cover your mouth and nose with a tissue when you cough or sneeze. Then throw the tissue in the trash right away. Wash your hands often, especially after you cough or sneeze. Use soap and water, and scrub for at least 20 seconds. If soap and water aren't available, use an alcohol-based hand . Don't share personal household items. These include bedding, towels, cups and glasses, and eating utensils. Clean and disinfect your home every day. Use household  or disinfectant wipes or sprays. If needed, take acetaminophen (Tylenol) or ibuprofen (Advil, Motrin) to relieve fever and body aches. Read and follow all instructions on the label. Current as of: March 26, 2021               Content Version: 13.1  © 2006-2021 [employer].    Care instructions adapted under license by [facility] ([facility]). If you have questions about a medical condition or this instruction, always ask your healthcare professional. [place] [address] any warranty or liability for your use of this information. Patient/Caregiver instructed on use, benefit, and side effects of prescribed medications. All patient/parent/caregiver questions answered. Patient/parent/caregiver voiced understanding. Reviewed health maintenance. Instructed to continue current medications, diet and exercise. Patient agreed with treatment plan. Follow up as directed.            Electronically signed by ADITI Delatorre NP on1/17/2022

## 2022-01-18 LAB
SARS-COV-2: ABNORMAL
SARS-COV-2: DETECTED
SOURCE: ABNORMAL

## 2022-01-18 NOTE — RESULT ENCOUNTER NOTE
Please inform patient or caregiver of positive COVID results. Follow current CDC isolation guidelines.

## 2022-04-26 ENCOUNTER — OFFICE VISIT (OUTPATIENT)
Dept: FAMILY MEDICINE CLINIC | Age: 52
End: 2022-04-26
Payer: MEDICARE

## 2022-04-26 VITALS
RESPIRATION RATE: 17 BRPM | TEMPERATURE: 97.2 F | WEIGHT: 293 LBS | DIASTOLIC BLOOD PRESSURE: 80 MMHG | BODY MASS INDEX: 47.09 KG/M2 | HEIGHT: 66 IN | HEART RATE: 86 BPM | SYSTOLIC BLOOD PRESSURE: 136 MMHG | OXYGEN SATURATION: 98 %

## 2022-04-26 DIAGNOSIS — H00.012 HORDEOLUM EXTERNUM OF RIGHT LOWER EYELID: Primary | ICD-10-CM

## 2022-04-26 PROCEDURE — 3017F COLORECTAL CA SCREEN DOC REV: CPT | Performed by: NURSE PRACTITIONER

## 2022-04-26 PROCEDURE — 99212 OFFICE O/P EST SF 10 MIN: CPT

## 2022-04-26 PROCEDURE — G8427 DOCREV CUR MEDS BY ELIG CLIN: HCPCS | Performed by: NURSE PRACTITIONER

## 2022-04-26 PROCEDURE — G8417 CALC BMI ABV UP PARAM F/U: HCPCS | Performed by: NURSE PRACTITIONER

## 2022-04-26 PROCEDURE — 1036F TOBACCO NON-USER: CPT | Performed by: NURSE PRACTITIONER

## 2022-04-26 PROCEDURE — 99212 OFFICE O/P EST SF 10 MIN: CPT | Performed by: NURSE PRACTITIONER

## 2022-04-26 RX ORDER — ERYTHROMYCIN 5 MG/G
OINTMENT OPHTHALMIC
Qty: 3.5 G | Refills: 0 | Status: SHIPPED | OUTPATIENT
Start: 2022-04-26 | End: 2022-05-17

## 2022-04-26 ASSESSMENT — ENCOUNTER SYMPTOMS
EYE ITCHING: 0
EYE REDNESS: 0
EYE DISCHARGE: 0
BLURRED VISION: 0

## 2022-04-26 NOTE — PROGRESS NOTES
[address], APRN-CNP  [address]  Phone:  [phone]  Fax:  [phone]  Katrin Do is a 50 y.o. female who presents today for her medical conditions/complaints as noted below. Wen A [place] c/o of Eye Problem (Started having eye irritation on 4/25. Pt states it was just uncomfortable yesterday then woke up this morning with a red irritated bump and trouble blinking.)      HPI:     Eye Problem   The right eye is affected. This is a new problem. The current episode started yesterday. The problem has been gradually worsening. The pain is at a severity of 1/10. Pertinent negatives include no blurred vision, eye discharge, eye redness or itching. Associated symptoms comments: Irritated feeling  . She has tried nothing for the symptoms.        Wt Readings from Last 3 Encounters:   04/26/22 (!) 349 lb 9.6 oz (158.6 kg)   01/17/22 (!) 327 lb (148.3 kg)   12/01/21 (!) 348 lb 12.8 oz (158.2 kg)       Temp Readings from Last 3 Encounters:   04/26/22 97.2 °F (36.2 °C)   01/17/22 98.7 °F (37.1 °C)   10/18/21 97.4 °F (36.3 °C) (Infrared)       BP Readings from Last 3 Encounters:   04/26/22 136/80   01/17/22 126/88   12/01/21 132/72       Pulse Readings from Last 3 Encounters:   04/26/22 86   01/17/22 90   12/01/21 84        SpO2 Readings from Last 3 Encounters:   04/26/22 98%   01/17/22 96%   10/18/21 99%             Past Medical History:   Diagnosis Date    Gall bladder disease     Heartburn     Morbid obesity with BMI of 40.0-44.9, adult [facility])       Past Surgical History:   Procedure Laterality Date    CHOLECYSTECTOMY, LAPAROSCOPIC  8/18/2014    with OP GRAMS    OTHER SURGICAL HISTORY  2013    full mouth tooth extraction    OTHER SURGICAL HISTORY  11/2009    perineal suture repair following childbirth    TUBAL LIGATION  2012     Family History   Problem Relation Age of Onset    High Blood Pressure Mother     Other Mother         Glaucoma    Diabetes Mother     Cancer Father         lung    Heart Disease Sister     Stroke Sister     Heart Disease Brother     Diabetes Paternal Grandmother     Cancer Paternal Grandmother         breast    Heart Disease Paternal Grandfather      Social History     Tobacco Use    Smoking status: Former Smoker     Types: Cigarettes     Quit date: 2009     Years since quittin.8    Smokeless tobacco: Never Used   Substance Use Topics    Alcohol use: No      Current Outpatient Medications   Medication Sig Dispense Refill    erythromycin (ROMYCIN) 5 MG/GM ophthalmic ointment APPLY HALF INCH RIBBON IN LOWER EYELID OF AFFECTED EYE(S) TWICE DAILY FOR 7 DAYS. 3.5 g 0    ibuprofen (ADVIL;MOTRIN) 200 MG tablet Take 600 mg by mouth every 6 hours as needed for Pain      acetaminophen (TYLENOL) 500 MG tablet Take 1,000 mg by mouth every 6 hours as needed for Pain      diclofenac (VOLTAREN) 50 MG EC tablet Take 1 tablet by mouth 3 times daily (with meals) (Patient not taking: Reported on 2022) 60 tablet 0     No current facility-administered medications for this visit. No Known Allergies     Visual Acuity Screening    Right eye Left eye Both eyes   Without correction: 20/13 20/30 20/10   With correction:          Subjective:      Review of Systems   Eyes: Negative for blurred vision, discharge, redness and itching. Objective:     /80 (Site: Right Upper Arm, Position: Sitting, Cuff Size: Large Adult)   Pulse 86   Temp 97.2 °F (36.2 °C)   Resp 17   Ht 5' 6\" (1.676 m)   Wt (!) 349 lb 9.6 oz (158.6 kg)   SpO2 98%   BMI 56.43 kg/m²     Physical Exam  Vitals reviewed. Constitutional:       Appearance: She is obese. HENT:      Head: Normocephalic. Eyes:      General:         Right eye: Hordeolum present. No foreign body or discharge. Left eye: No foreign body, discharge or hordeolum. Extraocular Movements: Extraocular movements intact.       Conjunctiva/sclera: Conjunctivae normal.      Pupils: Pupils are equal, round, and reactive to light. Neurological:      Mental Status: She is alert. Assessment:      Diagnosis Orders   1. Hordeolum externum of right lower eyelid  erythromycin (ROMYCIN) 5 MG/GM ophthalmic ointment     No results found for this visit on 04/26/22. Plan:         Erythromycin ointment to the eye as directed. Warm compresses to the affected area 4 times a day for 20 minutes each time. Follow up with primary care provider in 1 to 2 days if needed. Patient Instructions     Erythromycin ointment to the eye as directed. Warm compresses to the affected area 4 times a day for 20 minutes each time. Follow up with primary care provider in 1 to 2 days if needed. Patient Education        Styes and Chalazia: Care Instructions  Overview     Styes and chalazia (say \"lgl-FRB-huz-uh\") are both conditions that can causeswelling of the eyelid. A stye is an infection in the root of an eyelash. The infection causes a tender red lump on the edge of the eyelid. The infection can spread until the whole eyelid becomes red and inflamed. Styes usually break open, and a tiny amount of pus drains. They usually clear up on their own in about a week, but theysometimes need treatment with antibiotics. A chalazion is a lump or cyst in the eyelid (chalazion is singular; chalazia is plural). It is caused by swelling and inflammation of deep oil glands inside the eyelid. Chalazia are usually not infected. They can take a few months toheal.  If a chalazion becomes more swollen and painful or does not go away, you mayneed to have it drained by your doctor. Follow-up care is a key part of your treatment and safety. Be sure to make and go to all appointments, and call your doctor if you are having problems. It's also a good idea to know your test results and keep alist of the medicines you take. How can you care for yourself at home?    Do not squeeze or try to open a stye or chalazion.  To help a stye or chalazion heal faster:  ? Put a warm, moist compress on your eye for 5 to 10 minutes, 3 to 6 times a day. Heat often brings a stye to a point where it drains on its own. Keep in mind that warm compresses will often increase swelling a little at first.  ? Do not use hot water or heat a wet cloth in a microwave oven. The compress may get too hot and can burn the eyelid.  If the doctor gave you antibiotic drops or ointment, use the medicine exactly as directed. Use the medicine for as long as instructed, even if your eye starts to feel better.  To put in eyedrops or ointment:  ? Tilt your head back, and pull your lower eyelid down with one finger. ? Drop or squirt the medicine inside the lower lid. ? Close your eye for 30 to 60 seconds to let the drops or ointment move around. ? Do not touch the ointment or dropper tip to your eyelashes or any other surface.  Do not wear eye makeup or contact lenses until the stye or chalazion heals.  Do not share towels, pillows, or washcloths while you have a stye. What can you do to prevent styes and chalazia? Here are some things you can do to prevent styes and chalazia.  Don't rub your eyes. This can irritate your eyes and let in bacteria. If you need to touch your eyes, wash your hands first.   Protect your eyes from dust and air pollution when you can. For example, wear safety glasses when you do katya chores like raking or mowing the lawn.  Remove eye makeup before you go to sleep. Replace eye makeup, especially mascara, at least every 6 months. Bacteria can grow in makeup.  If you get styes or chalazia often, wash your eyelids regularly with a little bit of baby shampoo mixed in warm water.  Treat any inflammation or infection of the eyelid promptly. When should you call for help?    Call your doctor now or seek immediate medical care if:     You have pain in your eye.      You have a change in vision or loss of vision.      Redness and swelling get much worse. Watch closely for changes in your health, and be sure to contact your doctor if:     Your stye does not get better in 1 week.      Your chalazion does not start to get better after several weeks. Where can you learn more? Go to https://chpepiceweb.First [email or web address] org and sign in to your REQQI account. Enter Y640 in the GIDEEN box to learn more about \"Styes and Chalazia: Care Instructions. \"     If you do not have an account, please click on the \"Sign Up Now\" link. Current as of: January 24, 2022               Content Version: 13.2  © 2006-2022 Healthwise, [facility]. Care instructions adapted under license by [facility] ([facility]). If you have questions about a medical condition or this instruction, always ask your healthcare professional. [place] [address] any warranty or liability for your use of this information. Patient/Caregiver instructed on use, benefit, and side effects of prescribed medications. All patient/parent/caregiver questions answered. Patient/parent/caregiver voiced understanding. Reviewed health maintenance. Instructed to continue current medications, diet and exercise. Patient agreed with treatment plan. Follow up as directed.            Electronically signed by ADITI Henning NP on4/26/2022

## 2022-04-26 NOTE — PATIENT INSTRUCTIONS
even if your eye starts to feel better.  To put in eyedrops or ointment:  ? Tilt your head back, and pull your lower eyelid down with one finger. ? Drop or squirt the medicine inside the lower lid. ? Close your eye for 30 to 60 seconds to let the drops or ointment move around. ? Do not touch the ointment or dropper tip to your eyelashes or any other surface.  Do not wear eye makeup or contact lenses until the stye or chalazion heals.  Do not share towels, pillows, or washcloths while you have a stye. What can you do to prevent styes and chalazia? Here are some things you can do to prevent styes and chalazia.  Don't rub your eyes. This can irritate your eyes and let in bacteria. If you need to touch your eyes, wash your hands first.   Protect your eyes from dust and air pollution when you can. For example, wear safety glasses when you do katya chores like raking or mowing the lawn.  Remove eye makeup before you go to sleep. Replace eye makeup, especially mascara, at least every 6 months. Bacteria can grow in makeup.  If you get styes or chalazia often, wash your eyelids regularly with a little bit of baby shampoo mixed in warm water.  Treat any inflammation or infection of the eyelid promptly. When should you call for help? Call your doctor now or seek immediate medical care if:     You have pain in your eye.      You have a change in vision or loss of vision.      Redness and swelling get much worse. Watch closely for changes in your health, and be sure to contact your doctor if:     Your stye does not get better in 1 week.      Your chalazion does not start to get better after several weeks. Where can you learn more? Go to https://Zaploxpepiceweb.[email or web address] org and sign in to your Ivalua account. Enter Z981 in the Comeks box to learn more about \"Styes and Chalazia: Care Instructions. \"     If you do not have an account, please click on the \"Sign Up Now\" link.  Current as of: January 24, 2022               Content Version: 13.2  © 8754-8229 Healthwise, Incorporated. Care instructions adapted under license by [facility] ([facility]). If you have questions about a medical condition or this instruction, always ask your healthcare professional. [address] any warranty or liability for your use of this information.

## 2022-05-04 ENCOUNTER — E-VISIT (OUTPATIENT)
Dept: PRIMARY CARE CLINIC | Age: 52
End: 2022-05-04
Payer: MEDICARE

## 2022-05-04 DIAGNOSIS — L03.311 CELLULITIS OF ABDOMINAL WALL: Primary | ICD-10-CM

## 2022-05-04 PROCEDURE — 99422 OL DIG E/M SVC 11-20 MIN: CPT | Performed by: NURSE PRACTITIONER

## 2022-05-04 RX ORDER — SULFAMETHOXAZOLE AND TRIMETHOPRIM 800; 160 MG/1; MG/1
1 TABLET ORAL 2 TIMES DAILY
Qty: 14 TABLET | Refills: 0 | Status: SHIPPED | OUTPATIENT
Start: 2022-05-04 | End: 2022-05-11

## 2022-05-04 NOTE — PROGRESS NOTES
Reviewed questionnaire and photos    Reviewed meds/allergies    Dx Cellulitis    Plan Rx given for bactrim, follow up with PCP if no improvement    Time spent on visit 11 min

## 2022-05-09 ENCOUNTER — OFFICE VISIT (OUTPATIENT)
Dept: FAMILY MEDICINE CLINIC | Age: 52
End: 2022-05-09
Payer: MEDICARE

## 2022-05-09 VITALS
WEIGHT: 293 LBS | HEART RATE: 84 BPM | DIASTOLIC BLOOD PRESSURE: 70 MMHG | OXYGEN SATURATION: 96 % | RESPIRATION RATE: 18 BRPM | SYSTOLIC BLOOD PRESSURE: 132 MMHG | HEIGHT: 66 IN | TEMPERATURE: 97.7 F | BODY MASS INDEX: 47.09 KG/M2

## 2022-05-09 DIAGNOSIS — M67.449 GANGLION CYST OF JOINT OF FINGER: Primary | ICD-10-CM

## 2022-05-09 PROCEDURE — 99211 OFF/OP EST MAY X REQ PHY/QHP: CPT | Performed by: NURSE PRACTITIONER

## 2022-05-09 PROCEDURE — PBSHW PBB SHADOW CHARGE: Performed by: NURSE PRACTITIONER

## 2022-05-09 PROCEDURE — 1036F TOBACCO NON-USER: CPT | Performed by: NURSE PRACTITIONER

## 2022-05-09 PROCEDURE — G8427 DOCREV CUR MEDS BY ELIG CLIN: HCPCS | Performed by: NURSE PRACTITIONER

## 2022-05-09 PROCEDURE — 99212 OFFICE O/P EST SF 10 MIN: CPT | Performed by: NURSE PRACTITIONER

## 2022-05-09 PROCEDURE — G8417 CALC BMI ABV UP PARAM F/U: HCPCS | Performed by: NURSE PRACTITIONER

## 2022-05-09 PROCEDURE — 3017F COLORECTAL CA SCREEN DOC REV: CPT | Performed by: NURSE PRACTITIONER

## 2022-05-09 PROCEDURE — 99212 OFFICE O/P EST SF 10 MIN: CPT

## 2022-05-09 NOTE — PATIENT INSTRUCTIONS
Follow up with orthopedics. Follow up with primary care provider in 1 to 2 days if needed. Patient Education        Ganglions: Care Instructions  Your Care Instructions     A ganglion is a small sac, or cyst, filled with a clear fluid that is like jelly. A ganglion may look like a bump on the hand or wrist. It also can appear on your feet, ankles, knees, or shoulders. It is not cancer. A ganglion can grow out of the protective area, or capsule, around a joint. It also can grow on a tendon sheath, which covers the ropelike tendons that connect muscle tobone. A ganglion may hurt or cause numbness if it presses on a nerve. Many ganglions do not need treatment, and they often go away on their own. But if a ganglion hurts, becomes larger, causes numbness, or limits your activity, your doctor may want to drain it with a needle and syringe or remove it withminor surgery. Follow-up care is a key part of your treatment and safety. Be sure to make and go to all appointments, and call your doctor if you are having problems. It's also a good idea to know your test results and keep alist of the medicines you take. How can you care for yourself at home?  Wear a wrist or finger splint as directed by your doctor. It will keep your wrist or hand from moving and help reduce the fluid in the cyst. This may be all you need for the ganglion to shrink and go away.  Do not smash a ganglion with a book or other heavy object. You may break a bone or otherwise injure your wrist by trying this folk remedy, and the ganglion may return anyway.  Do not try to drain the fluid by poking the ganglion with a pin or any other sharp object. You could cause an infection. When should you call for help? Call your doctor now or seek immediate medical care if:     You have signs of infection, such as:  ? Increased pain, swelling, warmth, or redness. ? Red streaks leading from the cyst.  ? Pus draining from the cyst.  ? A fever.    Watch closely for changes in your health, and be sure to contact your doctor if:     You have increasing pain.      Your ganglion is getting larger.      You still have pain or numbness from a ganglion. Where can you learn more? Go to https://TriActivepejustaeb.Gruppo MutuiOnline. org and sign in to your [employer] account. Enter Q230 in the Savalanche box to learn more about \"Ganglions: Care Instructions. \"     If you do not have an account, please click on the \"Sign Up Now\" link. Current as of: July 1, 2021               Content Version: 13.2  © 3899-5664 Healthwise, Incorporated. Care instructions adapted under license by [facility] ([facility]). If you have questions about a medical condition or this instruction, always ask your healthcare professional. [address] any warranty or liability for your use of this information.

## 2022-05-09 NOTE — PROGRESS NOTES
[address], APRN-CNP  [address]  Phone:  [phone]  Fax:  [phone]  Janes Aldana is a 50 y.o. female who presents today for her medical conditions/complaints as noted below. Wen A [place]ve[place] c/o of Cyst (Pt says she first noticed the alina on her right middle finger 3 weeks ago and thought maybe she hit it on something but ntoiced it has continued to grow since first finding it.)      HPI:     Other  This is a new problem. The current episode started 1 to 4 weeks ago (3 weeks). The problem occurs constantly. The problem has been gradually worsening. Associated symptoms include joint swelling. Exacerbated by: hitting it. She has tried nothing for the symptoms.        Wt Readings from Last 3 Encounters:   05/09/22 (!) 349 lb (158.3 kg)   04/26/22 (!) 349 lb 9.6 oz (158.6 kg)   01/17/22 (!) 327 lb (148.3 kg)       Temp Readings from Last 3 Encounters:   05/09/22 97.7 °F (36.5 °C)   04/26/22 97.2 °F (36.2 °C)   01/17/22 98.7 °F (37.1 °C)       BP Readings from Last 3 Encounters:   05/09/22 132/70   04/26/22 136/80   01/17/22 126/88       Pulse Readings from Last 3 Encounters:   05/09/22 84   04/26/22 86   01/17/22 90        SpO2 Readings from Last 3 Encounters:   05/09/22 96%   04/26/22 98%   01/17/22 96%             Past Medical History:   Diagnosis Date    Gall bladder disease     Heartburn     Morbid obesity with BMI of 40.0-44.9, adult [facility])       Past Surgical History:   Procedure Laterality Date    CHOLECYSTECTOMY, LAPAROSCOPIC  8/18/2014    with OP GRAMS    OTHER SURGICAL HISTORY  2013    full mouth tooth extraction    OTHER SURGICAL HISTORY  11/2009    perineal suture repair following childbirth    TUBAL LIGATION  2012     Family History   Problem Relation Age of Onset    High Blood Pressure Mother     Other Mother         Glaucoma    Diabetes Mother     Cancer Father         lung    Heart Disease Sister     Stroke Sister     Heart Disease Brother     Diabetes Paternal Grandmother     Cancer Paternal Grandmother         breast    Heart Disease Paternal Grandfather      Social History     Tobacco Use    Smoking status: Former Smoker     Packs/day: 0.00     Years: 10.00     Pack years: 0.00     Types: Cigarettes     Quit date: 2009     Years since quittin.8    Smokeless tobacco: Never Used   Substance Use Topics    Alcohol use: No      Current Outpatient Medications   Medication Sig Dispense Refill    sulfamethoxazole-trimethoprim (BACTRIM DS) 800-160 MG per tablet Take 1 tablet by mouth 2 times daily for 7 days 14 tablet 0    ibuprofen (ADVIL;MOTRIN) 200 MG tablet Take 600 mg by mouth every 6 hours as needed for Pain      acetaminophen (TYLENOL) 500 MG tablet Take 1,000 mg by mouth every 6 hours as needed for Pain      erythromycin (ROMYCIN) 5 MG/GM ophthalmic ointment APPLY HALF INCH RIBBON IN LOWER EYELID OF AFFECTED EYE(S) TWICE DAILY FOR 7 DAYS. (Patient not taking: Reported on 2022) 3.5 g 0     No current facility-administered medications for this visit. No Known Allergies    No exam data present    Subjective:      Review of Systems   Musculoskeletal: Positive for joint swelling. Objective:     /70 (Site: Right Upper Arm, Position: Sitting, Cuff Size: Large Adult)   Pulse 84   Temp 97.7 °F (36.5 °C)   Resp 18   Ht 5' 6\" (1.676 m)   Wt (!) 349 lb (158.3 kg)   SpO2 96%   BMI 56.33 kg/m²     Physical Exam  Musculoskeletal:      Right hand: Swelling present. No tenderness or bony tenderness. Normal range of motion. Normal capillary refill. Comments: Right third finger - dorsal surface of the PIP joint. Skin:     General: Skin is warm and dry. Assessment:      Diagnosis Orders   1. Ganglion cyst of joint of finger  5 [place], [place], Orthopedic Surgery, [place]     No results found for this visit on 22.             Plan:   Patient would like ganglion cyst drained. Follow up with orthopedics. Follow up with primary care provider in 1 to 2 days if needed. Patient Instructions     Follow up with orthopedics. Follow up with primary care provider in 1 to 2 days if needed. Patient Education        Ganglions: Care Instructions  Your Care Instructions     A ganglion is a small sac, or cyst, filled with a clear fluid that is like jelly. A ganglion may look like a bump on the hand or wrist. It also can appear on your feet, ankles, knees, or shoulders. It is not cancer. A ganglion can grow out of the protective area, or capsule, around a joint. It also can grow on a tendon sheath, which covers the ropelike tendons that connect muscle tobone. A ganglion may hurt or cause numbness if it presses on a nerve. Many ganglions do not need treatment, and they often go away on their own. But if a ganglion hurts, becomes larger, causes numbness, or limits your activity, your doctor may want to drain it with a needle and syringe or remove it withminor surgery. Follow-up care is a key part of your treatment and safety. Be sure to make and go to all appointments, and call your doctor if you are having problems. It's also a good idea to know your test results and keep alist of the medicines you take. How can you care for yourself at home?  Wear a wrist or finger splint as directed by your doctor. It will keep your wrist or hand from moving and help reduce the fluid in the cyst. This may be all you need for the ganglion to shrink and go away.  Do not smash a ganglion with a book or other heavy object. You may break a bone or otherwise injure your wrist by trying this folk remedy, and the ganglion may return anyway.  Do not try to drain the fluid by poking the ganglion with a pin or any other sharp object. You could cause an infection. When should you call for help?    Call your doctor now or seek immediate medical care if:     You have signs of infection, such as:  ? Increased pain, swelling, warmth, or redness. ? Red streaks leading from the cyst.  ? Pus draining from the cyst.  ? A fever. Watch closely for changes in your health, and be sure to contact your doctor if:     You have increasing pain.      Your ganglion is getting larger.      You still have pain or numbness from a ganglion. Where can you learn more? Go to https://TokopediapeMetconnex.Appurify. org and sign in to your RetailMLS account. Enter I466 in the adflyer box to learn more about \"Ganglions: Care Instructions. \"     If you do not have an account, please click on the \"Sign Up Now\" link. Current as of: July 1, 2021               Content Version: 13.2  © 2006-2022 Healthwise, Incorporated. Care instructions adapted under license by [facility] ([facility]). If you have questions about a medical condition or this instruction, always ask your healthcare professional. [place] [address] any warranty or liability for your use of this information. Patient/Caregiver instructed on use, benefit, and side effects of prescribed medications. All patient/parent/caregiver questions answered. Patient/parent/caregiver voiced understanding. Reviewed health maintenance. Instructed to continue current medications, diet and exercise. Patient agreed with treatment plan. Follow up as directed.            Electronically signed by ADITI Henning NP on5/9/2022

## 2022-05-11 DIAGNOSIS — L72.9: Primary | ICD-10-CM

## 2022-05-16 SDOH — HEALTH STABILITY: PHYSICAL HEALTH: ON AVERAGE, HOW MANY DAYS PER WEEK DO YOU ENGAGE IN MODERATE TO STRENUOUS EXERCISE (LIKE A BRISK WALK)?: 4 DAYS

## 2022-05-17 ENCOUNTER — HOSPITAL ENCOUNTER (OUTPATIENT)
Dept: GENERAL RADIOLOGY | Age: 52
Discharge: HOME | End: 2022-05-19
Payer: MEDICARE

## 2022-05-17 ENCOUNTER — OFFICE VISIT (OUTPATIENT)
Dept: ORTHOPEDIC SURGERY | Age: 52
End: 2022-05-17
Payer: MEDICARE

## 2022-05-17 VITALS
DIASTOLIC BLOOD PRESSURE: 81 MMHG | BODY MASS INDEX: 47.09 KG/M2 | WEIGHT: 293 LBS | SYSTOLIC BLOOD PRESSURE: 135 MMHG | HEART RATE: 77 BPM | HEIGHT: 66 IN

## 2022-05-17 DIAGNOSIS — L72.9: ICD-10-CM

## 2022-05-17 DIAGNOSIS — M67.449 GANGLION CYST OF FINGER: Primary | ICD-10-CM

## 2022-05-17 PROCEDURE — 99212 OFFICE O/P EST SF 10 MIN: CPT | Performed by: PHYSICIAN ASSISTANT

## 2022-05-17 PROCEDURE — 1036F TOBACCO NON-USER: CPT | Performed by: PHYSICIAN ASSISTANT

## 2022-05-17 PROCEDURE — 73130 X-RAY EXAM OF HAND: CPT

## 2022-05-17 PROCEDURE — G8417 CALC BMI ABV UP PARAM F/U: HCPCS | Performed by: PHYSICIAN ASSISTANT

## 2022-05-17 PROCEDURE — G8427 DOCREV CUR MEDS BY ELIG CLIN: HCPCS | Performed by: PHYSICIAN ASSISTANT

## 2022-05-17 PROCEDURE — 99203 OFFICE O/P NEW LOW 30 MIN: CPT | Performed by: PHYSICIAN ASSISTANT

## 2022-05-17 PROCEDURE — 3017F COLORECTAL CA SCREEN DOC REV: CPT | Performed by: PHYSICIAN ASSISTANT

## 2022-05-17 RX ORDER — METHYLPREDNISOLONE 4 MG/1
TABLET ORAL
Qty: 1 KIT | Refills: 0 | Status: SHIPPED | OUTPATIENT
Start: 2022-05-17 | End: 2022-05-23

## 2022-05-17 NOTE — PROGRESS NOTES
Orthopaedic Progress Note      CHIEF COMPLAINT: Right hand third digit cyst PIP joint    HISTORY OF PRESENT ILLNESS:       Ms. Frances Khan  is a 50 y.o. female  who presents with cyst that is increased in size over the last 3 weeks prior to this she is noted no cyst.  Denies any injuries. She is able to make a full fist and full extension of the fingers. She is noted to have a very small cyst localized over the PIP joint. Past Medical History:    Past Medical History:   Diagnosis Date    Gall bladder disease     Heartburn     Morbid obesity with BMI of 40.0-44.9, adult [facility])        Past Surgical History:    Past Surgical History:   Procedure Laterality Date    CHOLECYSTECTOMY, LAPAROSCOPIC  2014    with OP GRAMS    OTHER SURGICAL HISTORY      full mouth tooth extraction    OTHER SURGICAL HISTORY  2009    perineal suture repair following childbirth    TUBAL LIGATION           Current  Medications:  Current Outpatient Medications   Medication Sig Dispense Refill    methylPREDNISolone (MEDROL DOSEPACK) 4 MG tablet Take by mouth. 1 kit 0     No current facility-administered medications for this visit. Allergies:  Patient has no known allergies.     Social History:   Social History     Tobacco Use   Smoking Status Former Smoker    Packs/day: 0.00    Years: 10.00    Pack years: 0.00    Types: Cigarettes    Quit date: 2009    Years since quittin.9   Smokeless Tobacco Never Used     Social History     Substance and Sexual Activity   Alcohol Use No     Social History     Substance and Sexual Activity   Drug Use No       Family History:  Family History   Problem Relation Age of Onset    High Blood Pressure Mother     Other Mother         Glaucoma    Diabetes Mother     Cancer Father         lung    Heart Disease Sister     Stroke Sister     Heart Disease Brother     Diabetes Paternal Grandmother     Cancer Paternal Grandmother         breast    Heart Disease Paternal Grandfather        REVIEW OF SYSTEMS:  Constitutional: Denies any fever, chills. Musculoskeletal: Positive for PIP joint cyst dorsal.      PHYSICAL EXAM:  [unfilled]  General appearance:  Alert and oriented x 3. No apparent distress, appears stated age, calm and cooperative. Musculoskeletal: Full range of motion of the fingers at this time minimal pain to palpation. Small cyst localized over the PIP joint. Rashid Montoya DATA:  CBC:   Lab Results   Component Value Date    WBC 4.7 06/24/2014    HGB 13.7 06/24/2014     06/24/2014     BMP:    Lab Results   Component Value Date     06/24/2014    K 4.0 06/24/2014     06/24/2014    CO2 26 06/24/2014    BUN 10 06/24/2014    CREATININE 0.75 06/24/2014    CALCIUM 9.2 06/24/2014    GLUCOSE 89 06/24/2014     PT/INR:  No results found for: PROTIME, INR  Troponin:  No results found for: TROPONINI  No results for input(s): LIPASE, AMYLASE in the last 72 hours. No results for input(s): AST, ALT, BILIDIR, BILITOT, ALKPHOS in the last 72 hours. Uric Acid:  No components found for: URIC  Urine Culture:  No components found for: CURINE    Radiology:   No results found. X-rays personally reviewed by me of negative bony abnormality acute fractures of the right hand. Diagnosis Orders   1. Ganglion cyst of finger           PLAN:  Proceed with Medrol Dosepak see if this to help alleviate the swelling and irritation that she is having within her finger. This is not improving we will have her come back and see us we can discuss further treatment options    No orders of the defined types were placed in this encounter.        Procedure:        Electronically signed by Manoj Lugo PA-C on 5/17/2022 at 9:38 AM

## 2022-06-03 ENCOUNTER — OFFICE VISIT (OUTPATIENT)
Dept: FAMILY MEDICINE CLINIC | Age: 52
End: 2022-06-03
Payer: MEDICARE

## 2022-06-03 VITALS
WEIGHT: 293 LBS | RESPIRATION RATE: 14 BRPM | DIASTOLIC BLOOD PRESSURE: 88 MMHG | BODY MASS INDEX: 47.09 KG/M2 | SYSTOLIC BLOOD PRESSURE: 128 MMHG | HEIGHT: 66 IN | OXYGEN SATURATION: 97 % | TEMPERATURE: 98.4 F | HEART RATE: 83 BPM

## 2022-06-03 DIAGNOSIS — L03.114 CELLULITIS OF LEFT UPPER EXTREMITY: Primary | ICD-10-CM
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
## 2022-06-03 DIAGNOSIS — S50.862A INSECT BITE OF LEFT FOREARM, INITIAL ENCOUNTER: ICD-10-CM

## 2022-06-03 DIAGNOSIS — Z86.14 HISTORY OF MRSA INFECTION: ICD-10-CM

## 2022-06-03 PROCEDURE — 99213 OFFICE O/P EST LOW 20 MIN: CPT | Performed by: NURSE PRACTITIONER

## 2022-06-03 PROCEDURE — 3017F COLORECTAL CA SCREEN DOC REV: CPT | Performed by: NURSE PRACTITIONER

## 2022-06-03 PROCEDURE — PBSHW PBB SHADOW CHARGE: Performed by: NURSE PRACTITIONER

## 2022-06-03 PROCEDURE — G8417 CALC BMI ABV UP PARAM F/U: HCPCS | Performed by: NURSE PRACTITIONER

## 2022-06-03 PROCEDURE — 1036F TOBACCO NON-USER: CPT | Performed by: NURSE PRACTITIONER

## 2022-06-03 PROCEDURE — G8427 DOCREV CUR MEDS BY ELIG CLIN: HCPCS | Performed by: NURSE PRACTITIONER

## 2022-06-03 PROCEDURE — 99211 OFF/OP EST MAY X REQ PHY/QHP: CPT | Performed by: NURSE PRACTITIONER

## 2022-06-03 RX ORDER — SULFAMETHOXAZOLE AND TRIMETHOPRIM 800; 160 MG/1; MG/1
1 TABLET ORAL 2 TIMES DAILY
Qty: 10 TABLET | Refills: 0 | Status: SHIPPED | OUTPATIENT
Start: 2022-06-03 | End: 2022-06-08

## 2022-06-03 RX ORDER — PREDNISONE 20 MG/1
20 TABLET ORAL DAILY
Qty: 5 TABLET | Refills: 0 | Status: SHIPPED | OUTPATIENT
Start: 2022-06-03 | End: 2022-06-08

## 2022-06-03 RX ORDER — CEPHALEXIN 500 MG/1
500 CAPSULE ORAL 3 TIMES DAILY
Qty: 15 CAPSULE | Refills: 0 | Status: SHIPPED | OUTPATIENT
Start: 2022-06-03 | End: 2022-06-08

## 2022-06-03 ASSESSMENT — PATIENT HEALTH QUESTIONNAIRE - PHQ9
SUM OF ALL RESPONSES TO PHQ QUESTIONS 1-9: 0
1. LITTLE INTEREST OR PLEASURE IN DOING THINGS: 0
SUM OF ALL RESPONSES TO PHQ QUESTIONS 1-9: 0
2. FEELING DOWN, DEPRESSED OR HOPELESS: 0
SUM OF ALL RESPONSES TO PHQ9 QUESTIONS 1 & 2: 0

## 2022-06-03 ASSESSMENT — ENCOUNTER SYMPTOMS
COUGH: 0
COLOR CHANGE: 1
NAUSEA: 0
DIFFICULTY BREATHING: 0
VOMITING: 0

## 2022-06-03 NOTE — PROGRESS NOTES
[address], APRN-CNP  [address]  Phone:  [phone]  Fax:  [phone]  Tequila Masterson is a 50 y.o. female who presents today for her medical conditions/complaints as noted below. Wen A Praveen c/o of Insect Bite (Pt presents to walkin with complaint of a bug bite that happened on monday while at a water park, pt states she did not get in any water. The bit is on her left forearm pt denies itching but is painful and rates the severity at 6/10. Bite is approx 1.5 x 2in is red, hard, and hot to the touch.)      HPI:     Animal Bite   The incident occurred more than 2 days ago (Monday). The incident occurred at a playground. There is an injury to the left forearm. The pain is moderate. Pertinent negatives include no visual disturbance, no nausea, no vomiting, no headaches, no cough and no difficulty breathing. Tried benadryl, calamine and janet 10. Previous history of MRSA infection.     Wt Readings from Last 3 Encounters:   06/03/22 (!) 351 lb 3.2 oz (159.3 kg)   05/17/22 (!) 349 lb (158.3 kg)   05/09/22 (!) 349 lb (158.3 kg)       Temp Readings from Last 3 Encounters:   06/03/22 98.4 °F (36.9 °C)   05/09/22 97.7 °F (36.5 °C)   04/26/22 97.2 °F (36.2 °C)       BP Readings from Last 3 Encounters:   06/03/22 128/88   05/17/22 135/81   05/09/22 132/70       Pulse Readings from Last 3 Encounters:   06/03/22 83   05/17/22 77   05/09/22 84        SpO2 Readings from Last 3 Encounters:   06/03/22 97%   05/09/22 96%   04/26/22 98%             Past Medical History:   Diagnosis Date    Gall bladder disease     Heartburn     Morbid obesity with BMI of 40.0-44.9, adult [facility])       Past Surgical History:   Procedure Laterality Date    CHOLECYSTECTOMY, LAPAROSCOPIC  8/18/2014    with OP GRAMS    OTHER SURGICAL HISTORY  2013    full mouth tooth extraction    OTHER SURGICAL HISTORY  11/2009    perineal suture repair following childbirth    TUBAL LIGATION      Family History   Problem Relation Age of Onset    High Blood Pressure Mother     Other Mother         Glaucoma    Diabetes Mother     Cancer Father         lung    Heart Disease Sister     Stroke Sister     Heart Disease Brother     Diabetes Paternal Grandmother     Cancer Paternal Grandmother         breast    Heart Disease Paternal Grandfather      Social History     Tobacco Use    Smoking status: Former Smoker     Packs/day: 0.00     Years: 10.00     Pack years: 0.00     Types: Cigarettes     Quit date: 2009     Years since quittin.9    Smokeless tobacco: Never Used   Substance Use Topics    Alcohol use: No      Current Outpatient Medications   Medication Sig Dispense Refill    sulfamethoxazole-trimethoprim (BACTRIM DS) 800-160 MG per tablet Take 1 tablet by mouth 2 times daily for 5 days Take with food. 10 tablet 0    cephALEXin (KEFLEX) 500 MG capsule Take 1 capsule by mouth 3 times daily for 5 days 15 capsule 0    predniSONE (DELTASONE) 20 MG tablet Take 1 tablet by mouth daily for 5 days 5 tablet 0     No current facility-administered medications for this visit. No Known Allergies    No exam data present    Subjective:      Review of Systems   Eyes: Negative for visual disturbance. Respiratory: Negative for cough. Gastrointestinal: Negative for nausea and vomiting. Skin: Positive for color change and rash. Neurological: Negative for headaches. Objective:     /88 (Site: Right Upper Arm, Position: Sitting, Cuff Size: Large Adult)   Pulse 83   Temp 98.4 °F (36.9 °C)   Resp 14   Ht 5' 6\" (1.676 m)   Wt (!) 351 lb 3.2 oz (159.3 kg)   LMP 2022   SpO2 97%   BMI 56.69 kg/m²     Physical Exam  Vitals reviewed. Constitutional:       Appearance: She is obese. Pulmonary:      Effort: Pulmonary effort is normal.   Skin:     General: Skin is warm and dry. Capillary Refill: Capillary refill takes less than 2 seconds.       Findings: Erythema and rash present. Rash is macular. Neurological:      Mental Status: She is alert. Assessment:      Diagnosis Orders   1. Cellulitis of left upper extremity  sulfamethoxazole-trimethoprim (BACTRIM DS) 800-160 MG per tablet    cephALEXin (KEFLEX) 500 MG capsule   2. History of MRSA infection  sulfamethoxazole-trimethoprim (BACTRIM DS) 800-160 MG per tablet   3. Insect bite of left forearm, initial encounter  predniSONE (DELTASONE) 20 MG tablet     No results found for this visit on 06/03/22. Plan:       Take the prednisone with food, preferably first thing in the morning. Bactrim and keflex as directed. Follow up with primary care provider in 1 to 2 days if needed. Patient Instructions     Take the prednisone with food, preferably first thing in the morning. Bactrim and keflex as directed. Follow up with primary care provider in 1 to 2 days if needed. Patient Education        Cellulitis: Care Instructions  Your Care Instructions     Cellulitis is a skin infection caused by bacteria, most often strep or staph. It often occurs after a break in the skin from a scrape, cut, bite, orpuncture, or after a rash. Cellulitis may be treated without doing tests to find out what caused it. But your doctor may do tests, if needed, to look for a specific bacteria, like methicillin-resistant Staphylococcus aureus (MRSA). The doctor has checked you carefully, but problems can develop later. If you notice any problems or new symptoms, get medical treatment right away. Follow-up care is a key part of your treatment and safety. Be sure to make and go to all appointments, and call your doctor if you are having problems. It's also a good idea to know your test results and keep alist of the medicines you take. How can you care for yourself at home?  Take your antibiotics as directed. Do not stop taking them just because you feel better. You need to take the full course of antibiotics.    Prop up the infected area on pillows to reduce pain and swelling. Try to keep the area above the level of your heart as often as you can.  If your doctor told you how to care for your wound, follow your doctor's instructions. If you did not get instructions, follow this general advice:  ? Wash the wound with clean water 2 times a day. Don't use hydrogen peroxide or alcohol, which can slow healing. ? You may cover the wound with a thin layer of petroleum jelly, such as Vaseline, and a nonstick bandage. ? Apply more petroleum jelly and replace the bandage as needed.  Be safe with medicines. Take pain medicines exactly as directed. ? If the doctor gave you a prescription medicine for pain, take it as prescribed. ? If you are not taking a prescription pain medicine, ask your doctor if you can take an over-the-counter medicine. To prevent cellulitis in the future   Try to prevent cuts, scrapes, or other injuries to your skin. Cellulitis most often occurs where there is a break in the skin.  If you get a scrape, cut, mild burn, or bite, wash the wound with clean water as soon as you can to help avoid infection. Don't use hydrogen peroxide or alcohol, which can slow healing.  If you have swelling in your legs (edema), support stockings and good skin care may help prevent leg sores and cellulitis.  Take care of your feet, especially if you have diabetes or other conditions that increase the risk of infection. Wear shoes and socks. Do not go barefoot. If you have athlete's foot or other skin problems on your feet, talk to your doctor about how to treat them. When should you call for help? Call your doctor now or seek immediate medical care if:     You have signs that your infection is getting worse, such as:  ? Increased pain, swelling, warmth, or redness. ? Red streaks leading from the area. ? Pus draining from the area. ? A fever.      You get a rash.    Watch closely for changes in your health, and be sure to contact your doctor if:     You do not get better as expected. Where can you learn more? Go to https://chpepiceweb.Close.io. org and sign in to your Apixio account. Enter D324 in the [facility] box to learn more about \"Cellulitis: Care Instructions. \"     If you do not have an account, please click on the \"Sign Up Now\" link. Current as of: November 15, 2021               Content Version: 13.2  © 2006-2022 [employer]. Care instructions adapted under license by [facility] ([facility]). If you have questions about a medical condition or this instruction, always ask your healthcare professional. [place] [address] any warranty or liability for your use of this information. Patient Education        Insect Stings and Bites: Care Instructions  Overview  Stings and bites from bees, wasps, ants, and other insects often cause pain, swelling, redness, and itching. In some people, especially children, the redness and swelling may be worse. It may extend several inches beyond the affected area. But in most cases, stings and bites don't cause reactions allover the body. If you have had a reaction to an insect sting or bite, you are at risk for areaction if you get stung or bitten again. Follow-up care is a key part of your treatment and safety. Be sure to make and go to all appointments, and call your doctor if you are having problems. It's also a good idea to know your test results and keep alist of the medicines you take. How can you care for yourself at home?  Do not scratch or rub the skin where the sting or bite occurred.  Put a cold pack or ice cube on the area. Put a thin cloth between the ice and your skin. For some people, a paste of baking soda mixed with a little water helps relieve pain and decrease the reaction.  Take an over-the-counter antihistamine to help relieve swelling, redness, and itching.  Calamine lotion or hydrocortisone cream may also https://chpepiceweb.[email or web address] org and sign in to your DaoliCloud account. Enter P390 in the [place] box to learn more about \"Insect Stings and Bites: Care Instructions. \"     If you do not have an account, please click on the \"Sign Up Now\" link. Current as of: July 1, 2021               Content Version: 13.2  © 6589-9943 Healthwise, [facility]. Care instructions adapted under license by [facility] ([facility]). If you have questions about a medical condition or this instruction, always ask your healthcare professional. [place] [address] any warranty or liability for your use of this information. Patient/Caregiver instructed on use, benefit, and side effects of prescribed medications. All patient/parent/caregiver questions answered. Patient/parent/caregiver voiced understanding. Reviewed health maintenance. Instructed to continue current medications, diet and exercise. Patient agreed with treatment plan. Follow up as directed.            Electronically signed by ADITI Gould NP on6/3/2022

## 2022-06-03 NOTE — PATIENT INSTRUCTIONS
Take the prednisone with food, preferably first thing in the morning. Bactrim and keflex as directed. Follow up with primary care provider in 1 to 2 days if needed. Patient Education        Cellulitis: Care Instructions  Your Care Instructions     Cellulitis is a skin infection caused by bacteria, most often strep or staph. It often occurs after a break in the skin from a scrape, cut, bite, orpuncture, or after a rash. Cellulitis may be treated without doing tests to find out what caused it. But your doctor may do tests, if needed, to look for a specific bacteria, like methicillin-resistant Staphylococcus aureus (MRSA). The doctor has checked you carefully, but problems can develop later. If you notice any problems or new symptoms, get medical treatment right away. Follow-up care is a key part of your treatment and safety. Be sure to make and go to all appointments, and call your doctor if you are having problems. It's also a good idea to know your test results and keep alist of the medicines you take. How can you care for yourself at home?  Take your antibiotics as directed. Do not stop taking them just because you feel better. You need to take the full course of antibiotics.  Prop up the infected area on pillows to reduce pain and swelling. Try to keep the area above the level of your heart as often as you can.  If your doctor told you how to care for your wound, follow your doctor's instructions. If you did not get instructions, follow this general advice:  ? Wash the wound with clean water 2 times a day. Don't use hydrogen peroxide or alcohol, which can slow healing. ? You may cover the wound with a thin layer of petroleum jelly, such as Vaseline, and a nonstick bandage. ? Apply more petroleum jelly and replace the bandage as needed.  Be safe with medicines. Take pain medicines exactly as directed. ? If the doctor gave you a prescription medicine for pain, take it as prescribed.   ? If you are not taking a prescription pain medicine, ask your doctor if you can take an over-the-counter medicine. To prevent cellulitis in the future   Try to prevent cuts, scrapes, or other injuries to your skin. Cellulitis most often occurs where there is a break in the skin.  If you get a scrape, cut, mild burn, or bite, wash the wound with clean water as soon as you can to help avoid infection. Don't use hydrogen peroxide or alcohol, which can slow healing.  If you have swelling in your legs (edema), support stockings and good skin care may help prevent leg sores and cellulitis.  Take care of your feet, especially if you have diabetes or other conditions that increase the risk of infection. Wear shoes and socks. Do not go barefoot. If you have athlete's foot or other skin problems on your feet, talk to your doctor about how to treat them. When should you call for help? Call your doctor now or seek immediate medical care if:     You have signs that your infection is getting worse, such as:  ? Increased pain, swelling, warmth, or redness. ? Red streaks leading from the area. ? Pus draining from the area. ? A fever.      You get a rash. Watch closely for changes in your health, and be sure to contact your doctor if:     You do not get better as expected. Where can you learn more? Go to https://Weroom.Bitium. org and sign in to your hulu account. Enter S351 in the [facility] box to learn more about \"Cellulitis: Care Instructions. \"     If you do not have an account, please click on the \"Sign Up Now\" link. Current as of: November 15, 2021               Content Version: 13.2  © 2221-5629 Healthwise, [employer]. Care instructions adapted under license by [facility] ([facility]). If you have questions about a medical condition or this instruction, always ask your healthcare professional. [address] any warranty or liability for your use of this information. Patient Education        Insect Stings and Bites: Care Instructions  Overview  Stings and bites from bees, wasps, ants, and other insects often cause pain, swelling, redness, and itching. In some people, especially children, the redness and swelling may be worse. It may extend several inches beyond the affected area. But in most cases, stings and bites don't cause reactions allover the body. If you have had a reaction to an insect sting or bite, you are at risk for areaction if you get stung or bitten again. Follow-up care is a key part of your treatment and safety. Be sure to make and go to all appointments, and call your doctor if you are having problems. It's also a good idea to know your test results and keep alist of the medicines you take. How can you care for yourself at home?  Do not scratch or rub the skin where the sting or bite occurred.  Put a cold pack or ice cube on the area. Put a thin cloth between the ice and your skin. For some people, a paste of baking soda mixed with a little water helps relieve pain and decrease the reaction.  Take an over-the-counter antihistamine to help relieve swelling, redness, and itching. Calamine lotion or hydrocortisone cream may also help. Do not give antihistamines to your child unless you have checked with the doctor first. Read and follow all instructions on the label.  Be safe with medicines. If your doctor prescribed medicine for your allergy, take it exactly as prescribed. Call your doctor if you think you are having a problem with your medicine. You will get more details on the specific medicines your doctor prescribes.  Your doctor may prescribe a shot of epinephrine to carry with you in case you have a severe reaction. Learn how and when to give yourself the shot, and keep it with you at all times. Make sure it has not .  Go to the emergency room anytime you have a severe reaction.  Go even if you have given yourself epinephrine and are feeling better. Symptoms can come back. When should you call for help? Call 911 anytime you think you may need emergency care. For example, call if:     You have symptoms of a severe allergic reaction. These may include:  ? Sudden raised, red areas (hives) all over your body. ? Swelling of the throat, mouth, lips, or tongue. ? Trouble breathing. ? Passing out (losing consciousness). Or you may feel very lightheaded or suddenly feel weak, confused, or restless. Call your doctor now or seek immediate medical care if:     You have symptoms of an allergic reaction not right at the sting or bite, such as:  ? A rash or small area of hives (raised, red areas on the skin). ? Itching. ? Swelling. ? Belly pain, nausea, or vomiting.      You have a lot of swelling around the site (such as your entire arm or leg is swollen).      You have signs of infection, such as:  ? Increased pain, swelling, redness, or warmth around the sting. ? Red streaks leading from the area. ? Pus draining from the sting. ? A fever. Watch closely for changes in your health, and be sure to contact your doctor if:     You do not get better as expected. Where can you learn more? Go to https://VyconpeThreatTrack Securityeb.Genoom. org and sign in to your TouristEye account. Enter P390 in the [employer] box to learn more about \"Insect Stings and Bites: Care Instructions. \"     If you do not have an account, please click on the \"Sign Up Now\" link. Current as of: July 1, 2021               Content Version: 13.2  © 8464-7806 [employer]. Care instructions adapted under license by [employer]. If you have questions about a medical condition or this instruction, always ask your healthcare professional. [place] [address] any warranty or liability for your use of this information.

## 2022-07-14 ENCOUNTER — OFFICE VISIT (OUTPATIENT)
Dept: FAMILY MEDICINE CLINIC | Age: 52
End: 2022-07-14
Payer: MEDICARE

## 2022-07-14 VITALS
OXYGEN SATURATION: 98 % | TEMPERATURE: 98.2 F | HEART RATE: 79 BPM | RESPIRATION RATE: 16 BRPM | BODY MASS INDEX: 47.09 KG/M2 | HEIGHT: 66 IN | SYSTOLIC BLOOD PRESSURE: 124 MMHG | WEIGHT: 293 LBS | DIASTOLIC BLOOD PRESSURE: 86 MMHG

## 2022-07-14 DIAGNOSIS — Z11.4 ENCOUNTER FOR SCREENING FOR HIV: ICD-10-CM

## 2022-07-14 DIAGNOSIS — Z11.59 ENCOUNTER FOR HEPATITIS C SCREENING TEST FOR LOW RISK PATIENT: ICD-10-CM

## 2022-07-14 DIAGNOSIS — Z13.1 SCREENING FOR DIABETES MELLITUS (DM): ICD-10-CM

## 2022-07-14 DIAGNOSIS — Z00.00 WELLNESS EXAMINATION: ICD-10-CM

## 2022-07-14 DIAGNOSIS — Z23 NEED FOR TDAP VACCINATION: ICD-10-CM

## 2022-07-14 DIAGNOSIS — E66.01 CLASS 3 SEVERE OBESITY DUE TO EXCESS CALORIES WITHOUT SERIOUS COMORBIDITY WITH BODY MASS INDEX (BMI) OF 50.0 TO 59.9 IN ADULT (HCC): Primary | ICD-10-CM

## 2022-07-14 DIAGNOSIS — Z12.31 ENCOUNTER FOR SCREENING MAMMOGRAM FOR MALIGNANT NEOPLASM OF BREAST: ICD-10-CM

## 2022-07-14 DIAGNOSIS — Z12.11 SCREEN FOR COLON CANCER: ICD-10-CM

## 2022-07-14 DIAGNOSIS — Z13.220 ENCOUNTER FOR SCREENING FOR LIPOID DISORDERS: ICD-10-CM

## 2022-07-14 DIAGNOSIS — L98.9 SKIN LESION: ICD-10-CM

## 2022-07-14 LAB
ALBUMIN/GLOBULIN RATIO: 1.2 G/DL
ALBUMIN: 3.7 G/DL (ref 3.5–5)
ALP BLD-CCNC: 63 UNITS/L (ref 38–126)
ALT SERPL-CCNC: 26 UNITS/L (ref 4–35)
ANION GAP SERPL CALCULATED.3IONS-SCNC: 3.5 MMOL/L
AST SERPL-CCNC: 23 UNITS/L (ref 14–36)
BASOPHILS %: 1.97 (ref 0–3)
BASOPHILS ABSOLUTE: 0.12 (ref 0–0.3)
BILIRUB SERPL-MCNC: 0.6 MG/DL (ref 0.2–1.3)
BUN BLDV-MCNC: 7 MG/DL (ref 7–17)
CALCIUM SERPL-MCNC: 8.5 MG/DL (ref 8.4–10.2)
CHLORIDE BLD-SCNC: 109 MMOL/L (ref 98–120)
CHOLESTEROL/HDL RATIO: 3.56 RATIO (ref 0–4.5)
CHOLESTEROL: 210 MG/DL (ref 50–200)
CO2: 24 MMOL/L (ref 22–31)
CREAT SERPL-MCNC: 0.6 MG/DL (ref 0.5–1)
EOSINOPHILS %: 5.58 (ref 0–10)
EOSINOPHILS ABSOLUTE: 0.35 (ref 0–1.1)
GFR CALCULATED: > 60
GLOBULIN: 3.1 G/DL
GLUCOSE: 101 MG/DL (ref 65–105)
HBA1C MFR BLD: 5.6 %
HCT VFR BLD CALC: 42.8 % (ref 37–47)
HDLC SERPL-MCNC: 59 MG/DL (ref 36–68)
HEMOGLOBIN: 14.2 (ref 12–16)
LDL CHOLESTEROL CALCULATED: 132.8 MG/DL (ref 0–160)
LYMPHOCYTE %: 28.75 (ref 20–51.1)
LYMPHOCYTES ABSOLUTE: 1.8 (ref 1–5.5)
MCH RBC QN AUTO: 30.4 PG (ref 28.5–32.5)
MCHC RBC AUTO-ENTMCNC: 33.1 G/DL (ref 32–37)
MCV RBC AUTO: 91.7 FL (ref 80–94)
MONOCYTES %: 8.58 (ref 1.7–9.3)
MONOCYTES ABSOLUTE: 0.54 (ref 0.1–1)
NEUTROPHILS %: 55.12 (ref 42.2–75.2)
NEUTROPHILS ABSOLUTE: 3.45 (ref 2–8.1)
PDW BLD-RTO: 12.5 % (ref 8.5–15.5)
PLATELET # BLD: 265.9 THOU/MM3 (ref 130–400)
POTASSIUM SERPL-SCNC: 3.8 MMOL/L (ref 3.6–5)
RBC: 4.66 M/UL (ref 4.2–5.4)
SODIUM BLD-SCNC: 137 MMOL/L (ref 135–145)
TOTAL PROTEIN, SERUM: 6.8 G/DL (ref 6.3–8.2)
TRIGL SERPL-MCNC: 91 MG/DL (ref 10–250)
TSH REFLEX FT4: 0.83 MIU/ML (ref 0.49–4.67)
VLDLC SERPL CALC-MCNC: 18 MG/DL (ref 0–50)
WBC: 6.3 THOU/ML3 (ref 4.8–10.8)

## 2022-07-14 PROCEDURE — G8427 DOCREV CUR MEDS BY ELIG CLIN: HCPCS | Performed by: FAMILY MEDICINE

## 2022-07-14 PROCEDURE — 83036 HEMOGLOBIN GLYCOSYLATED A1C: CPT | Performed by: FAMILY MEDICINE

## 2022-07-14 PROCEDURE — 90715 TDAP VACCINE 7 YRS/> IM: CPT | Performed by: FAMILY MEDICINE

## 2022-07-14 PROCEDURE — 3017F COLORECTAL CA SCREEN DOC REV: CPT | Performed by: FAMILY MEDICINE

## 2022-07-14 PROCEDURE — PBSHW TDAP, BOOSTRIX, (AGE 10 YRS+), IM: Performed by: FAMILY MEDICINE

## 2022-07-14 PROCEDURE — 1036F TOBACCO NON-USER: CPT | Performed by: FAMILY MEDICINE

## 2022-07-14 PROCEDURE — 99214 OFFICE O/P EST MOD 30 MIN: CPT | Performed by: FAMILY MEDICINE

## 2022-07-14 PROCEDURE — G8417 CALC BMI ABV UP PARAM F/U: HCPCS | Performed by: FAMILY MEDICINE

## 2022-07-14 PROCEDURE — PBSHW POCT GLYCOSYLATED HEMOGLOBIN (HGB A1C): Performed by: FAMILY MEDICINE

## 2022-07-14 PROCEDURE — 99212 OFFICE O/P EST SF 10 MIN: CPT | Performed by: FAMILY MEDICINE

## 2022-07-14 ASSESSMENT — ENCOUNTER SYMPTOMS
COUGH: 0
CHEST TIGHTNESS: 0
CHOKING: 0
DIARRHEA: 0
SHORTNESS OF BREATH: 0
CONSTIPATION: 0
PHOTOPHOBIA: 0
WHEEZING: 0
VOMITING: 0
ABDOMINAL PAIN: 0

## 2022-07-14 NOTE — PROGRESS NOTES
Name: Heather Gaitan  DOS: 2022  MRN: [id number]      Subjective:  Heather Gaitan is a 50 y.o. female being seen for   Chief Complaint   Patient presents with    Other     Pt is here for a mole removal        Vitals:    22 1101   BP: 124/86   Pulse: 79   Resp: 16   Temp: 98.2 °F (36.8 °C)   SpO2: 98%     No Known Allergies  Past Medical History:   Diagnosis Date    Gall bladder disease     Heartburn     Morbid obesity with BMI of 40.0-44.9, adult [facility])      Past Surgical History:   Procedure Laterality Date    CHOLECYSTECTOMY, LAPAROSCOPIC  2014    with OP GRAMS    OTHER SURGICAL HISTORY      full mouth tooth extraction    OTHER SURGICAL HISTORY  2009    perineal suture repair following childbirth    TUBAL LIGATION  2012     Social History     Socioeconomic History    Marital status: Single     Spouse name: None    Number of children: None    Years of education: None    Highest education level: None   Occupational History    None   Tobacco Use    Smoking status: Former Smoker     Packs/day: 0.00     Years: 10.00     Pack years: 0.00     Types: Cigarettes     Quit date: 2009     Years since quittin.0    Smokeless tobacco: Never Used   Substance and Sexual Activity    Alcohol use: No    Drug use: No    Sexual activity: None   Other Topics Concern    None   Social History Narrative    None     Social Determinants of Health     Financial Resource Strain: Low Risk     Difficulty of Paying Living Expenses: Not hard at all   Food Insecurity: No Food Insecurity    Worried About Running Out of Food in the Last Year: Never true    Gypsy of Food in the Last Year: Never true   Transportation Needs:     Lack of Transportation (Medical): Not on file    Lack of Transportation (Non-Medical):  Not on file   Physical Activity: Unknown    Days of Exercise per Week: 4 days    Minutes of Exercise per Session: Not on file   Stress:     Feeling of Stress : Not on file Social Connections:     Frequency of Communication with Friends and Family: Not on file    Frequency of Social Gatherings with Friends and Family: Not on file    Attends Adventism Services: Not on file    Active Member of Clubs or Organizations: Not on file    Attends Club or Organization Meetings: Not on file    Marital Status: Not on file   Intimate Partner Violence: Not At Risk    Fear of Current or Ex-Partner: No    Emotionally Abused: No    Physically Abused: No    Sexually Abused: No   Housing Stability:     Unable to Pay for Housing in the Last Year: Not on file    Number of [place] in the Last Year: Not on file    Unstable Housing in the Last Year: Not on file       No current outpatient medications on file. No current facility-administered medications for this visit. Objective:  Pt states she feels great today here to be established. Pt has a raised lesion her entire life of the right medial aspect of arm. She was in the pool 07/05/2022 she scraped It on concrete so she wants it off. Review of Systems   Constitutional: Negative for fatigue and unexpected weight change. Eyes: Negative for photophobia and visual disturbance. Respiratory: Negative for cough, choking, chest tightness, shortness of breath and wheezing. Cardiovascular: Negative for chest pain, palpitations and leg swelling. Gastrointestinal: Negative for abdominal pain, constipation, diarrhea and vomiting. Genitourinary: Negative for difficulty urinating, hematuria, vaginal bleeding, vaginal discharge and vaginal pain. Musculoskeletal: Negative for arthralgias and myalgias. Skin: Negative for rash and wound. Neurological: Negative for dizziness, tremors, seizures, syncope, speech difficulty, weakness, light-headedness, numbness and headaches. Hematological: Negative for adenopathy. Does not bruise/bleed easily.    Psychiatric/Behavioral: Negative for agitation, confusion, decreased concentration, self-injury, sleep disturbance and suicidal ideas. The patient is not nervous/anxious. Physical Exam  Constitutional:       General: She is not in acute distress. Appearance: Normal appearance. She is obese. She is not ill-appearing, toxic-appearing or diaphoretic. HENT:      Head: Normocephalic and atraumatic. Right Ear: Tympanic membrane, ear canal and external ear normal. There is no impacted cerumen. Left Ear: Tympanic membrane, ear canal and external ear normal. There is no impacted cerumen. Nose: Nose normal. No congestion or rhinorrhea. Mouth/Throat:      Mouth: Mucous membranes are moist.      Pharynx: Oropharynx is clear. No oropharyngeal exudate or posterior oropharyngeal erythema. Eyes:      Extraocular Movements: Extraocular movements intact. Conjunctiva/sclera: Conjunctivae normal.      Pupils: Pupils are equal, round, and reactive to light. Cardiovascular:      Rate and Rhythm: Normal rate and regular rhythm. Pulses: Normal pulses. Heart sounds: Normal heart sounds. No murmur heard. Pulmonary:      Effort: Pulmonary effort is normal.      Breath sounds: Normal breath sounds. No wheezing, rhonchi or rales. Abdominal:      General: Abdomen is flat. Bowel sounds are normal. There is no distension. Palpations: Abdomen is soft. There is no mass. Tenderness: There is no abdominal tenderness. There is no right CVA tenderness, left CVA tenderness or guarding. Musculoskeletal:         General: Normal range of motion. Cervical back: Normal range of motion and neck supple. Right lower leg: No edema. Left lower leg: No edema. Lymphadenopathy:      Cervical: No cervical adenopathy. Skin:     General: Skin is warm. Capillary Refill: Capillary refill takes less than 2 seconds. Findings: Lesion (raised irritated round skin lesion about 2cm in diameter does not look infected) present.    Neurological: General: No focal deficit present. Mental Status: She is alert and oriented to person, place, and time. Motor: No weakness. Coordination: Coordination normal.      Gait: Gait normal.   Psychiatric:         Mood and Affect: Mood normal.         Behavior: Behavior normal.         Thought Content: Thought content normal.          Assessment:   Diagnosis Orders   1. Class 3 severe obesity due to excess calories without serious comorbidity with body mass index (BMI) of 50.0 to 59.9 in adult [facility])     2. Wellness examination  CBC with Auto Differential    Comprehensive Metabolic Panel    Lipid Panel    TSH with Reflex    HIV Screen    Hepatitis C Antibody    POCT glycosylated hemoglobin (Hb A1C)   3. Screening for diabetes mellitus (DM)  POCT glycosylated hemoglobin (Hb A1C)   4. Encounter for hepatitis C screening test for low risk patient  Hepatitis C Antibody   5. Encounter for screening for HIV  HIV Screen   6. Encounter for screening for lipoid disorders  Lipid Panel   7. Encounter for screening mammogram for malignant neoplasm of breast  [facility] NYLA DIGITAL SCREEN BILATERAL   8. Screen for colon cancer  Ambulatory referral to General Surgery   9. Need for Tdap vaccination  Tdap, BOOSTRIX, (age 8 yrs+), IM   10.  Skin lesion  Amb External Referral To Dermatology         Plan:  Orders Placed This Encounter   Procedures    ART NYLA DIGITAL SCREEN BILATERAL     Standing Status:   Future     Standing Expiration Date:   8/14/2022     Order Specific Question:   Reason for exam:     Answer:   Encounter for screening mammogram for malignant neoplasm of breast    Tdap, BOOSTRIX, (age 8 yrs+), IM    CBC with Auto Differential     Standing Status:   Future     Standing Expiration Date:   8/14/2022    Comprehensive Metabolic Panel     Standing Status:   Future     Standing Expiration Date:   8/14/2022    Lipid Panel     Standing Status:   Future     Standing Expiration Date:   8/14/2022     Order Specific Question:   Is Patient Fasting?/# of Hours     Answer: Yes    TSH with Reflex     Standing Status:   Future     Standing Expiration Date:   8/14/2022    HIV Screen     Standing Status:   Future     Standing Expiration Date:   8/14/2022    Hepatitis C Antibody     Standing Status:   Future     Standing Expiration Date:   8/14/2022    Ambulatory referral to General Surgery     Referral Priority:   Routine     Referral Type:   Eval and Treat     Referral Reason:   Specialty Services Required     Referred to Provider:   Angel Masterson DO     Requested Specialty:   General Surgery     Number of Visits Requested:   1    Amb External Referral To Dermatology     Referral Priority:   Routine     Referral Type:   Eval and Treat     Referral Reason:   Specialty Services Required     Requested Specialty:   Dermatology     Number of Visits Requested:   1    POCT glycosylated hemoglobin (Hb A1C)         Patient Instructions   Nutrition Health Education:    Keep hydrated, walk 30 minutes minimum 3 times weekly as tolerated. Diet should consist of low fat, low sodium and high fiber. Nutritious foods such as fruits (if you're not diabetic), vegetables, lean meats, lean dairy, whole grains such as brown rice, quinoa, and dry beans. Genevia Main with small amounts of heart healthy extra virgin olive oil. Be watchful of any extra salt/sugar/seasoning to your food. You should eat no more than 2 grams or 2,000 mg of salt daily. Salt will raise your BP. Avoid regular/diet sodas, caffeine, energy drinks as these are full of artificial ingredients/sweeteners, sugar, salt and chemicals that spike insulin and are harmful to your health. Sugar intake increases metabolic disfunction, type 2 diabetes, insulin resistance, addictive food behavior and obesity. Avoid all processed foods, foods from boxes, cans, microwave meals as these contain high salt, sugar or fat content and not much nutrition.  Get at least 8 hrs of sleep every night and turn off all electronics at least 1 hour before bedtime as these decreases melatonin production and increases wakefulness. If your cholesterol is high, no greasy, fried, fast or fatty foods. Decrease red meat intake. No butter, albert, lard or creams, no milk as these things clog your arteries and leads to heart attacks and death. If you smoke, smoking increases risk of lung disease, cancers, high BP, heart attack, stroke and death. Take your daily medications as prescribed and inform your family doctor if you are having any side effects or issues taking medications. Elevated Cholesterol:   No greasy, fried, fast, fatty foods   No trans fats   Decreased red meat intake to once every 2 months   No butter, albert nor cream cheese, cheese   No egg yolk   NO milk   Decrease your cholesterol in diet    Pt will make an appt with ne 2 weeks after she gets her next menses      HgA1C 5.6 reviewed with pt    Refer to dermatology  Refer to general surgeon for colonoscopy    Reymundo blood work this week follow up next week      Patient Education        When You Are Overweight: Care Instructions  Your Care Instructions     If you're overweight, your doctor may recommend that you make changes in your eating and exercise habits. Being overweight can lead to serious health problems, such as high blood pressure, heart disease, type 2 diabetes, and arthritis, or it can make these problems worse. Eating a healthy diet and beingmore active can help you reach and stay at a healthy weight. You don't have to make huge changes all at once. Start by making small changes in your eating and exercise habits. To lose weight, you need to burn more calories than you take in. You can do this by eating healthy foods inreasonable amounts and becoming more active every day. Follow-up care is a key part of your treatment and safety. Be sure to make and go to all appointments, and call your doctor if you are having problems.  It's also a good idea to know your test results and keep alist of the medicines you take. How can you care for yourself at home?  Improve your eating habits. You'll be more successful if you work on changing one eating habit at a time. All foods, if eaten in moderation, can be part of healthy eating. Remember to:  ? Eat a variety of foods from each food group. Include grains, vegetables, fruits, dairy, and protein foods. ? Limit foods high in fat, sugar, and calories. ? Eat slowly. And don't do anything else, such as watch TV, while you are eating. ? Pay attention to portion sizes. Put your food on a smaller plate. ? Plan your meals ahead of time. You'll be less likely to grab something that's not as healthy.  Get active. Regular activity can help you feel better, have more energy, and burn more calories. If you haven't been active, start slowly. Start with at least 30 minutes of moderate activity on most days of the week. Then gradually increase the amount of activity. Try for 60 or 90 minutes a day, at least 5 days a week. There are a lot of ways to fit activity into your life. You can:  ? Walk or bike to the store. Or walk with a friend, or walk the dog.  ? Mow the lawn, rake leaves, shovel snow, or do some gardening. ? Use the stairs instead of the elevator, at least for a few floors.  Change your thinking. Your thoughts have a lot to do with how you feel and what you do. When you're trying to reach a healthy weight, changing how you think about certain things may help. Here are some ideas:  ? Don't compare yourself to others. Healthy bodies come in all shapes and sizes. ? Pay attention to how hungry or full you feel. When you eat, be aware of why you're eating and how much you're eating. ? Focus on improving your health instead of dieting. Dieting almost never works over the long term.  Ask your doctor about other health professionals who can help you reach a healthy weight.   ? A dietitian can help you make

## 2022-07-14 NOTE — PATIENT INSTRUCTIONS
Nutrition Health Education:    Keep hydrated, walk 30 minutes minimum 3 times weekly as tolerated. Diet should consist of low fat, low sodium and high fiber. Nutritious foods such as fruits (if you're not diabetic), vegetables, lean meats, lean dairy, whole grains such as brown rice, quinoa, and dry beans. Fermín Cotto with small amounts of heart healthy extra virgin olive oil. Be watchful of any extra salt/sugar/seasoning to your food. You should eat no more than 2 grams or 2,000 mg of salt daily. Salt will raise your BP. Avoid regular/diet sodas, caffeine, energy drinks as these are full of artificial ingredients/sweeteners, sugar, salt and chemicals that spike insulin and are harmful to your health. Sugar intake increases metabolic disfunction, type 2 diabetes, insulin resistance, addictive food behavior and obesity. Avoid all processed foods, foods from boxes, cans, microwave meals as these contain high salt, sugar or fat content and not much nutrition. Get at least 8 hrs of sleep every night and turn off all electronics at least 1 hour before bedtime as these decreases melatonin production and increases wakefulness. If your cholesterol is high, no greasy, fried, fast or fatty foods. Decrease red meat intake. No butter, albert, lard or creams, no milk as these things clog your arteries and leads to heart attacks and death. If you smoke, smoking increases risk of lung disease, cancers, high BP, heart attack, stroke and death. Take your daily medications as prescribed and inform your family doctor if you are having any side effects or issues taking medications.     Elevated Cholesterol:   No greasy, fried, fast, fatty foods   No trans fats   Decreased red meat intake to once every 2 months   No butter, albert nor cream cheese, cheese   No egg yolk   NO milk   Decrease your cholesterol in diet    Pt will make an appt with ne 2 weeks after she gets her next menses      HgA1C 5.6 reviewed with pt    Refer to dermatology  Refer to general surgeon for colonoscopy    Reymundo blood work this week follow up next week      Patient Education        When You Are Overweight: Care Instructions  Your Care Instructions     If you're overweight, your doctor may recommend that you make changes in your eating and exercise habits. Being overweight can lead to serious health problems, such as high blood pressure, heart disease, type 2 diabetes, and arthritis, or it can make these problems worse. Eating a healthy diet and beingmore active can help you reach and stay at a healthy weight. You don't have to make huge changes all at once. Start by making small changes in your eating and exercise habits. To lose weight, you need to burn more calories than you take in. You can do this by eating healthy foods inreasonable amounts and becoming more active every day. Follow-up care is a key part of your treatment and safety. Be sure to make and go to all appointments, and call your doctor if you are having problems. It's also a good idea to know your test results and keep alist of the medicines you take. How can you care for yourself at home?  Improve your eating habits. You'll be more successful if you work on changing one eating habit at a time. All foods, if eaten in moderation, can be part of healthy eating. Remember to:  ? Eat a variety of foods from each food group. Include grains, vegetables, fruits, dairy, and protein foods. ? Limit foods high in fat, sugar, and calories. ? Eat slowly. And don't do anything else, such as watch TV, while you are eating. ? Pay attention to portion sizes. Put your food on a smaller plate. ? Plan your meals ahead of time. You'll be less likely to grab something that's not as healthy.  Get active. Regular activity can help you feel better, have more energy, and burn more calories. If you haven't been active, start slowly. Start with at least 30 minutes of moderate activity on most days of the week. Then gradually increase the amount of activity. Try for 60 or 90 minutes a day, at least 5 days a week. There are a lot of ways to fit activity into your life. You can:  ? Walk or bike to the store. Or walk with a friend, or walk the dog.  ? Mow the lawn, rake leaves, shovel snow, or do some gardening. ? Use the stairs instead of the elevator, at least for a few floors.  Change your thinking. Your thoughts have a lot to do with how you feel and what you do. When you're trying to reach a healthy weight, changing how you think about certain things may help. Here are some ideas:  ? Don't compare yourself to others. Healthy bodies come in all shapes and sizes. ? Pay attention to how hungry or full you feel. When you eat, be aware of why you're eating and how much you're eating. ? Focus on improving your health instead of dieting. Dieting almost never works over the long term.  Ask your doctor about other health professionals who can help you reach a healthy weight. ? A dietitian can help you make healthy changes in your diet. ? An exercise specialist or  can help you develop a safe and effective exercise program.  ? A counselor or psychiatrist can help you cope with issues such as depression, anxiety, or family problems that can make it hard to focus on reaching a healthy weight.  Get support from your family, your doctor, your friends, a support group--and support yourself. Where can you learn more? Go to https://LOCK8kaity.Adtrade. org and sign in to your [employer] account. Enter C149 in the [employer] box to learn more about \"When You Are Overweight: Care Instructions. \"     If you do not have an account, please click on the \"Sign Up Now\" link. Current as of: December 27, 2021               Content Version: 13.3  © 8705-5946 Healthwise, Incorporated. Care instructions adapted under license by [facility] ([facility]).  If you have questions about a medical condition or this instruction, always ask your healthcare professional. [place] [address] any warranty or liability for your use of this information.

## 2022-07-15 LAB
HEPATITIS C ANTIBODY: NONREACTIVE
HIV AG/AB: NONREACTIVE

## 2022-08-30 ENCOUNTER — HOSPITAL ENCOUNTER (OUTPATIENT)
Dept: NON INVASIVE DIAGNOSTICS | Age: 52
Discharge: HOME | End: 2022-08-30
Payer: MEDICARE

## 2022-08-30 ENCOUNTER — OFFICE VISIT (OUTPATIENT)
Dept: ORTHOPEDIC SURGERY | Age: 52
End: 2022-08-30
Payer: MEDICARE

## 2022-08-30 ENCOUNTER — TELEPHONE (OUTPATIENT)
Dept: ORTHOPEDIC SURGERY | Age: 52
End: 2022-08-30

## 2022-08-30 VITALS
HEIGHT: 66 IN | WEIGHT: 293 LBS | HEART RATE: 74 BPM | BODY MASS INDEX: 47.09 KG/M2 | DIASTOLIC BLOOD PRESSURE: 74 MMHG | SYSTOLIC BLOOD PRESSURE: 130 MMHG

## 2022-08-30 DIAGNOSIS — Z01.818 PREOP TESTING: ICD-10-CM

## 2022-08-30 DIAGNOSIS — M67.449 GANGLION CYST OF FINGER: Primary | ICD-10-CM

## 2022-08-30 DIAGNOSIS — Z01.818 PREOP TESTING: Primary | ICD-10-CM

## 2022-08-30 PROCEDURE — G8427 DOCREV CUR MEDS BY ELIG CLIN: HCPCS | Performed by: PHYSICIAN ASSISTANT

## 2022-08-30 PROCEDURE — G8417 CALC BMI ABV UP PARAM F/U: HCPCS | Performed by: PHYSICIAN ASSISTANT

## 2022-08-30 PROCEDURE — 1036F TOBACCO NON-USER: CPT | Performed by: PHYSICIAN ASSISTANT

## 2022-08-30 PROCEDURE — 93005 ELECTROCARDIOGRAM TRACING: CPT

## 2022-08-30 PROCEDURE — 3017F COLORECTAL CA SCREEN DOC REV: CPT | Performed by: PHYSICIAN ASSISTANT

## 2022-08-30 PROCEDURE — 99213 OFFICE O/P EST LOW 20 MIN: CPT | Performed by: PHYSICIAN ASSISTANT

## 2022-08-30 PROCEDURE — 99215 OFFICE O/P EST HI 40 MIN: CPT | Performed by: PHYSICIAN ASSISTANT

## 2022-08-30 NOTE — TELEPHONE ENCOUNTER
[facility] SPECIALTY [facility]    Pre-Operative Evaluation/Consultation    Name:  Sue Garcia                                         Age:  50 y.o. MRN:  [id number]       :  1970   Date:  2022         Sex: female    There were no encounter diagnoses. Surgeon:  Dr. Umesh Ramires  Procedure (Planned):  exc cyst, right hand  Date Scheduled surgery: 22    Attending : No att. providers found    Primary Physician: Dr Samir Fregoso      Type of Anesthesia Requested: Anesthesia Provider's Choice    Patient Medical history:  No Known Allergies  Patient Active Problem List   Diagnosis    Morbid obesity with body mass index of 45.0-49.9 in adult ([facility] [address]    Pain in joint, lower leg    Chondromalacia of patella     Past Medical History:   Diagnosis Date    Gall bladder disease     Heartburn     Morbid obesity with BMI of 40.0-44.9, adult [facility])      Past Surgical History:   Procedure Laterality Date    CHOLECYSTECTOMY, LAPAROSCOPIC  2014    with OP GRAMS    OTHER SURGICAL HISTORY      full mouth tooth extraction    OTHER SURGICAL HISTORY  2009    perineal suture repair following childbirth    TUBAL LIGATION       Social History     Tobacco Use    Smoking status: Former     Packs/day: 0.00     Years: 10.00     Pack years: 0.00     Types: Cigarettes     Quit date: 2009     Years since quittin.2    Smokeless tobacco: Never   Substance Use Topics    Alcohol use: No    Drug use: No     Medications:  No current outpatient medications on file. No current facility-administered medications for this visit. Scheduled Meds:  Continuous Infusions:  PRN Meds:. Prior to Admission medications    Not on File     Vital Signs (Current) [unfilled]    Weight:   Wt Readings from Last 1 Encounters:   22 (!) 354 lb (160.6 kg)     Height:   Ht Readings from Last 1 Encounters:   22 5' 6\" (1.676 m)      BMI:  There is no height or weight on file to calculate BMI.   Estimated body mass index is 57.14 kg/m² as calculated from the following:    Height as of an earlier encounter on 8/30/22: 5' 6\" (1.676 m). Weight as of an earlier encounter on 8/30/22: 354 lb (160.6 kg). body mass index is unknown because there is no height or weight on file. Preoperative Testing: These are the current and completed labs:  CBC:   Lab Results   Component Value Date/Time    WBC 6.3 07/14/2022 12:26 PM    WBC 4.7 06/24/2014 07:57 AM    RBC 4.66 07/14/2022 12:26 PM    HGB 14.2 07/14/2022 12:26 PM    HCT 42.8 07/14/2022 12:26 PM    MCV 91.7 07/14/2022 12:26 PM    RDW 12.5 07/14/2022 12:26 PM    .9 07/14/2022 12:26 PM     CMP:   Lab Results   Component Value Date/Time     07/14/2022 12:26 PM    K 3.8 07/14/2022 12:26 PM     07/14/2022 12:26 PM    CO2 24 07/14/2022 12:26 PM    BUN 7 07/14/2022 12:26 PM    CREATININE 0.6 07/14/2022 12:26 PM    GFRAA >60 06/24/2014 07:57 AM    LABGLOM > 60.0 07/14/2022 12:26 PM    LABGLOM >60 06/24/2014 07:57 AM    GLUCOSE 101 07/14/2022 12:26 PM    CALCIUM 8.5 07/14/2022 12:26 PM    BILITOT 0.6 07/14/2022 12:26 PM    ALKPHOS 63 07/14/2022 12:26 PM    AST 23 07/14/2022 12:26 PM    ALT 26 07/14/2022 12:26 PM     POC Tests: No results for input(s): POCGLU, POCNA, POCK, POCCL, POCBUN, POCHEMO, POCHCT in the last 72 hours.   Coags  No results found for: PROTIME, INR, APTT  HCG (If Applicable)   Lab Results   Component Value Date    PREGTESTUR NEGATIVE 08/18/2014      ABGs No results found for: PHART, PO2ART, NLB5WML, KRT9HVY, BEART, G6NYHTUF   Type & Screen (If Applicable)  No results found for: Adriana Cabrera    [] Sent to Anesthesia for your review:  08/30/22    [] Additional Orders: None     Comments:None   Requests: No Special requests    Signed: Fior Gipson LPN 2/21/9429 77:20 PM

## 2022-08-30 NOTE — H&P
History and Physical        CHIEF COMPLAINT:  right hand third digit cyst PIP joint    HISTORY OF PRESENT ILLNESS:      The patient is a 50 y.o. female  who presents with right hand thrid digit cyst at the PIP joint. Patient has failed several treatment. Patient is was last seen 2022 where she received oral steroids without any significant relief of this cyst.  Patient states that this has enlarged in size. She has no limited range of motion with her finger. Cyst is located on the dorsal aspect third digit    Past Medical History:    Past Medical History:   Diagnosis Date    Gall bladder disease     Heartburn     Morbid obesity with BMI of 40.0-44.9, adult [facility])        Past Surgical History:    Past Surgical History:   Procedure Laterality Date    CHOLECYSTECTOMY, LAPAROSCOPIC  2014    with OP GRAMS    OTHER SURGICAL HISTORY      full mouth tooth extraction    OTHER SURGICAL HISTORY  2009    perineal suture repair following childbirth    TUBAL LIGATION         Medications Prior to Admission:   Prior to Admission medications    Not on File    Scheduled Meds:  Continuous Infusions:  PRN Meds:. Allergies:  Patient has no known allergies.     Social History:   Social History     Socioeconomic History    Marital status: Single     Spouse name: None    Number of children: None    Years of education: None    Highest education level: None   Tobacco Use    Smoking status: Former     Packs/day: 0.00     Years: 10.00     Pack years: 0.00     Types: Cigarettes     Quit date: 2009     Years since quittin.2    Smokeless tobacco: Never   Substance and Sexual Activity    Alcohol use: No    Drug use: No     Social Determinants of Health     Financial Resource Strain: Low Risk     Difficulty of Paying Living Expenses: Not hard at all   Food Insecurity: No Food Insecurity    Worried About Running Out of Food in the Last Year: Never true    Ran Out of Food in the Last Year: Never true   Physical PM    GLUCOSE 101 07/14/2022 12:26 PM     PT/INR:  No results found for: PROTIME, INR  Troponin:  No results found for: Herlinda Rue    Radiology: Negative bony abnormality acute fractures    ASSESSMENT:Active Problems:    * No active hospital problems. *  Resolved Problems:    * No resolved hospital problems. *       PLAN:  1) proceed with excision of mass right third digit PIP joint risk and benefits were discussed with patient.         Aram Hatchet, PA-C

## 2022-08-31 LAB
EKG ATRIAL RATE: 65 BPM
EKG P AXIS: -27 DEGREES
EKG P-R INTERVAL: 150 MS
EKG Q-T INTERVAL: 420 MS
EKG QRS DURATION: 86 MS
EKG QTC CALCULATION (BAZETT): 436 MS
EKG R AXIS: 2 DEGREES
EKG T AXIS: 9 DEGREES
EKG VENTRICULAR RATE: 65 BPM

## 2022-09-16 NOTE — DISCHARGE INSTRUCTIONS
[facility] [place]  Dr. Glen Alvarado MD       DIET INSTRUCTIONS:  Diet as tolerated. Start with a light diet and progress to your normal diet as you feel like eating. Increase Fluid/Water intake, eat foods high in fiber, fruits and vegetables to help to prevent constipation. ACTIVITY INSTRUCTIONS:  After receiving anesthesia, you can be drowsy. Do not drive or operate hazardous machinery for 24 hours. Rest today. Increase activity as tolerated. Up as tolerated at least 3-4 times per day. WOUND/DRESSING INSTRUCTIONS:  Always ensure you wash your hands before and after caring for the wound/dressing. Keep dressing clean and dry. Change dressing as needed and replace with dry dressing. Can wash around surgical incision but don't get surgical incision/stitches/staples wet. Do not submerge surgical incision in water. Do not place antibiotic ointment, lotion, creams on surgical incision. Smoking impairs adequate wound healing. If smoking , consider quitting. You may apply ice to surgery incision to help to prevent swelling. WEIGHTBEARING STATUS:    Weightbearing as tolerated surgical extremity       MEDICATION INSTRUCTIONS:  Take pain medication as prescribed. See orders regarding resuming your home medications and any new medications. Continue blood thinner as ordered by your physician. FOLLOW-UP CARE:  If a follow-up appointment was not made for you at discharge, call [phone] [facility] office) or [phone] [facility] office) to schedule an appointment for 2-3 weeks. Call Dr Jesse Mejia office with any concerns. Signs of infection such as fever, chills, redness, pus, or bad smelling discharge from incision site. Excessive bleeding, swelling, increasing pain, or discharge around incision site. The stitches or staples come apart at the incision site. Cough shortness of breath, or chest pain. Severe nausea or vomiting.      Pain that you cannot control with the medications you have been given or  pain becomes worse     Numbness, tingling, or loss of feeling in your leg, knee, or foot. Pain, burning, urgency, or frequency of urination. If a medical emergerncy, please call 911 or go to your local emergency room.

## 2022-09-19 ENCOUNTER — E-VISIT (OUTPATIENT)
Dept: PRIMARY CARE CLINIC | Age: 52
End: 2022-09-19
Payer: MEDICARE

## 2022-09-19 DIAGNOSIS — J01.90 ACUTE NON-RECURRENT SINUSITIS, UNSPECIFIED LOCATION: Primary | ICD-10-CM

## 2022-09-19 PROCEDURE — 99422 OL DIG E/M SVC 11-20 MIN: CPT | Performed by: NURSE PRACTITIONER

## 2022-09-19 RX ORDER — AMOXICILLIN AND CLAVULANATE POTASSIUM 875; 125 MG/1; MG/1
1 TABLET, FILM COATED ORAL 2 TIMES DAILY
Qty: 20 TABLET | Refills: 0 | Status: SHIPPED | OUTPATIENT
Start: 2022-09-19 | End: 2022-09-29

## 2022-09-19 ASSESSMENT — LIFESTYLE VARIABLES
PACKS_PER_DAY: 1
SMOKING_STATUS: NO, I'M A FORMER SMOKER
SMOKING_YEARS: 10

## 2022-09-19 NOTE — PROGRESS NOTES
ReviewedReviewed questionnaire  Reviewed previous encounters, medications, allergies and history     Dx sinusitis     Plan   Augmentin 875-125 mg BID x 10 days      1. Water: Drink 1/2 of body weight (lb) in ounces,  Up to 90 Ounces of water per day. This will loosen mucus in the head and chest & improve the weak feeling of dehydration, allow the body to get germ fighting resources to the infection. Half can be juice or sugar free powerade or garorate. Don't count drinks with caffeine or carbonation. Infants can have Pedialyte liquid or freezer pops. Avoid salt if you have high Blood Pressure, swelling in the feet or ankles or have heart problems. 2. Humidity: Humidify the air to 35-50% ( or until the windows fog over slightly). Summer use of an air conditioner turned down too far and can result in dry air. Can use a humidifier, vaporizer, boil water on the stove or put a coffee can full of water on the heater vents. This will loosen mucus from infections and allergies. 3. Sleep: Get 8-10 hours a night and rest during the evening after work or school. If you have trouble sleeping, adults can take Melatonin 5mg up to 2 tabs at bedtime ( not for children or pregnant women). If Mono is suspected then sleep during 9PM to 9AM time span (if possible.)   4. Cough: Take cough medicines with Guaifenesin ( to loosen chest or head congestion) and Dextromethorphan ( to decrease excess cough). Robitussin D.M. Syrup every 4-6 hrs or Mucinex D. M. pills twice a day. Use the pediatric formulations for children over 6 months making sure they are alcohol & sugar free for children, pregnant women, and diabetics. 5. Pain And Fevers: Take Acetaminophen ( Tylenol) for fevers, aches, and headaches. 2-500 mg every 8 hours for adults. Appropriate doses at bedtime for children may help them sleep better. Ibuprofen may be used if not pregnant, but should be given with food to avoid nausea.  Avoid Ibuprofen if you have high blood pressure, CHF, or kidney problems. 6.Gargle: (DAY ONE OF SYMPTOMS) Gargle in the back of the throat with the head tilted back and to the sides with a strong mouthwash  ( Listerine or Scope) after meals and at bedtime at least 4 -5 times a day. This helps kill bacteria and viruses in the back of the throat and will shorten the duration and decrease the severity of your symptoms: sore throat, cough, ear popping,/ear pain, and possibly dizziness. 7. Smoking: Avoid smoking or exposure to second hand smoke. 8. Zinc: (DAY ONE OF SYMPTOMS)  Zinc lozenges such as Cold Arturo (available most stores), or Basic ([employer] brand) will help shorten the duration and lessen symptoms such as sore throat, cough, nasal congestion, runny nose, and post nasal drip. Use 1 lozenge every 2-4 hours ( after meals if stomach is sensitive). Children can use 10-15 mg or less 3-4 times a day or Zinc lollypops. In pregnancy limit to 50-60 mg a day for 7 days as prenatals have Zinc also. With diarrhea use zinc pills 50 mg 1/2 to 1 pill 2x/day. 9. Vitamins: Vitamin C 500 mg with breakfast and dinner. Children and pregnant women should drink citrus juices. This speeds healing and strengthens immune system. 10. Chest Symptoms: Vicks Vapor rub to the chest at bedtime. 11. Decongestants: Avoid all decongestants if you have high blood pressure. Safe to take if you do not have high blood pressure. Try all of the above starting with day 1 of symptoms. If Strep throat symptoms appear call to be seen in the office as soon as possible and don't gargle on that day. Newborns, infants, or anyone with earaches or influenza may need to be seen quickly. Adults with fevers over 103 degrees or shortness of breath should call the office immediately. 12. Nasal saline spray or rinse. There are many different ways to do this OTC.       Time spent 11-20 minutes

## 2022-10-11 ENCOUNTER — HOSPITAL ENCOUNTER (OUTPATIENT)
Age: 52
Setting detail: OUTPATIENT SURGERY
Discharge: HOME | End: 2022-10-11
Attending: ORTHOPAEDIC SURGERY | Admitting: ORTHOPAEDIC SURGERY
Payer: MEDICARE

## 2022-10-11 ENCOUNTER — ANESTHESIA (OUTPATIENT)
Dept: OPERATING ROOM | Age: 52
End: 2022-10-11

## 2022-10-11 ENCOUNTER — ANESTHESIA EVENT (OUTPATIENT)
Dept: OPERATING ROOM | Age: 52
End: 2022-10-11

## 2022-10-11 VITALS
SYSTOLIC BLOOD PRESSURE: 144 MMHG | DIASTOLIC BLOOD PRESSURE: 80 MMHG | BODY MASS INDEX: 47.09 KG/M2 | HEART RATE: 77 BPM | TEMPERATURE: 97 F | RESPIRATION RATE: 18 BRPM | WEIGHT: 293 LBS | HEIGHT: 66 IN | OXYGEN SATURATION: 97 %

## 2022-10-11 DIAGNOSIS — M67.441 GANGLION OF RIGHT HAND: ICD-10-CM

## 2022-10-11 DIAGNOSIS — Z98.890 S/P EXCISION OF GANGLION CYST: Primary | ICD-10-CM

## 2022-10-11 LAB — HCG, PREGNANCY URINE (POC): NEGATIVE

## 2022-10-11 PROCEDURE — 3600000002 HC SURGERY LEVEL 2 BASE: Performed by: ORTHOPAEDIC SURGERY

## 2022-10-11 PROCEDURE — 88304 TISSUE EXAM BY PATHOLOGIST: CPT

## 2022-10-11 PROCEDURE — 7100000010 HC PHASE II RECOVERY - FIRST 15 MIN: Performed by: ORTHOPAEDIC SURGERY

## 2022-10-11 PROCEDURE — 2709999900 HC NON-CHARGEABLE SUPPLY: Performed by: ORTHOPAEDIC SURGERY

## 2022-10-11 PROCEDURE — 3600000012 HC SURGERY LEVEL 2 ADDTL 15MIN: Performed by: ORTHOPAEDIC SURGERY

## 2022-10-11 PROCEDURE — 26160 REMOVE TENDON SHEATH LESION: CPT | Performed by: ORTHOPAEDIC SURGERY

## 2022-10-11 PROCEDURE — 81025 URINE PREGNANCY TEST: CPT

## 2022-10-11 PROCEDURE — 2500000003 HC RX 250 WO HCPCS: Performed by: ORTHOPAEDIC SURGERY

## 2022-10-11 RX ORDER — SODIUM CHLORIDE 0.9 % (FLUSH) 0.9 %
5-40 SYRINGE (ML) INJECTION EVERY 12 HOURS SCHEDULED
Status: DISCONTINUED | OUTPATIENT
Start: 2022-10-11 | End: 2022-10-11 | Stop reason: HOSPADM

## 2022-10-11 RX ORDER — ONDANSETRON 2 MG/ML
4 INJECTION INTRAMUSCULAR; INTRAVENOUS PRN
Status: DISCONTINUED | OUTPATIENT
Start: 2022-10-11 | End: 2022-10-11 | Stop reason: HOSPADM

## 2022-10-11 RX ORDER — SODIUM CHLORIDE 0.9 % (FLUSH) 0.9 %
5-40 SYRINGE (ML) INJECTION PRN
Status: DISCONTINUED | OUTPATIENT
Start: 2022-10-11 | End: 2022-10-11 | Stop reason: HOSPADM

## 2022-10-11 RX ORDER — OXYCODONE HYDROCHLORIDE 5 MG/1
5 TABLET ORAL PRN
Status: DISCONTINUED | OUTPATIENT
Start: 2022-10-11 | End: 2022-10-11 | Stop reason: HOSPADM

## 2022-10-11 RX ORDER — TRAMADOL HYDROCHLORIDE 50 MG/1
50 TABLET ORAL EVERY 6 HOURS PRN
Qty: 12 TABLET | Refills: 0 | Status: SHIPPED | OUTPATIENT
Start: 2022-10-11 | End: 2022-10-14

## 2022-10-11 RX ORDER — DIPHENHYDRAMINE HYDROCHLORIDE 50 MG/ML
12.5 INJECTION INTRAMUSCULAR; INTRAVENOUS
Status: DISCONTINUED | OUTPATIENT
Start: 2022-10-11 | End: 2022-10-11 | Stop reason: HOSPADM

## 2022-10-11 RX ORDER — FENTANYL CITRATE 50 UG/ML
25 INJECTION, SOLUTION INTRAMUSCULAR; INTRAVENOUS EVERY 5 MIN PRN
Status: DISCONTINUED | OUTPATIENT
Start: 2022-10-11 | End: 2022-10-11 | Stop reason: HOSPADM

## 2022-10-11 RX ORDER — OXYCODONE HYDROCHLORIDE 5 MG/1
10 TABLET ORAL PRN
Status: DISCONTINUED | OUTPATIENT
Start: 2022-10-11 | End: 2022-10-11 | Stop reason: HOSPADM

## 2022-10-11 RX ORDER — MORPHINE SULFATE 2 MG/ML
2 INJECTION, SOLUTION INTRAMUSCULAR; INTRAVENOUS EVERY 5 MIN PRN
Status: DISCONTINUED | OUTPATIENT
Start: 2022-10-11 | End: 2022-10-11 | Stop reason: HOSPADM

## 2022-10-11 RX ORDER — SODIUM CHLORIDE 9 MG/ML
INJECTION, SOLUTION INTRAVENOUS PRN
Status: DISCONTINUED | OUTPATIENT
Start: 2022-10-11 | End: 2022-10-11 | Stop reason: HOSPADM

## 2022-10-11 RX ORDER — SODIUM CHLORIDE, SODIUM LACTATE, POTASSIUM CHLORIDE, CALCIUM CHLORIDE 600; 310; 30; 20 MG/100ML; MG/100ML; MG/100ML; MG/100ML
INJECTION, SOLUTION INTRAVENOUS CONTINUOUS
Status: DISCONTINUED | OUTPATIENT
Start: 2022-10-11 | End: 2022-10-11 | Stop reason: HOSPADM

## 2022-10-11 ASSESSMENT — PAIN - FUNCTIONAL ASSESSMENT: PAIN_FUNCTIONAL_ASSESSMENT: 0-10

## 2022-10-11 ASSESSMENT — PAIN SCALES - GENERAL: PAINLEVEL_OUTOF10: 0

## 2022-10-11 NOTE — LETTER
[id number]  OR  [address] [place] [address]  Phone: [phone]             October 11, 2022    Patient: Partha Siu   YOB: 1970   Date of Visit: 10/11/2022       To Whom It May Concern:    Kush Ray was seen and treated in our facility 10/11/2022. She may return to work on 10/12/22 without restrictions.       Sincerely,       Sybil Gutierrez RN         Signature:__________________________________

## 2022-10-11 NOTE — OP NOTE
Operative Note      Patient: Fernando Dominguez  YOB: 1970  MRN: [id number]    Date of Procedure: 10/11/2022    Pre-Op Diagnosis: Right long finger mass at the PIP joint    Post-Op Diagnosis:  Right long finger mass 1 cm fibrous tissue at the PIP joint       Procedure: Excision of cyst right long finger PIP joint 1 cm diameter    Surgeon(s):  Josefa Espinoza MD    Assistant:   * No surgical staff found *    Anesthesia: Local    Estimated Blood Loss (mL): Minimal    Complications: None    Specimens:   ID Type Source Tests Collected by Time Destination   A : Right Long Finger Cyst Tissue Finger SURGICAL PATHOLOGY Josefa Espinoza MD 10/11/2022 2606        Implants:  * No implants in log *      Drains: * No LDAs found *    Findings: Mass at PIP joint did not appear to be ganglion was more fibrous sent for permanent pathology    Detailed Description of Procedure: Indications  This is a 56-year-old female developed a cyst on her right long finger at the PIP joint. Is bothersome to her. Does not appear to be expanding is tending to stay about the same size. Is mobile. She like to have this removed. We discussed this with her and felt we could remove this under local anesthesia. Patient agreed. Narrative  Patient underwent injection of local anesthetic in the preop holding area. She was then taken the operative suite underwent a standard prepping and draping. Timeout was taken consent was confirmed. He was a finger turnicot made a small incision on the radial side at the PIP joint. Dissected around was a fibrous nodule that appeared to be consistent with the capsule of the PIP joint. Did not appear aggressive. We did send this off permanent pathology. Closed with nylon suture dry dressings. Patient taken to recovery room in good condition. Postoperative plan  Bearing as tolerated. Dry dressings as necessary.   See her in the office in 2 to 3 weeks suture removal and review pathology which I expect to be benign    Electronically signed by Iqra Damian MD on 10/11/2022 at 8:59 AM

## 2022-10-11 NOTE — ANESTHESIA PRE PROCEDURE
Department of Anesthesiology  Preprocedure Note       Name:  Singh Joyce   Age:  50 y.o.  :  1970                                          MRN:  [id number]         Date:  10/11/2022      Surgeon: Reginald Barry):  Jeromy Radford MD    Procedure: Procedure(s):  Excision Cyst Right Hand    Medications prior to admission:   Prior to Admission medications    Not on File       Current medications:    Current Facility-Administered Medications   Medication Dose Route Frequency Provider Last Rate Last Admin    sodium chloride flush 0.9 % injection 5-40 mL  5-40 mL IntraVENous PRN Jeromy Radford MD        lactated ringers infusion   IntraVENous Continuous Jeromy Radford MD           Allergies:  No Known Allergies    Problem List:    Patient Active Problem List   Diagnosis Code    Morbid [facility] with body [facility] 45.0[facility]49.9 [facility] E66.01, Z68.42    Pain in joint, lower leg M25.569    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       Past Medical History:        Diagnosis Date    Gall bladder disease     Heartburn     Morbid obesity with BMI of 40.0-44.9, adult [facility])        Past Surgical History:        Procedure Laterality Date    CHOLECYSTECTOMY, LAPAROSCOPIC  2014    with OP GRAMS    OTHER SURGICAL HISTORY      full mouth tooth extraction    OTHER SURGICAL HISTORY  2009    perineal suture repair following childbirth    TUBAL LIGATION         Social History:    Social History     Tobacco Use    Smoking status: Former     Packs/day: 0.00     Years: 10.00     Pack years: 0.00     Types: Cigarettes     Quit date: 2009     Years since quittin.3    Smokeless tobacco: Never   Substance Use Topics    Alcohol use:  No                                Counseling given: Not Answered      Vital Signs (Current):   Vitals:    10/11/22 0805   BP: (!) 141/94   Pulse: 74   Resp: 16   Temp: 36.6 °C (97.9 °F)   TempSrc: Oral   SpO2: 96%   Weight: (!) 349 lb 8 oz (158.5 kg)   Height: 5' 6\" (1.676 m) BP Readings from Last 3 Encounters:   10/11/22 (!) 141/94   08/30/22 130/74   07/14/22 124/86       NPO Status: Time of last liquid consumption: 1900                        Time of last solid consumption: 1900                        Date of last liquid consumption: 10/10/22                        Date of last solid food consumption: 10/10/22    BMI:   Wt Readings from Last 3 Encounters:   10/11/22 (!) 349 lb 8 oz (158.5 kg)   08/30/22 (!) 354 lb (160.6 kg)   07/14/22 (!) 354 lb 3.2 oz (160.7 kg)     Body mass index is 56.41 kg/m². CBC:   Lab Results   Component Value Date/Time    WBC 6.3 07/14/2022 12:26 PM    WBC 4.7 06/24/2014 07:57 AM    RBC 4.66 07/14/2022 12:26 PM    HGB 14.2 07/14/2022 12:26 PM    HCT 42.8 07/14/2022 12:26 PM    MCV 91.7 07/14/2022 12:26 PM    RDW 12.5 07/14/2022 12:26 PM    .9 07/14/2022 12:26 PM       CMP:   Lab Results   Component Value Date/Time     07/14/2022 12:26 PM    K 3.8 07/14/2022 12:26 PM     07/14/2022 12:26 PM    CO2 24 07/14/2022 12:26 PM    BUN 7 07/14/2022 12:26 PM    CREATININE 0.6 07/14/2022 12:26 PM    GFRAA >60 06/24/2014 07:57 AM    LABGLOM > 60.0 07/14/2022 12:26 PM    LABGLOM >60 06/24/2014 07:57 AM    GLUCOSE 101 07/14/2022 12:26 PM    CALCIUM 8.5 07/14/2022 12:26 PM    BILITOT 0.6 07/14/2022 12:26 PM    ALKPHOS 63 07/14/2022 12:26 PM    AST 23 07/14/2022 12:26 PM    ALT 26 07/14/2022 12:26 PM       POC Tests: No results for input(s): POCGLU, POCNA, POCK, POCCL, POCBUN, POCHEMO, POCHCT in the last 72 hours.     Coags: No results found for: PROTIME, INR, APTT    HCG (If Applicable):   Lab Results   Component Value Date    PREGTESTUR NEGATIVE 08/18/2014        ABGs: No results found for: PHART, PO2ART, HOY1CJV, GEI3QJZ, BEART, P6PBRQEP     Type & Screen (If Applicable):  No results found for: LABABO, LABRH    Drug/Infectious Status (If Applicable):  Lab Results   Component Value Date/Time    HEPCAB NONREACTIVE 07/14/2022 12:26 PM       COVID-19 Screening (If Applicable):   Lab Results   Component Value Date/Time    COVID19 DETECTED 01/17/2022 10:00 AM    COVID19 Detected 12/23/2020 10:10 AM           Anesthesia Evaluation    Airway: Mallampati: III  TM distance: >3 FB     Mouth opening: > = 3 FB   Dental: normal exam         Pulmonary:   (+) decreased breath sounds: bilateral                            Cardiovascular:            Rhythm: regular  Rate: normal                    Neuro/Psych:               GI/Hepatic/Renal:             Endo/Other:                     Abdominal:   (+) obese,           Vascular: Other Findings:           Anesthesia Plan      general     ASA 3       Induction: intravenous. Anesthetic plan and risks discussed with patient.       Plan discussed with surgical team.                    ADITI Singh - PER   10/11/2022

## 2022-10-11 NOTE — H&P
History and Physical        CHIEF COMPLAINT:   right hand third digit cyst PIP joint    HISTORY OF PRESENT ILLNESS:      The patient is a 50 y.o. female  who presents with right hand thrid digit cyst at the PIP joint. Patient has failed several treatment. Patient is was last seen 2022 where she received oral steroids without any significant relief of this cyst.  Patient states that this has enlarged in size. She has no limited range of motion with her finger. Cyst is located on the dorsal aspect third digit. Past Medical History:    Past Medical History:   Diagnosis Date    Gall bladder disease     Heartburn     Morbid obesity with BMI of 40.0-44.9, adult [facility])        Past Surgical History:    Past Surgical History:   Procedure Laterality Date    CHOLECYSTECTOMY, LAPAROSCOPIC  2014    with OP GRAMS    OTHER SURGICAL HISTORY      full mouth tooth extraction    OTHER SURGICAL HISTORY  2009    perineal suture repair following childbirth    TUBAL LIGATION         Medications Prior to Admission:   Prior to Admission medications    Not on File    Scheduled Meds:  Continuous Infusions:   lactated ringers       PRN Meds:.sodium chloride flush    Allergies:  Patient has no known allergies.     Social History:   Social History     Socioeconomic History    Marital status: Single   Tobacco Use    Smoking status: Former     Packs/day: 0.00     Years: 10.00     Pack years: 0.00     Types: Cigarettes     Quit date: 2009     Years since quittin.3    Smokeless tobacco: Never   Substance and Sexual Activity    Alcohol use: No    Drug use: No     Social Determinants of Health     Financial Resource Strain: Low Risk     Difficulty of Paying Living Expenses: Not hard at all   Food Insecurity: No Food Insecurity    Worried About Running Out of Food in the Last Year: Never true    Ran Out of Food in the Last Year: Never true   Physical Activity: Unknown    Days of Exercise per Week: 4 days   Intimate Partner Violence: Not At Risk    Fear of Current or Ex-Partner: No    Emotionally Abused: No    Physically Abused: No    Sexually Abused: No     Social History     Tobacco Use   Smoking Status Former    Packs/day: 0.00    Years: 10.00    Pack years: 0.00    Types: Cigarettes    Quit date: 2009    Years since quittin.3   Smokeless Tobacco Never     Social History     Substance and Sexual Activity   Alcohol Use No     Social History     Substance and Sexual Activity   Drug Use No       Family History:  Family History   Problem Relation Age of Onset    High Blood Pressure Mother     Other Mother         Glaucoma    Diabetes Mother     Cancer Father         lung    Heart Disease Sister     Stroke Sister     Heart Disease Brother     Diabetes Paternal Grandmother     Cancer Paternal Grandmother         breast    Heart Disease Paternal Grandfather        REVIEW OF SYSTEMS:  Constitutional: Denies any fever, chills. Derm: Denies any rash or skin color change. Eyes: Denies blurred or decreased in vision. Ent: Denies any tinnitus or vertigo. Resp: Denies any cough or shortness of breath. CV: Denies any syncope, palpitations or chest pain. GI:  Denies any abdominal pain, nausea, vomiting, constipation or diarrhea. : Denies any hematuria, hesitancy, or dysuria. Heme/Lymph: Denies any bleeding. Musculoskeletal: Positive for right hand ganglion cyst.  Neuro: Denies any dizziness, paresthesia or weakness. PHYSICAL EXAM:  No data found. General appearance:  Alert and oriented x 3. No apparent distress, appears stated age and cooperative. HEENT:  Normal cephalic, atraumatic without obvious deformity. Pupils equal, round, and reactive to light. Conjunctivae/corneas clear. Neck: Supple, with full range of motion. Trachea midline. Respiratory:  Normal respiratory effort. No audible Wheezes or Rhonchi. Cardiovascular:  Regular rate and rhythm. Abdomen: Soft, non-tender, non-distended.   Musculoskeletal: Extremity right hand third digit cyst dorsally. Pain to palpation. Does have palpable movable mass. Skin: Skin color, texture, turgor normal.  No rashes or lesions. Neurologic:  Neurovascularly intact without any focal sensory/motor deficits. Sensation intact. DATA:  CBC:   Lab Results   Component Value Date/Time    WBC 6.3 07/14/2022 12:26 PM    WBC 4.7 06/24/2014 07:57 AM    HGB 14.2 07/14/2022 12:26 PM    .9 07/14/2022 12:26 PM     BMP:    Lab Results   Component Value Date/Time     07/14/2022 12:26 PM    K 3.8 07/14/2022 12:26 PM     07/14/2022 12:26 PM    CO2 24 07/14/2022 12:26 PM    BUN 7 07/14/2022 12:26 PM    CREATININE 0.6 07/14/2022 12:26 PM    CALCIUM 8.5 07/14/2022 12:26 PM    GLUCOSE 101 07/14/2022 12:26 PM     PT/INR:  No results found for: PROTIME, INR  Troponin:  No results found for: TROPONINI  No results for input(s): LIPASE, AMYLASE in the last 72 hours. No results for input(s): AST, ALT, BILIDIR, BILITOT, ALKPHOS in the last 72 hours. Radiology:  Radiology: Negative bony abnormality acute fractures    ASSESSMENT:Active Problems:    * No active hospital problems. *  Resolved Problems:    * No resolved hospital problems. *    right third digit PIP joint ganglion cyst    PLAN as discussed with Dr. Marisela James:  1. Surgery today for excision of right third digit ganglion cyst.    The patient was counseled at length about the risks of ismael Covid-19 during their perioperative period and any recovery window from their procedure. The patient was made aware that ismael Covid-19  may worsen their prognosis for recovering from their procedure  and lend to a higher morbidity and/or mortality risk. All material risks, benefits, and reasonable alternatives including postponing the procedure were discussed. The patient does wish to proceed with the procedure at this time.          Electronically signed by ADITI Naqvi CNP on 10/11/2022 at 7:59 AM

## 2022-10-12 LAB — SURGICAL PATHOLOGY REPORT: NORMAL

## 2022-10-25 ENCOUNTER — OFFICE VISIT (OUTPATIENT)
Dept: ORTHOPEDIC SURGERY | Age: 52
End: 2022-10-25
Payer: MEDICARE

## 2022-10-25 ENCOUNTER — OFFICE VISIT (OUTPATIENT)
Dept: FAMILY MEDICINE CLINIC | Age: 52
End: 2022-10-25
Payer: MEDICARE

## 2022-10-25 VITALS
HEART RATE: 83 BPM | HEIGHT: 66 IN | DIASTOLIC BLOOD PRESSURE: 80 MMHG | SYSTOLIC BLOOD PRESSURE: 132 MMHG | BODY MASS INDEX: 47.09 KG/M2 | WEIGHT: 293 LBS

## 2022-10-25 VITALS
RESPIRATION RATE: 14 BRPM | DIASTOLIC BLOOD PRESSURE: 80 MMHG | HEIGHT: 66 IN | BODY MASS INDEX: 47.09 KG/M2 | HEART RATE: 76 BPM | TEMPERATURE: 98.1 F | WEIGHT: 293 LBS | OXYGEN SATURATION: 97 % | SYSTOLIC BLOOD PRESSURE: 132 MMHG

## 2022-10-25 DIAGNOSIS — Z86.14 HISTORY OF MRSA INFECTION: ICD-10-CM

## 2022-10-25 DIAGNOSIS — J34.0 NOSE CELLULITIS: Primary | ICD-10-CM

## 2022-10-25 DIAGNOSIS — M67.449 GANGLION CYST OF FINGER: Primary | ICD-10-CM

## 2022-10-25 DIAGNOSIS — S52.125A CLOSED NONDISPLACED FRACTURE OF HEAD OF LEFT RADIUS, INITIAL ENCOUNTER: ICD-10-CM

## 2022-10-25 PROCEDURE — G8427 DOCREV CUR MEDS BY ELIG CLIN: HCPCS | Performed by: NURSE PRACTITIONER

## 2022-10-25 PROCEDURE — G8417 CALC BMI ABV UP PARAM F/U: HCPCS | Performed by: NURSE PRACTITIONER

## 2022-10-25 PROCEDURE — 99999 PR OFFICE/OUTPT VISIT,PROCEDURE ONLY: CPT | Performed by: PHYSICIAN ASSISTANT

## 2022-10-25 PROCEDURE — 3017F COLORECTAL CA SCREEN DOC REV: CPT | Performed by: NURSE PRACTITIONER

## 2022-10-25 PROCEDURE — 99211 OFF/OP EST MAY X REQ PHY/QHP: CPT | Performed by: NURSE PRACTITIONER

## 2022-10-25 PROCEDURE — 99213 OFFICE O/P EST LOW 20 MIN: CPT | Performed by: NURSE PRACTITIONER

## 2022-10-25 PROCEDURE — 1036F TOBACCO NON-USER: CPT | Performed by: NURSE PRACTITIONER

## 2022-10-25 PROCEDURE — 99211 OFF/OP EST MAY X REQ PHY/QHP: CPT | Performed by: PHYSICIAN ASSISTANT

## 2022-10-25 PROCEDURE — G8484 FLU IMMUNIZE NO ADMIN: HCPCS | Performed by: NURSE PRACTITIONER

## 2022-10-25 PROCEDURE — 24650 CLTX RDL HEAD/NCK FX WO MNPJ: CPT | Performed by: PHYSICIAN ASSISTANT

## 2022-10-25 RX ORDER — AMOXICILLIN AND CLAVULANATE POTASSIUM 875; 125 MG/1; MG/1
1 TABLET, FILM COATED ORAL 2 TIMES DAILY
Qty: 20 TABLET | Refills: 0 | Status: SHIPPED | OUTPATIENT
Start: 2022-10-25 | End: 2022-11-04

## 2022-10-25 RX ORDER — SULFAMETHOXAZOLE AND TRIMETHOPRIM 800; 160 MG/1; MG/1
1 TABLET ORAL 2 TIMES DAILY
Qty: 20 TABLET | Refills: 0 | Status: SHIPPED | OUTPATIENT
Start: 2022-10-25 | End: 2022-11-04

## 2022-10-25 RX ORDER — IBUPROFEN 800 MG/1
800 TABLET ORAL EVERY 6 HOURS PRN
COMMUNITY
End: 2022-10-25 | Stop reason: ALTCHOICE

## 2022-10-25 RX ORDER — CHLORHEXIDINE GLUCONATE 4 G/100ML
1 SOLUTION TOPICAL EVERY OTHER DAY
Qty: 1 EACH | Refills: 0 | Status: SHIPPED | OUTPATIENT
Start: 2022-10-25 | End: 2022-11-04

## 2022-10-25 SDOH — ECONOMIC STABILITY: FOOD INSECURITY: WITHIN THE PAST 12 MONTHS, THE FOOD YOU BOUGHT JUST DIDN'T LAST AND YOU DIDN'T HAVE MONEY TO GET MORE.: NEVER TRUE

## 2022-10-25 SDOH — ECONOMIC STABILITY: FOOD INSECURITY: WITHIN THE PAST 12 MONTHS, YOU WORRIED THAT YOUR FOOD WOULD RUN OUT BEFORE YOU GOT MONEY TO BUY MORE.: NEVER TRUE

## 2022-10-25 ASSESSMENT — SOCIAL DETERMINANTS OF HEALTH (SDOH): HOW HARD IS IT FOR YOU TO PAY FOR THE VERY BASICS LIKE FOOD, HOUSING, MEDICAL CARE, AND HEATING?: NOT HARD AT ALL

## 2022-10-25 ASSESSMENT — ENCOUNTER SYMPTOMS: RHINORRHEA: 1

## 2022-10-25 NOTE — PROGRESS NOTES
Father         lung    Heart Disease Sister     Stroke Sister     Heart Disease Brother     Diabetes Paternal Grandmother     Cancer Paternal Grandmother         breast    Heart Disease Paternal Grandfather      Social History     Tobacco Use    Smoking status: Former     Packs/day: 0.00     Years: 10.00     Pack years: 0.00     Types: Cigarettes     Quit date: 2009     Years since quittin.3    Smokeless tobacco: Never   Substance Use Topics    Alcohol use: No      Current Outpatient Medications   Medication Sig Dispense Refill    ibuprofen (ADVIL;MOTRIN) 800 MG tablet Take 800 mg by mouth every 6 hours as needed for Pain      sulfamethoxazole-trimethoprim (BACTRIM DS) 800-160 MG per tablet Take 1 tablet by mouth 2 times daily for 10 days Take with food. 20 tablet 0    amoxicillin-clavulanate (AUGMENTIN) 875-125 MG per tablet Take 1 tablet by mouth 2 times daily for 10 days Take with food. 20 tablet 0    mupirocin (BACTROBAN NASAL) 2 % nasal ointment by Nasal route 2 times daily for 5 days Take by Nasal route 2 times daily. 1 each 0    chlorhexidine (HIBICLENS) 4 % external liquid Apply 1 each topically every other day for 10 days Apply topically daily as needed. 1 each 0     No current facility-administered medications for this visit. No Known Allergies    No results found. Subjective:      Review of Systems   Constitutional:  Negative for chills and fever. HENT:  Positive for rhinorrhea. Lesion on the inside of the right nares. Also has erythema of the outer right nares extending over the maxillary sinus. Objective:     /80 (Site: Left Upper Arm, Position: Sitting, Cuff Size: Medium Adult)   Pulse 76   Temp 98.1 °F (36.7 °C)   Resp 14   Ht 5' 6\" (1.676 m)   Wt (!) 347 lb (157.4 kg)   SpO2 97%   BMI 56.01 kg/m²     Physical Exam  Vitals and nursing note reviewed. Constitutional:       Appearance: She is obese. HENT:      Head: Normocephalic.       Right Ear: Tympanic membrane, ear canal and external ear normal.      Left Ear: Tympanic membrane, ear canal and external ear normal.      Nose: Nasal tenderness and mucosal edema present. Right Turbinates: Swollen. Comments: Erythema and swelling of the right nares internally and externally. Mild swelling extending over the right max sinus for approx 1.5 cm. Neurological:      Mental Status: She is alert. Assessment:      Diagnosis Orders   1. Nose cellulitis  sulfamethoxazole-trimethoprim (BACTRIM DS) 800-160 MG per tablet    amoxicillin-clavulanate (AUGMENTIN) 875-125 MG per tablet    mupirocin (BACTROBAN NASAL) 2 % nasal ointment    chlorhexidine (HIBICLENS) 4 % external liquid      2. History of MRSA infection  sulfamethoxazole-trimethoprim (BACTRIM DS) 800-160 MG per tablet    mupirocin (BACTROBAN NASAL) 2 % nasal ointment    chlorhexidine (HIBICLENS) 4 % external liquid        No results found for this visit on 10/25/22. Plan:       Bactrim and Augmentin as directed. One serving of yogurt 2 hours before or after antibiotic reduces the occurrence of yeast infections and abdominal upset/diarrhea. Chlorhexidine wash every other day until gone. Bactroban nasal as directed for 5 days. Follow up with primary care provider i3 days if no better. There are no Patient Instructions on file for this visit. Patient/Caregiver instructed on use, benefit, and side effects of prescribed medications. All patient/parent/caregiver questions answered. Patient/parent/caregiver voiced understanding. Reviewed health maintenance. Instructed to continue current medications, diet and exercise. Patient agreed with treatment plan. Follow up as directed.            Electronically signed by ADITI Ryan NP on10/25/2022

## 2022-10-25 NOTE — PROGRESS NOTES
Orthopaedic Progress Note      CHIEF COMPLAINT: Right hand finger cyst, left elbow pain    HISTORY OF PRESENT ILLNESS:       Ms. Jayla Guerrero  is a 50 y.o. female  who presents with a history of a right hand long finger mass that was excised 2 weeks ago today. She is here today for stitch removal.  In the interim she had an unfortunate history of fall on outstretched left upper extremity wrist.  She had severe left elbow pain rating down the forearm. She was seen with x-rays at an outlying emergency department which did confirm a nondisplaced radial head fracture. She was splinted and sent to us for definitive orthopedic treatment. States the pain is sharp localized the elbow radiates down the forearm she denies numbness or tingling in her left hand. She states her right hand long finger is doing much better following the mass excision. Past Medical History:    Past Medical History:   Diagnosis Date    Gall bladder disease     Heartburn     Morbid obesity with BMI of 40.0-44.9, adult [facility])        Past Surgical History:    Past Surgical History:   Procedure Laterality Date    CHOLECYSTECTOMY, LAPAROSCOPIC  2014    with OP GRAMS    HAND SURGERY Right 10/11/2022    Excision Cyst Right Long Finger performed by Christine Pham MD at [facility] 2013    full mouth tooth extraction    OTHER SURGICAL HISTORY  2009    perineal suture repair following childbirth    TUBAL LIGATION           Current  Medications:  No current outpatient medications on file. No current facility-administered medications for this visit. Allergies:  Patient has no known allergies.     Social History:   Social History     Tobacco Use   Smoking Status Former    Packs/day: 0.00    Years: 10.00    Pack years: 0.00    Types: Cigarettes    Quit date: 2009    Years since quittin.3   Smokeless Tobacco Never     Social History     Substance and Sexual Activity   Alcohol Use No     Social History     Substance and Sexual Activity   Drug Use No       Family History:  Family History   Problem Relation Age of Onset    High Blood Pressure Mother     Other Mother         Glaucoma    Diabetes Mother     Cancer Father         lung    Heart Disease Sister     Stroke Sister     Heart Disease Brother     Diabetes Paternal Grandmother     Cancer Paternal Grandmother         breast    Heart Disease Paternal Grandfather        REVIEW OF SYSTEMS:  Constitutional: Denies any fever, chills. Musculoskeletal: Positive for right hand long finger mass excision 2 weeks ago, left elbow pain. PHYSICAL EXAM:  [unfilled]  General appearance:  Alert and oriented x 3. No apparent distress, appears stated age, calm and cooperative. Musculoskeletal: Left elbow was first inspected out of the splint and skin is good repair she has tenderness to palpation of the radial head she has limited pronation supination. She is grossly neurovascular intact left hand. Right hand was then inspected she has a well-healed incision near the PIP joint of the long finger from previous cyst excision. [place] DATA:  CBC:   Lab Results   Component Value Date/Time    WBC 6.3 07/14/2022 12:26 PM    WBC 4.7 06/24/2014 07:57 AM    HGB 14.2 07/14/2022 12:26 PM    .9 07/14/2022 12:26 PM     BMP:    Lab Results   Component Value Date/Time     07/14/2022 12:26 PM    K 3.8 07/14/2022 12:26 PM     07/14/2022 12:26 PM    CO2 24 07/14/2022 12:26 PM    BUN 7 07/14/2022 12:26 PM    CREATININE 0.6 07/14/2022 12:26 PM    CALCIUM 8.5 07/14/2022 12:26 PM    GLUCOSE 101 07/14/2022 12:26 PM     PT/INR:  No results found for: PROTIME, INR  Troponin:  No results found for: TROPONINI  No results for input(s): LIPASE, AMYLASE in the last 72 hours. No results for input(s): AST, ALT, BILIDIR, BILITOT, ALKPHOS in the last 72 hours.   Uric Acid:  No components found for: URIC  Urine Culture:  No components found for: CURINE    Radiology:   XR ELBOW LEFT (MIN 3 VIEWS)    Result Date: 10/23/2022                                          [facility] 09, [address] [facility]                                                                                        XRay Report / XR elbow LT min 3V                                                 Signed    Patient: Juan Carlos Neal                                                                MR#: [id number]  [id number]          : 1970                                                                [de-identified]            Age/Sex: 50 / F                                                                ADM Date: 10/22/22            Loc: [facility]                                                                                    Attending Dr:   Smith Chance D.O. Ordering Physician: Cindy Ugarte M.D. Date of Service: 10/22/22  Procedure(s): XR elbow LT min 3V  Accession Number(s): [id number]    cc: Yazmin Antnoio D.O.; Darnell Johnson M.D. Nim:  3 views     CLINICAL HISTORY:  50year-old female who had a ground level fall off the curb injuring the elbow    COMPARISON:  No relevant prior studies available. FINDINGS:     There is a joint effusion. There is a minimally nondisplaced avulsion fracture of the radial head. The humerus and ulna are intact. IMPRESSION:  1. Minimally displaced radial head fracture. Dictated By:        Nabil Correa                                               Signed By:          <Electronically signed by Bang Silva D.O.>                                          10/24/22 7166                                                                                                                                                                          DD: 10/23/22                                                        TD/TT: 10/24/22 8051 : TEREZA    XR WRIST LEFT (MIN 3 VIEWS)    Result Date: 10/23/2022                                          [place] [address], [address] [facility]                                                                                          XRay Report / XR wrist LT 3V                                                 Signed    Patient: Seb Dale                                                                MR#: [id number]  [id number]          : 1970                                                                [de-identified]            Age/Sex: 50 / F                                                                ADM Date: 10/22/22            Loc: [facility]                                                                                    Attending Dr:   Janay Akhtar D.O. Ordering Physician: Vanessa Crespo M.D. Date of Service: 10/22/22  Procedure(s): XR wrist LT 3V  Accession Number(s): [id number]    cc: Yazmin Antnoio D.O.; Darnell Johnson M.D.:  4 views     CLINICAL HISTORY:  50year-old female who had a ground level fall off the curb injuring the elbow    COMPARISON:  No relevant prior studies available. FINDINGS:     No fractures or dislocations of the wrist.      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 No sclerotic or lytic lesions. No foreign bodies. IMPRESSION:  1. No evidence of a fracture. Dictated By:        Celine Acevedo.                                               Signed By:          <Electronically signed by Era Dueñas D.O.>                                          10/24/22 6931                                                                                                                                                                          DD: 10/23/22 TD/TT: 10/24/22 6575     : TEREZA      X-rays personally reviewed by me of 3 views left elbow do confirm a nondisplaced type I radial head fracture involving less than a quarter of the joint surface. Diagnosis Orders   1. Ganglion cyst of finger        2. Closed nondisplaced fracture of head of left radius, initial encounter              PLAN:  We took her out of splint here today. Recommend sling for comfort recommend early range of motion at left elbow. We will see her back in his office in 3 weeks time pre-clinic x-rays 2 views of the left elbow out of sling. With regards to her long finger things look good I recommend activity as tolerated with her long finger. No orders of the defined types were placed in this encounter.        Procedure:        Electronically signed by Tamica Amador PA-C on 10/25/2022 at 9:53 AM

## 2022-10-25 NOTE — LETTER
[facility] department of [place] [address]  Phone: [phone]  Fax: [phone]    Karen Campa        October 25, 2022     Patient: Geraldine Thurston   YOB: 1970   Date of Visit: 10/25/2022       To Whom It May Concern: It is my medical opinion that Kiki Young should remain out of work until 11/16/22. If you have any questions or concerns, please don't hesitate to call.     Sincerely,        Madison Sharif PA-C

## 2022-10-25 NOTE — PATIENT INSTRUCTIONS
Bactrim and Augmentin as directed. One serving of yogurt 2 hours before or after antibiotic reduces the occurrence of yeast infections and abdominal upset/diarrhea. Chlorhexidine wash every other day until gone. Bactroban nasal as directed for 5 days. Follow up with primary care provider in 1 to 2 days if needed.

## 2022-11-09 DIAGNOSIS — S52.125A CLOSED NONDISPLACED FRACTURE OF HEAD OF LEFT RADIUS, INITIAL ENCOUNTER: Primary | ICD-10-CM

## 2022-11-15 ENCOUNTER — HOSPITAL ENCOUNTER (OUTPATIENT)
Dept: GENERAL RADIOLOGY | Age: 52
Discharge: HOME | End: 2022-11-17
Payer: MEDICARE

## 2022-11-15 ENCOUNTER — OFFICE VISIT (OUTPATIENT)
Dept: ORTHOPEDIC SURGERY | Age: 52
End: 2022-11-15
Payer: MEDICARE

## 2022-11-15 VITALS
BODY MASS INDEX: 47.09 KG/M2 | HEIGHT: 66 IN | HEART RATE: 67 BPM | DIASTOLIC BLOOD PRESSURE: 84 MMHG | SYSTOLIC BLOOD PRESSURE: 122 MMHG | WEIGHT: 293 LBS

## 2022-11-15 DIAGNOSIS — S52.125D CLOSED NONDISPLACED FRACTURE OF HEAD OF LEFT RADIUS WITH ROUTINE HEALING, SUBSEQUENT ENCOUNTER: Primary | ICD-10-CM

## 2022-11-15 DIAGNOSIS — S52.125A CLOSED NONDISPLACED FRACTURE OF HEAD OF LEFT RADIUS, INITIAL ENCOUNTER: ICD-10-CM

## 2022-11-15 PROCEDURE — 99024 POSTOP FOLLOW-UP VISIT: CPT | Performed by: PHYSICIAN ASSISTANT

## 2022-11-15 PROCEDURE — 99211 OFF/OP EST MAY X REQ PHY/QHP: CPT | Performed by: PHYSICIAN ASSISTANT

## 2022-11-15 PROCEDURE — 73080 X-RAY EXAM OF ELBOW: CPT

## 2022-11-15 NOTE — LETTER
[facility] department of [place] [address]  Phone: [phone]  Fax: [phone]    Citlali Ng        November 15, 2022     Patient: Anish Kramer   YOB: 1970   Date of Visit: 11/15/2022       To Whom It May Concern: It is my medical opinion that Glenna Montana should remain out of work until 12/15/22. If you have any questions or concerns, please don't hesitate to call.     Sincerely,        Lucius Quintanilla PA-C

## 2022-11-15 NOTE — PROGRESS NOTES
Orthopaedic Progress Note      CHIEF COMPLAINT: Left radial head fracture    HISTORY OF PRESENT ILLNESS:       Ms. Rossy Ariza  is a 50 y.o. female  who presents with 3 weeks out from left radial head fracture patient is doing really well at this time not having new complaints of pain. She is able to flex and extend fingers. She is having difficulty with full extension of the elbow and supination pronation of the wrist and elbow. She is also status post cyst removal of the right hand finger long finger      Past Medical History:    Past Medical History:   Diagnosis Date    Gall bladder disease     Heartburn     Morbid obesity with BMI of 40.0-44.9, adult [facility])        Past Surgical History:    Past Surgical History:   Procedure Laterality Date    CHOLECYSTECTOMY, LAPAROSCOPIC  2014    with OP GRAMS    HAND SURGERY Right 10/11/2022    Excision Cyst Right Long Finger performed by Smitha Clement MD at [address] [place] [address]  2013    full mouth tooth extraction    OTHER SURGICAL HISTORY  2009    perineal suture repair following childbirth    TUBAL LIGATION           Current  Medications:  Current Outpatient Medications   Medication Sig Dispense Refill    mupirocin (BACTROBAN NASAL) 2 % nasal ointment by Nasal route 2 times daily for 5 days Take by Nasal route 2 times daily. 1 each 0     No current facility-administered medications for this visit. Allergies:  Patient has no known allergies.     Social History:   Social History     Tobacco Use   Smoking Status Former    Packs/day: 0.00    Years: 10.00    Pack years: 0.00    Types: Cigarettes    Quit date: 2009    Years since quittin.4   Smokeless Tobacco Never     Social History     Substance and Sexual Activity   Alcohol Use No     Social History     Substance and Sexual Activity   Drug Use No       Family History:  Family History   Problem Relation Age of Onset    High Blood Pressure Mother     Other Mother         Glaucoma Diabetes Mother     Cancer Father         lung    Heart Disease Sister     Stroke Sister     Heart Disease Brother     Diabetes Paternal Grandmother     Cancer Paternal Grandmother         breast    Heart Disease Paternal Grandfather        REVIEW OF SYSTEMS:  Constitutional: Denies any fever, chills. Musculoskeletal: Positive for left radial head fracture. PHYSICAL EXAM:  [unfilled]  General appearance:  Alert and oriented x 3. No apparent distress, appears stated age, calm and cooperative. Musculoskeletal: Significant difficulty with full extension of elbow. Difficulty with supination and pronation of the wrist.  Pain to palpation left elbow. DATA:  CBC:   Lab Results   Component Value Date/Time    WBC 6.3 07/14/2022 12:26 PM    WBC 4.7 06/24/2014 07:57 AM    HGB 14.2 07/14/2022 12:26 PM    .9 07/14/2022 12:26 PM     BMP:    Lab Results   Component Value Date/Time     07/14/2022 12:26 PM    K 3.8 07/14/2022 12:26 PM     07/14/2022 12:26 PM    CO2 24 07/14/2022 12:26 PM    BUN 7 07/14/2022 12:26 PM    CREATININE 0.6 07/14/2022 12:26 PM    CALCIUM 8.5 07/14/2022 12:26 PM    GLUCOSE 101 07/14/2022 12:26 PM     PT/INR:  No results found for: PROTIME, INR  Troponin:  No results found for: TROPONINI  No results for input(s): LIPASE, AMYLASE in the last 72 hours. No results for input(s): AST, ALT, BILIDIR, BILITOT, ALKPHOS in the last 72 hours.   Uric Acid:  No components found for: URIC  Urine Culture:  No components found for: CURINE    Radiology:   XR ELBOW LEFT (MIN 3 VIEWS)    Result Date: 10/23/2022                                          [facility]                                                                                        XRay Report / XR elbow LT min 3V                                                 Signed    Patient: General acute hospital Qian Foley                                                                MR#: [id number]  [id number]          : 1970                                                                [de-identified]            Age/Sex: 50 / F                                                                ADM Date: 10/22/22            Loc: [facility]                                                                                    Attending Dr:   Sabas Jimenez D.O. Ordering Physician: Chevy Melchor M.D. Date of Service: 10/22/22  Procedure(s): XR elbow LT min 3V  Accession Number(s): [id number]    cc: Yazmin Antonio D.O.; Darnell Johnson M.D.:  3 views     CLINICAL HISTORY:  50year-old female who had a ground level fall off the curb injuring the elbow    COMPARISON:  No relevant prior studies available. FINDINGS:     There is a joint effusion. There is a minimally nondisplaced avulsion fracture of the radial head. The humerus and ulna are intact. IMPRESSION:  1. Minimally displaced radial head fracture. Dictated By:        Samantha Sexton.                                               Signed By:          <Electronically signed by Eugenia Colvin D.O.>                                          10/24/22 0815                                                                                                                                                                          DD: 10/23/22                                                        TD/TT: 10/24/22 0741     : TEREZA    XR WRIST LEFT (MIN 3 VIEWS)    Result Date: 10/23/2022                                          [address] [address]                                                                                          XRay Report / XR wrist LT 3V Signed    PatientCldillon Guillaume                                                                MR#: [id number]  [id number]          : 1970                                                                [de-identified]            Age/Sex: 50 / F                                                                ADM Date: 10/22/22            Loc: [facility]                                                                                    Attending Dr:   Delbert HENDERSON.OGuilherme Ordering Physician: Romelia Talamantes M.D. Date of Service: 10/22/22  Procedure(s): XR wrist LT 3V  Accession Number(s): [id number]    cc: Yazmin Antonio D.O.; Darnell Johnson M.D. Batter:  4 views     CLINICAL HISTORY:  50year-old female who had a ground level fall off the curb injuring the elbow    COMPARISON:  No relevant prior studies available. FINDINGS:     No fractures or dislocations of the wrist.      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 No sclerotic or lytic lesions. No foreign bodies. IMPRESSION:  1. No evidence of a fracture. Dictated By:        Rabia Graves. Signed By:          <Electronically signed by Anibal Vega D.O.>                                          10/24/22 7068                                                                                                                                                                          DD: 10/23/22                                                        TD/TT: 10/24/22 6847     : TEREZA      X-rays personally reviewed by me of 3 views left elbow reveal healing routine fashion radial head fracture no further displacement is noted. Diagnosis Orders   1. Closed nondisplaced fracture of head of left radius with routine healing, subsequent encounter              PLAN:  Continue sling follow-up with us 3 to 4 weeks repeat x-rays left elbow.   The patient remain off of work until follow-up. No orders of the defined types were placed in this encounter.        Procedure:        Electronically signed by Sydney Edwards PA-C on 11/15/2022 at 10:05 AM

## 2022-11-30 DIAGNOSIS — S52.125D CLOSED NONDISPLACED FRACTURE OF HEAD OF LEFT RADIUS WITH ROUTINE HEALING, SUBSEQUENT ENCOUNTER: Primary | ICD-10-CM

## 2022-12-06 ENCOUNTER — OFFICE VISIT (OUTPATIENT)
Dept: ORTHOPEDIC SURGERY | Age: 52
End: 2022-12-06
Payer: MEDICARE

## 2022-12-06 ENCOUNTER — HOSPITAL ENCOUNTER (OUTPATIENT)
Dept: GENERAL RADIOLOGY | Age: 52
Discharge: HOME | End: 2022-12-08
Payer: MEDICARE

## 2022-12-06 VITALS
SYSTOLIC BLOOD PRESSURE: 130 MMHG | WEIGHT: 293 LBS | DIASTOLIC BLOOD PRESSURE: 74 MMHG | BODY MASS INDEX: 47.09 KG/M2 | HEIGHT: 66 IN | HEART RATE: 80 BPM

## 2022-12-06 DIAGNOSIS — S52.125D CLOSED NONDISPLACED FRACTURE OF HEAD OF LEFT RADIUS WITH ROUTINE HEALING, SUBSEQUENT ENCOUNTER: Primary | ICD-10-CM

## 2022-12-06 DIAGNOSIS — S52.125D CLOSED NONDISPLACED FRACTURE OF HEAD OF LEFT RADIUS WITH ROUTINE HEALING, SUBSEQUENT ENCOUNTER: ICD-10-CM

## 2022-12-06 PROCEDURE — 99024 POSTOP FOLLOW-UP VISIT: CPT | Performed by: PHYSICIAN ASSISTANT

## 2022-12-06 PROCEDURE — 99211 OFF/OP EST MAY X REQ PHY/QHP: CPT | Performed by: PHYSICIAN ASSISTANT

## 2022-12-06 PROCEDURE — 73080 X-RAY EXAM OF ELBOW: CPT

## 2022-12-06 NOTE — PROGRESS NOTES
Orthopaedic Progress Note      CHIEF COMPLAINT:  left radial head fracture     HISTORY OF PRESENT ILLNESS:       Ms. Dilshad Torres  is a 50 y.o. female  who presents with 6 weeks post injury to left radial head fracture. Lacking 15 degrees full extension of elbow. Will proceed with therapy to improve ROM and strength. Works at [employer]       Past Medical History:    Past Medical History:   Diagnosis Date    Gall bladder disease     Heartburn     Morbid obesity with BMI of 40.0-44.9, adult [facility])        Past Surgical History:    Past Surgical History:   Procedure Laterality Date    CHOLECYSTECTOMY, LAPAROSCOPIC  2014    with OP GRAMS    HAND SURGERY Right 10/11/2022    Excision Cyst Right Long Finger performed by Joceline Berry MD at [address] [place] [address]  2013    full mouth tooth extraction    OTHER SURGICAL HISTORY  2009    perineal suture repair following childbirth    TUBAL LIGATION           Current  Medications:  Current Outpatient Medications   Medication Sig Dispense Refill    mupirocin (BACTROBAN NASAL) 2 % nasal ointment by Nasal route 2 times daily for 5 days Take by Nasal route 2 times daily. 1 each 0     No current facility-administered medications for this visit. Allergies:  Patient has no known allergies.     Social History:   Social History     Tobacco Use   Smoking Status Former    Packs/day: 0.00    Years: 10.00    Pack years: 0.00    Types: Cigarettes    Quit date: 2009    Years since quittin.4   Smokeless Tobacco Never     Social History     Substance and Sexual Activity   Alcohol Use No     Social History     Substance and Sexual Activity   Drug Use No       Family History:  Family History   Problem Relation Age of Onset    High Blood Pressure Mother     Other Mother         Glaucoma    Diabetes Mother     Cancer Father         lung    Heart Disease Sister     Stroke Sister     Heart Disease Brother     Diabetes Paternal Grandmother     Cancer Paternal Grandmother         breast    Heart Disease Paternal Grandfather        REVIEW OF SYSTEMS:  Constitutional: Denies any fever, chills. Musculoskeletal: Positive for left radial head fracture . PHYSICAL EXAM:  [unfilled]  General appearance:  Alert and oriented x 3. No apparent distress, appears stated age, calm and cooperative. Musculoskeletal:  left elbow lacking full extention by 15 degrees  Full flex  Decreased pain with supination and pronation . DATA:  CBC:   Lab Results   Component Value Date/Time    WBC 6.3 07/14/2022 12:26 PM    WBC 4.7 06/24/2014 07:57 AM    HGB 14.2 07/14/2022 12:26 PM    .9 07/14/2022 12:26 PM     BMP:    Lab Results   Component Value Date/Time     07/14/2022 12:26 PM    K 3.8 07/14/2022 12:26 PM     07/14/2022 12:26 PM    CO2 24 07/14/2022 12:26 PM    BUN 7 07/14/2022 12:26 PM    CREATININE 0.6 07/14/2022 12:26 PM    CALCIUM 8.5 07/14/2022 12:26 PM    GLUCOSE 101 07/14/2022 12:26 PM     PT/INR:  No results found for: PROTIME, INR  Troponin:  No results found for: TROPONINI  No results for input(s): LIPASE, AMYLASE in the last 72 hours. No results for input(s): AST, ALT, BILIDIR, BILITOT, ALKPHOS in the last 72 hours. Uric Acid:  No components found for: URIC  Urine Culture:  No components found for: CURINE    Radiology:   XR ELBOW LEFT (MIN 3 VIEWS)    Result Date: 11/15/2022  EXAMINATION: THREE XRAY VIEWS OF THE LEFT ELBOW 11/15/2022 8:06 am COMPARISON: October 22, 2022 HISTORY: ORDERING SYSTEM PROVIDED HISTORY: Closed nondisplaced fracture of head of left radius, initial encounter TECHNOLOGIST PROVIDED HISTORY: Reason for Exam: Closed nondisplaced fracture of head of left radius/ f/u FINDINGS: Stable alignment of longitudinally oriented comminuted fracture of the radial head with fracture line less distinct. No new osseous abnormality. No significant joint effusion. Soft tissues unremarkable.      Stable alignment of healing radial head fracture X-rays personally reviewed by me of healing radial head fracture noted on 3 views done here today        Diagnosis Orders   1. Closed nondisplaced fracture of head of left radius with routine healing, subsequent encounter  [facility] [place]            PLAN:  Physical therapy ROM and strength   Follow up 3 weeks repeat xray  Off work until follow up     No orders of the defined types were placed in this encounter.        Procedure:        Electronically signed by Geronimo Hurley PA-C on 12/6/2022 at 10:35 AM

## 2022-12-08 ENCOUNTER — HOSPITAL ENCOUNTER (OUTPATIENT)
Dept: PHYSICAL THERAPY | Age: 52
Setting detail: THERAPIES SERIES
Discharge: HOME | End: 2022-12-08
Payer: MEDICARE

## 2022-12-08 PROCEDURE — 97161 PT EVAL LOW COMPLEX 20 MIN: CPT | Performed by: PHYSICAL THERAPIST

## 2022-12-08 ASSESSMENT — PAIN SCALES - GENERAL: PAINLEVEL_OUTOF10: 0

## 2022-12-08 ASSESSMENT — PAIN DESCRIPTION - LOCATION: LOCATION: ARM

## 2022-12-08 ASSESSMENT — PAIN DESCRIPTION - PAIN TYPE: TYPE: ACUTE PAIN

## 2022-12-08 ASSESSMENT — PAIN - FUNCTIONAL ASSESSMENT: PAIN_FUNCTIONAL_ASSESSMENT: PREVENTS OR INTERFERES WITH MANY ACTIVE NOT PASSIVE ACTIVITIES

## 2022-12-08 ASSESSMENT — PAIN DESCRIPTION - ONSET: ONSET: SUDDEN

## 2022-12-08 ASSESSMENT — PAIN DESCRIPTION - DESCRIPTORS: DESCRIPTORS: ACHING;TIGHTNESS

## 2022-12-08 ASSESSMENT — PAIN DESCRIPTION - FREQUENCY: FREQUENCY: INTERMITTENT

## 2022-12-08 ASSESSMENT — PAIN DESCRIPTION - ORIENTATION: ORIENTATION: LEFT

## 2022-12-08 NOTE — PROGRESS NOTES
Physical Therapy  Initial Assessment  Date: 2022  Patient Name: Isma Schneider  MRN: [id number]  : 1970    Referring Physician: MAGDIEL Rai   PCP: Tonja Bernal DO     Medical Diagnosis: Closed nondisplaced fracture of head of left radius with routine healing, subsequent encounter [S52.125D] S52.125D Fx head L radius  Treatment Diagnosis: S52.125D Fx head L radius      Insurance: Payor: [employer] / Plan: [employer] / Product Type: *No Product type* /   Insurance ID: [id number] - (Medicaid Managed)      Restrictions:- none       Subjective:   General  Chart Reviewed: Yes  Patient Assessed for Rehabilitation Services: Yes  History obtained from[de-identified] Patient, Chart Review  Family/Caregiver Present: No  Diagnosis: S52.125D Fx head L radius  Referring Provider (secondary): Cesia VIDAL  Follows Commands: Within Functional Limits  PT Visit Information  Onset Date: 10/22/22  PT Insurance Information: [place] [employer]age  Referring Provider (secondary): Cesia VIDAL  Subjective  Subjective: Rosy Orf on 10/22/22 and Fx L elbow. Was in a sling about 3-4 weeks.   Prior diagnostic testing[de-identified] X-ray  Dominant Hand: : Right  Pain Screening  Patient Currently in Pain: Yes  Pain Assessment: 0-10  Pain Level: 0  Best Pain Level: 0  Worst Pain Level: 5  Patient's Stated Pain Goal: 1  Pain Type: Acute pain  Pain Location: Arm  Pain Orientation: Left  Pain Radiating Towards: L elbow, forearm  Pain Descriptors: Aching, Tightness  Pain Frequency: Intermittent  Pain Onset: Sudden  Functional Pain Assessment: Prevents or interferes with many active not passive activities  Aggravating factors: Lifting, Carrying, Reaching       Vision/Hearing:  Vision  Vision: Within Functional Limits  Hearing  Hearing: Within functional limits    Orientation:  Orientation  Overall Orientation Status: Within Normal Limits  Follows Commands: Within Functional Limits    Social History:  Social History  Type of Home: House    Functional Status:  Functional Status  Prior level of function: Independent  Occupation: Full time employment  Type of Occupation: [employer]- [employer]/ [employer]. 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 Receives Help From: Family  ADL Assistance: Independent  Homemaking Assistance: Independent  Ambulation Assistance: Independent  Transfer Assistance: Independent  Active : Yes    Objective:     PROM LUE (degrees)  L Elbow Flex/Ext (0-145): -20 deg extension, 95 deg flexion. + pain & tightness  L Forearm Pron  (0-90): 85  L Forearm Sup  (0-90): 70 +pain  AROM LUE (degrees)  L Elbow Flexion (0-145): 95  L Elbow Extension (145-0): -25  L Forearm Pron (0-90): 80  L Forearm Supination  (0-90): 60    Strength LUE  Comment: +pain all resisted motion  L Elbow Flexion: 4-/5  L Elbow Extension: 4-/5  L Forearm Pron: 4-/5  L Forearm Sup: 3+/5  L Wrist Flexion: 4+/5  L Wrist Extension: 4+/5  Strength Other  Other:  level 2  L 26 psi, R 42 psi     Additional Measures  Special Tests: Tender L radio-humeral joint     Assessment:    Conditions Requiring Skilled Therapeutic Intervention  Body Structures, Functions, Activity Limitations Requiring Skilled Therapeutic Intervention: Decreased strength;Decreased ROM; Increased pain  Therapy Prognosis: Good  Treatment Diagnosis: S52.125D Fx head L radius  Activity Tolerance  Activity Tolerance: Patient tolerated treatment well  Activity Tolerance: Patient tolerated treatment well         Plan:    Physcial Therapy Plan  Plan weeks: 4  Current Treatment Recommendations: Strengthening, ROM, Home exercise program, Manual    OutComes Score:   UEFI 59/80 = 73.75%           Goals:  Short Term Goals  Time Frame for Short Term Goals: 1 week  Short Term Goal 1: Start HEP  Long Term Goals  Time Frame for Long Term Goals : 4 weeks  Long Term Goal 1: L elbow AROM 135 deg flexion, extension -5 deg for efficient reaching & ADL  Long Term Goal 2: 5-/5 strength for efficient RTW  Long Term Goal 3: Able to wt bear on L UE for pushing up from chair or floor level  Long Term Goal 4: RTW regular duty and able to lift 6-7$ materials       Therapy Time:   Individual Concurrent Group Co-treatment   Time In  8:00         Time Out  8:40         Minutes  40                 Kittie Soulier, [place]

## 2022-12-08 NOTE — PROGRESS NOTES
Physical Therapy    Physical Therapy Daily Treatment Note    Date:  2022    Patient Name:  Eve Howell    :  1970  MRN: [id number]  Restrictions/Precautions:     Medical/Treatment Diagnosis Information:   Diagnosis: S52.125D Fx head L radius  Treatment Diagnosis: S52.125D Fx head L radius  Insurance/Certification information:  PT Insurance Information: [place] Advantage  Physician Information:   Berto VIDAL  Plan of care signed (Y/N):  n  Visit# / total visits:  1/10  Pain level: 5/10       Time In:8:00   Time Out:8:40    Progress Note: [x]  Yes  []  No  Next due by: Visit #10, or 23      Subjective:  See eval     Objective:See  Eval  Observation:   Test measurements:      Exercises:   Exercise/Equipment Resistance/Repetitions Other comments   Isai     Wand Bench press -stand     L Internal rotation stretch     T-band elbow extension Red 20x    T-band elbow curl Red 20x    Tricep overhead extension     Bicep 3-way curl     Pronate-supinate     Wrist roller red All directions         Cybex lat pull     Cybex rowing               [x] Provided verbal/tactile cueing for activities related to strengthening, flexibility, endurance, ROM. (94056)  [] Provided verbal/tactile cueing for activities related to improving balance, coordination, kinesthetic sense, posture, motor skill, proprioception. (66209)    Therapeutic Activities:     [] Therapeutic activities, direct (one-on-one) patient contact (use of dynamic activities to improve functional performance). (81031)    Gait:   [] Provided training and instruction to the patient for ambulation re-education. (11722)    Self-Care/ADL's  [] Self-care/home management training and compensatory training, meal preparation, safety procedures, and instructions in use of assistive technology devices/adaptive equipment, direct one-on-one contact.  (00740)    Home Exercise Program:  T-band elboe flex & ext   [x] Reviewed/Progressed HEP activities related to strengthening, flexibility, endurance, ROM. (19838)  [] Reviewed/Progressed HEP activities related to improving balance, coordination, kinesthetic sense, posture, motor skill, proprioception.  (33107)    Manual Treatments:    [] Provided manual therapy to mobilize soft tissue/joints for the purpose of modulating pain, promoting relaxation,  increasing ROM, reducing/eliminating soft tissue swelling/inflammation/restriction, improving soft tissue extensibility. (76371)    Service Based Modalities: Eval 40'     Timed Code Treatment Minutes:        Total Treatment Minutes:   36'    Treatment/Activity Tolerance:  [x] Patient tolerated treatment well [] Patient limited by fatique  [] Patient limited by pain  [] Patient limited by other medical complications  [] Other:     Prognosis: [x] Good [] Fair  [] Poor    Patient Requires Follow-up: [x] Yes  [] No      Goals:  Short Term Goals  Time Frame for Short Term Goals: 1 week  Short Term Goal 1: Start HEP    Long Term Goals  Time Frame for Long Term Goals : 4 weeks  Long Term Goal 1: L elbow AROM 135 deg flexion, extension -5 deg for efficient reaching & ADL  Long Term Goal 2: 5-/5 strength for efficient RTW  Long Term Goal 3: Able to wt bear on L UE for pushing up from chair or floor level  Long Term Goal 4: RTW regular duty and able to lift 6-7$ materials          Plan:   [] Continue per plan of care [] Alter current plan (see comments)  [x] Plan of care initiated [] Hold pending MD visit [] Discharge  Plan for Next Session:      Electronically signed by:  Amber Handley PT,PT

## 2022-12-08 NOTE — PLAN OF CARE
[facility]    [x] [place]  Phone: [phone]  Fax: [phone]      [] [place]  Phone: [phone]  Fax: [phone]        To:        Patient: Isma Schneider  : 1970   MRN: [id number]  Evaluation Date: 2022      Diagnosis Information:  Diagnosis: S52.125D Fx head L radius   Treatment Diagnosis: S52.125D Fx head L radius     Physical Therapy Certification Form  Dear Cesia VIDAL  The following patient has been evaluated for physical therapy services and for therapy to continue, Medicare requires monthly physician review of the treatment plan. Please review the attached evaluation and/or summary of the patient's plan of care, and verify that you agree therapy should continue by signing the attached document and sending it back to our office.     Plan of Care/Treatment to date:  [x] Therapeutic Exercise    [] Modalities:  [] Therapeutic Activity     [] Ultrasound  [] Electrical Stimulation  [] Gait Training      [] Cervical Traction [] Lumbar Traction  [] Neuromuscular Re-education    [] Cold/hotpack [] Iontophoresis   [x] Instruction in HEP     Other:  [x] Manual Therapy      []             [] Aquatic Therapy      []                 Goals:  Short Term Goals  Time Frame for Short Term Goals: 1 week  Short Term Goal 1: Start HEP    Long Term Goals  Time Frame for Long Term Goals : 4 weeks  Long Term Goal 1: L elbow AROM 135 deg flexion, extension -5 deg for efficient reaching & ADL  Long Term Goal 2: 5-/5 strength for efficient RTW  Long Term Goal 3: Able to wt bear on L UE for pushing up from chair or floor level  Long Term Goal 4: RTW regular duty and able to lift 6-7$ materials    Frequency/Duration:22 - 23  # Days per week: [] 1 day # Weeks: [] 1 week [] 5 weeks     [] 2 days   [] 2 weeks [] 6 weeks     [x] 3 days   [] 3 weeks [] 7 weeks     [] 4 days   [x] 4 weeks [] 8 weeks    Rehab Potential: [] Excellent [x] Good [] Fair  [] Poor     Electronically signed by:  Von Pink PT      If you have any questions or concerns, please don't hesitate to call.   Thank you for your referral.      Physician Signature:________________________________Date:__________________  By signing above, therapists plan is approved by physician

## 2022-12-19 ENCOUNTER — HOSPITAL ENCOUNTER (OUTPATIENT)
Dept: PHYSICAL THERAPY | Age: 52
Setting detail: THERAPIES SERIES
End: 2022-12-19
Payer: MEDICARE

## 2022-12-21 ENCOUNTER — HOSPITAL ENCOUNTER (OUTPATIENT)
Dept: PHYSICAL THERAPY | Age: 52
Setting detail: THERAPIES SERIES
Discharge: HOME | End: 2022-12-21
Payer: MEDICARE

## 2022-12-21 DIAGNOSIS — S52.125D CLOSED NONDISPLACED FRACTURE OF HEAD OF LEFT RADIUS WITH ROUTINE HEALING, SUBSEQUENT ENCOUNTER: Primary | ICD-10-CM

## 2022-12-21 PROCEDURE — 97110 THERAPEUTIC EXERCISES: CPT

## 2022-12-21 NOTE — PROGRESS NOTES
Physical Therapy    Physical Therapy Daily Treatment Note    Date:  2022    Patient Name:  Yissel Clemens    :  1970  MRN: [id number]  Restrictions/Precautions:     Medical/Treatment Diagnosis Information:   Diagnosis: S52.125D Fx head L radius  Treatment Diagnosis: S52.125D Fx head L radius  Insurance/Certification information:  PT Insurance Information: [place] Advantage  Physician Information:   Stevenson VIDAL  Plan of care signed (Y/N):  n  Visit# / total visits:  2/10  Pain level: 2/10       Time In: 11:00   Time Out:    Progress Note: [x]  Yes  []  No  Next due by: Visit #10, or 23      Subjective: Overall rates pain 2/10, it has been feeling a lot better. She is compliant with HEP. Objective: Patient goes back to the doctor 22 and states she will be returning to work 22 no matter what is said. Max potential weight she has to lift is 20-25#. Tolerated session well. Observation:   Test measurements:      Exercises:   Exercise/Equipment Resistance/Repetitions Other comments   [place] 4'    Wand Bench press -stand 10x    L Internal rotation stretch 10\"x10    T-band elbow extension Red 20x    T-band elbow curl Red 20x    Tricep overhead extension 10x    Bicep 3-way curl 1#, 3x10    Pronate-supinate     Wrist roller Red, x15 All directions    Elbow ext stretch 2', 3# Towel under   Cybex lat pull 10x, 50#    Cybex rowing 10x, 50#    Wall push up 15x    UBE 3' retro, 3' fwd Level 3   [x] Provided verbal/tactile cueing for activities related to strengthening, flexibility, endurance, ROM. (29725)  [] Provided verbal/tactile cueing for activities related to improving balance, coordination, kinesthetic sense, posture, motor skill, proprioception. (58641)    Therapeutic Activities:     [] Therapeutic activities, direct (one-on-one) patient contact (use of dynamic activities to improve functional performance).  (21449)    Gait:   [] Provided training and instruction to the patient for ambulation re-education. (68747)    Self-Care/ADL's  [] Self-care/home management training and compensatory training, meal preparation, safety procedures, and instructions in use of assistive technology devices/adaptive equipment, direct one-on-one contact. (27662)    Home Exercise Program:  T-band elboe flex & ext   [x] Reviewed/Progressed HEP activities related to strengthening, flexibility, endurance, ROM. (33239)  [] Reviewed/Progressed HEP activities related to improving balance, coordination, kinesthetic sense, posture, motor skill, proprioception.  (27499)    Manual Treatments:    [] Provided manual therapy to mobilize soft tissue/joints for the purpose of modulating pain, promoting relaxation,  increasing ROM, reducing/eliminating soft tissue swelling/inflammation/restriction, improving soft tissue extensibility. (96325)    Service Based Modalities:   CARLITOS 35'     Timed Code Treatment Minutes:        Total Treatment Minutes:   35'    Treatment/Activity Tolerance:  [x] Patient tolerated treatment well [] Patient limited by fatique  [] Patient limited by pain  [] Patient limited by other medical complications  [] Other:     Prognosis: [x] Good [] Fair  [] Poor    Patient Requires Follow-up: [x] Yes  [] No      Goals:  Short Term Goals  Time Frame for Short Term Goals: 1 week  Short Term Goal 1: Start HEP    Long Term Goals  Time Frame for Long Term Goals : 4 weeks  Long Term Goal 1: L elbow AROM 135 deg flexion, extension -5 deg for efficient reaching & ADL  Long Term Goal 2: 5-/5 strength for efficient RTW  Long Term Goal 3: Able to wt bear on L UE for pushing up from chair or floor level  Long Term Goal 4: RTW regular duty and able to lift 6-7# materials          Plan:   [] Continue per plan of care [] Alter current plan (see comments)  [x] Plan of care initiated [] Hold pending MD visit [] Discharge    Plan for Next Session:      Electronically signed by:  Zakiya Dempsey PTA

## 2022-12-22 NOTE — PROGRESS NOTES
I have reviewed and agree to the content of the note written by the PTA.   Electronically signed by Bryan Klein PT [id number]

## 2022-12-23 ENCOUNTER — HOSPITAL ENCOUNTER (OUTPATIENT)
Dept: PHYSICAL THERAPY | Age: 52
Setting detail: THERAPIES SERIES
End: 2022-12-23
Payer: MEDICARE

## 2022-12-27 ENCOUNTER — OFFICE VISIT (OUTPATIENT)
Dept: ORTHOPEDIC SURGERY | Age: 52
End: 2022-12-27
Payer: MEDICARE

## 2022-12-27 ENCOUNTER — HOSPITAL ENCOUNTER (OUTPATIENT)
Dept: GENERAL RADIOLOGY | Age: 52
Discharge: HOME | End: 2022-12-29
Payer: MEDICARE

## 2022-12-27 VITALS
SYSTOLIC BLOOD PRESSURE: 132 MMHG | DIASTOLIC BLOOD PRESSURE: 78 MMHG | HEIGHT: 66 IN | BODY MASS INDEX: 47.09 KG/M2 | HEART RATE: 71 BPM | OXYGEN SATURATION: 97 % | RESPIRATION RATE: 16 BRPM | WEIGHT: 293 LBS

## 2022-12-27 DIAGNOSIS — S52.125D CLOSED NONDISPLACED FRACTURE OF HEAD OF LEFT RADIUS WITH ROUTINE HEALING, SUBSEQUENT ENCOUNTER: Primary | ICD-10-CM

## 2022-12-27 DIAGNOSIS — S52.125D CLOSED NONDISPLACED FRACTURE OF HEAD OF LEFT RADIUS WITH ROUTINE HEALING, SUBSEQUENT ENCOUNTER: ICD-10-CM

## 2022-12-27 PROCEDURE — 99024 POSTOP FOLLOW-UP VISIT: CPT | Performed by: PHYSICIAN ASSISTANT

## 2022-12-27 PROCEDURE — 99212 OFFICE O/P EST SF 10 MIN: CPT | Performed by: PHYSICIAN ASSISTANT

## 2022-12-27 PROCEDURE — 73080 X-RAY EXAM OF ELBOW: CPT

## 2022-12-27 NOTE — PROGRESS NOTES
Orthopaedic Progress Note      CHIEF COMPLAINT: Left radial head fracture    HISTORY OF PRESENT ILLNESS:       Ms. Akhil Agudelo  is a 50 y.o. female  who presents with left radial head fracture she is approximately 9 weeks out at this time. She is lacking 15 degrees of full extension of the elbow at this time. She is not have any increasing pain. She does feel as if she is radial back to work. She has been working physical therapy she has 2 more visits. Past Medical History:    Past Medical History:   Diagnosis Date    Gall bladder disease     Heartburn     Morbid obesity with BMI of 40.0-44.9, adult [facility])        Past Surgical History:    Past Surgical History:   Procedure Laterality Date    CHOLECYSTECTOMY, LAPAROSCOPIC  2014    with OP GRAMS    HAND SURGERY Right 10/11/2022    Excision Cyst Right Long Finger performed by José Luis Colorado MD at [address]      full mouth tooth extraction    OTHER SURGICAL HISTORY  2009    perineal suture repair following childbirth    TUBAL LIGATION           Current  Medications:  Current Outpatient Medications   Medication Sig Dispense Refill    mupirocin (BACTROBAN NASAL) 2 % nasal ointment by Nasal route 2 times daily for 5 days Take by Nasal route 2 times daily. 1 each 0     No current facility-administered medications for this visit. Allergies:  Patient has no known allergies.     Social History:   Social History     Tobacco Use   Smoking Status Former    Packs/day: 0.00    Years: 10.00    Pack years: 0.00    Types: Cigarettes    Quit date: 2009    Years since quittin.5   Smokeless Tobacco Never     Social History     Substance and Sexual Activity   Alcohol Use No     Social History     Substance and Sexual Activity   Drug Use No       Family History:  Family History   Problem Relation Age of Onset    High Blood Pressure Mother     Other Mother         Glaucoma    Diabetes Mother     Cancer Father         lung Heart Disease Sister     Stroke Sister     Heart Disease Brother     Diabetes Paternal Grandmother     Cancer Paternal Grandmother         breast    Heart Disease Paternal Grandfather        REVIEW OF SYSTEMS:  Constitutional: Denies any fever, chills. Musculoskeletal: Positive for left elbow lacking 15 degrees full extension status post left radial head fracture treated nonoperatively. PHYSICAL EXAM:  [unfilled]  General appearance:  Alert and oriented x 3. No apparent distress, appears stated age, calm and cooperative. Musculoskeletal: Left elbow examined lacking 15 degrees of full extension to does have full flexion. No pain with supination pronation of the wrist.  Does have some mild tenderness palpation about the lateral aspect of the elbow. Les Pimple DATA:  CBC:   Lab Results   Component Value Date/Time    WBC 6.3 07/14/2022 12:26 PM    WBC 4.7 06/24/2014 07:57 AM    HGB 14.2 07/14/2022 12:26 PM    .9 07/14/2022 12:26 PM     BMP:    Lab Results   Component Value Date/Time     07/14/2022 12:26 PM    K 3.8 07/14/2022 12:26 PM     07/14/2022 12:26 PM    CO2 24 07/14/2022 12:26 PM    BUN 7 07/14/2022 12:26 PM    CREATININE 0.6 07/14/2022 12:26 PM    CALCIUM 8.5 07/14/2022 12:26 PM    GLUCOSE 101 07/14/2022 12:26 PM     PT/INR:  No results found for: PROTIME, INR  Troponin:  No results found for: TROPONINI  No results for input(s): LIPASE, AMYLASE in the last 72 hours. No results for input(s): AST, ALT, BILIDIR, BILITOT, ALKPHOS in the last 72 hours.   Uric Acid:  No components found for: URIC  Urine Culture:  No components found for: CURINE    Radiology:   XR ELBOW LEFT (MIN 3 VIEWS)    Result Date: 12/6/2022  EXAMINATION: THREE XRAY VIEWS OF THE LEFT ELBOW 12/6/2022 9:48 am COMPARISON: November 15, 2022 HISTORY: ORDERING SYSTEM PROVIDED HISTORY: Closed nondisplaced fracture of head of left radius with routine healing, subsequent encounter TECHNOLOGIST PROVIDED HISTORY: Reason for Exam: F/u fx FINDINGS: Allowing for difference in positioning, no change in alignment displacement of comminuted radial head fracture. No new osseous abnormality. Soft tissues unremarkable. Stable exam       X-rays personally reviewed by me of she is noted to have some healing coming across the radial head fracture. Noted on 3 views left elbow      Left radial head fracture       PLAN:  Return to work Saturday follow-up with us on an as-needed basis. Did have discussion with her that it may increase in swelling after returning back to work and she can begin taking ibuprofen to help reduce the inflammation and joint pain as needed    No orders of the defined types were placed in this encounter.        Procedure:        Electronically signed by Iglesia Alvarez PA-C on 12/27/2022 at 10:01 AM

## 2022-12-27 NOTE — LETTER
[facility] of [place] [address]  Phone: [phone]  Fax: [phone]    Hope Ash MD        December 27, 2022     Patient: Emily Trimble   YOB: 1970   Date of Visit: 12/27/2022       To Whom It May Concern: It is my medical opinion that Crissie Fothergill may return to work on 12/31/22 with no restrictions. If you have any questions or concerns, please don't hesitate to call.     Sincerely,        Hope Ash MD

## 2022-12-28 ENCOUNTER — HOSPITAL ENCOUNTER (OUTPATIENT)
Dept: PHYSICAL THERAPY | Age: 52
Setting detail: THERAPIES SERIES
Discharge: HOME | End: 2022-12-28
Payer: MEDICARE

## 2022-12-28 NOTE — PROGRESS NOTES
Physical Therapy  Outpatient Physical Therapy    [] [place]  Phone: [phone]  Fax: [phone]      [] [place]  Phone: [phone]  Fax: [phone]    Physical Therapy  Cancellation/No-show Note  Patient Name:  Parish Andrews  :  1970   Date:  2022  Cancelled visits to date: 1  No-shows to date: 0    For today's appointment patient:  [x]  Cancelled  []  Rescheduled appointment  []  No-show     Reason given by patient:  []  Patient ill  []  Conflicting appointment  [x]  No transportation    []  Conflict with work  []  No reason given  []  Other:     Comments:      Electronically signed by: Marisela Tsai PTA

## 2022-12-30 ENCOUNTER — HOSPITAL ENCOUNTER (OUTPATIENT)
Dept: PHYSICAL THERAPY | Age: 52
Setting detail: THERAPIES SERIES
End: 2022-12-30
Payer: MEDICARE

## 2023-02-23 ENCOUNTER — TELEPHONE (OUTPATIENT)
Dept: FAMILY MEDICINE CLINIC | Age: 53
End: 2023-02-23

## 2023-02-23 NOTE — TELEPHONE ENCOUNTER
Patient is on list to establish with Matt Johnson. Left voicemail to call us back to schedule appt for physical- due anytime.

## 2023-03-01 ENCOUNTER — OFFICE VISIT (OUTPATIENT)
Dept: FAMILY MEDICINE CLINIC | Age: 53
End: 2023-03-01
Payer: MEDICAID

## 2023-03-01 ENCOUNTER — HOSPITAL ENCOUNTER (OUTPATIENT)
Dept: GENERAL RADIOLOGY | Age: 53
Discharge: HOME | End: 2023-03-03
Payer: MEDICAID

## 2023-03-01 VITALS
RESPIRATION RATE: 18 BRPM | DIASTOLIC BLOOD PRESSURE: 86 MMHG | BODY MASS INDEX: 47.09 KG/M2 | HEIGHT: 66 IN | HEART RATE: 81 BPM | OXYGEN SATURATION: 97 % | SYSTOLIC BLOOD PRESSURE: 132 MMHG | TEMPERATURE: 98.1 F | WEIGHT: 293 LBS

## 2023-03-01 DIAGNOSIS — M79.671 RIGHT FOOT PAIN: Primary | ICD-10-CM

## 2023-03-01 DIAGNOSIS — M79.671 RIGHT FOOT PAIN: ICD-10-CM

## 2023-03-01 PROCEDURE — 73630 X-RAY EXAM OF FOOT: CPT

## 2023-03-01 PROCEDURE — G8427 DOCREV CUR MEDS BY ELIG CLIN: HCPCS | Performed by: NURSE PRACTITIONER

## 2023-03-01 PROCEDURE — 3017F COLORECTAL CA SCREEN DOC REV: CPT | Performed by: NURSE PRACTITIONER

## 2023-03-01 PROCEDURE — G8484 FLU IMMUNIZE NO ADMIN: HCPCS | Performed by: NURSE PRACTITIONER

## 2023-03-01 PROCEDURE — 1036F TOBACCO NON-USER: CPT | Performed by: NURSE PRACTITIONER

## 2023-03-01 PROCEDURE — G8417 CALC BMI ABV UP PARAM F/U: HCPCS | Performed by: NURSE PRACTITIONER

## 2023-03-01 PROCEDURE — 99213 OFFICE O/P EST LOW 20 MIN: CPT | Performed by: NURSE PRACTITIONER

## 2023-03-01 SDOH — ECONOMIC STABILITY: FOOD INSECURITY: WITHIN THE PAST 12 MONTHS, YOU WORRIED THAT YOUR FOOD WOULD RUN OUT BEFORE YOU GOT MONEY TO BUY MORE.: NEVER TRUE

## 2023-03-01 SDOH — ECONOMIC STABILITY: INCOME INSECURITY: HOW HARD IS IT FOR YOU TO PAY FOR THE VERY BASICS LIKE FOOD, HOUSING, MEDICAL CARE, AND HEATING?: NOT HARD AT ALL

## 2023-03-01 SDOH — ECONOMIC STABILITY: HOUSING INSECURITY
IN THE LAST 12 MONTHS, WAS THERE A TIME WHEN YOU DID NOT HAVE A STEADY PLACE TO SLEEP OR SLEPT IN A SHELTER (INCLUDING NOW)?: NO

## 2023-03-01 SDOH — ECONOMIC STABILITY: FOOD INSECURITY: WITHIN THE PAST 12 MONTHS, THE FOOD YOU BOUGHT JUST DIDN'T LAST AND YOU DIDN'T HAVE MONEY TO GET MORE.: NEVER TRUE

## 2023-03-01 ASSESSMENT — PATIENT HEALTH QUESTIONNAIRE - PHQ9
SUM OF ALL RESPONSES TO PHQ QUESTIONS 1-9: 0
2. FEELING DOWN, DEPRESSED OR HOPELESS: 0
SUM OF ALL RESPONSES TO PHQ QUESTIONS 1-9: 0
1. LITTLE INTEREST OR PLEASURE IN DOING THINGS: 0
SUM OF ALL RESPONSES TO PHQ9 QUESTIONS 1 & 2: 0
SUM OF ALL RESPONSES TO PHQ QUESTIONS 1-9: 0
SUM OF ALL RESPONSES TO PHQ QUESTIONS 1-9: 0

## 2023-03-01 NOTE — PROGRESS NOTES
[address] [facility]  [address] [address]  Phone: [phone]  Fax: [phone]      Eugenia Martínez is a 48 y.o. female who presents to the [facility] or [address] [place] [address] clinic today for her medical conditions/complaints as noted below. Eugenia Martínez is c/o of Foot Pain (Pt presnets with complaints of right foot pain since yesterday. Pt IS UNAWARE OF ANY INJURY THAT OCCURRED TO THE FOOT. Pt was at work yesterday when all of a sudden her foot began to hurt really bad, Pt says she went home early and even had to call off today.)      Assessment and Plan     1. Right foot pain  - XR FOOT RIGHT (MIN 3 VIEWS); Future    Will obtain X rays of the right foot to rule out fracture and call patient with results. Discussed possible referral to podiatry depending on X ray findings. She has seen Dr. Yadira Vargas in the past.    Called and notified patient of X ray findings. No acute bony findings. She was encouraged to use NSAIDs and ice as tolerated for comfort. May use ace wrap or wear compression socks to help reduce swelling. Follow up with PCP. XR FOOT RIGHT (MIN 3 VIEWS)  Narrative: EXAMINATION:  THREE XRAY VIEWS OF THE RIGHT FOOT    3/1/2023 9:12 am    COMPARISON:  None. HISTORY:  ORDERING SYSTEM PROVIDED HISTORY: Right foot pain  TECHNOLOGIST PROVIDED HISTORY:  Pain to lateral aspect of foot along 5th digit. Worse with weight bearing. Reason for Exam: Pain and swelling around right 4th/5th metatarsals. Started  yesterday while walking, no known injury. Pain worse today with  weightbearing. FINDINGS:  No stress or traumatic fractures are detected within the right foot. Lisfranc joint is congruent on these nonweightbearing images. Degenerative changes are noted within the midfoot, moderate degree. Circumferential soft tissue swelling is noted, greater dorsally and  laterally. No soft tissue gas or radiopaque foreign body.     On the lateral examination, subtalar joint unremarkable. Moderate calcaneal  enthesopathy noted plantarly. Impression: Soft tissue swelling. No acute bony abnormality. Subjective:   Елена Duncan reports that while at work yesterday she was trying a heel cup on the left foot to help with chronic type heal pain when after lunch break she went to get up to go back to work and had significant pain to the top lateral portion of the right foot. She reports wearing a newer pair of tennis shoes but no known injury of movement that precipitated the pain to the top of the right foot. She was not wearing the heel cup on the right heel, only the left. No prior injuries to the right foot reported. Foot Pain   Pain location: top of right foot. This is a new problem. The current episode started yesterday. There has been no history of extremity trauma. The problem occurs intermittently. The problem has been unchanged. The quality of the pain is described as aching and sharp (Sharp pain with weight bearing). Associated symptoms include an inability to bear weight. Pertinent negatives include no limited range of motion, numbness, stiffness or tingling. The symptoms are aggravated by standing. She has tried NSAIDS and cold for the symptoms. The treatment provided mild relief. Family history does not include gout. Review of Systems   Constitutional:  Positive for activity change. Musculoskeletal:  Positive for arthralgias (right top lateral side of right foot. ). Negative for joint swelling and stiffness. Neurological:  Negative for tingling and numbness. All other systems reviewed and are negative. Past Medical History:   Past Medical History:   Diagnosis Date    Gall bladder disease     Heartburn     Morbid obesity with BMI of 40.0-44.9, adult [facility])         Past Surgical History:  has a past surgical history that includes Tubal ligation (2012); other surgical history (2013); other surgical history (11/2009);  Cholecystectomy, laparoscopic (8/18/2014); and Hand surgery (Right, 10/11/2022).     Allergies: No Known Allergies    Social History:  reports that she quit smoking about 13 years ago. Her smoking use included cigarettes. She has never used smokeless tobacco. She reports that she does not drink alcohol and does not use drugs.     Objective:     Vitals:    03/01/23 1122   BP: 132/86   Site: Right Upper Arm   Position: Sitting   Cuff Size: Large Adult   Pulse: 81   Resp: 18   Temp: 98.1 °F (36.7 °C)   TempSrc: Tympanic   SpO2: 97%   Weight: (!) 347 lb (157.4 kg)   Height: 5' 6\" (1.676 m)     Body mass index is 56.01 kg/m².    /86 (Site: Right Upper Arm, Position: Sitting, Cuff Size: Large Adult)   Pulse 81   Temp 98.1 °F (36.7 °C) (Tympanic)   Resp 18   Ht 5' 6\" (1.676 m)   Wt (!) 347 lb (157.4 kg)   SpO2 97%   BMI 56.01 kg/m²   Physical Exam  Constitutional:       Appearance: She is obese.   Eyes:      Pupils: Pupils are equal, round, and reactive to light.   Cardiovascular:      Rate and Rhythm: Normal rate and regular rhythm.      Pulses: Normal pulses.           Dorsalis pedis pulses are 2+ on the right side.        Posterior tibial pulses are 2+ on the right side.   Pulmonary:      Effort: Pulmonary effort is normal.   Musculoskeletal:         General: Tenderness (dorsum of the right foot along the lateral aspect. Slight swelling and tenderness with palpation. More pain with weight bearing.) present. No signs of injury.      Right foot: Normal range of motion. No deformity, bunion, foot drop or prominent metatarsal heads.        Feet:    Feet:      Right foot:      Skin integrity: Skin integrity normal.      Toenail Condition: Right toenails are normal.      Left foot:      Skin integrity: Skin integrity normal.      Toenail Condition: Left toenails are normal.   Skin:     General: Skin is warm.      Capillary Refill: Capillary refill takes less than 2 seconds.      Findings: No erythema.   Neurological:       General: No focal deficit present. Mental Status: She is alert. Psychiatric:         Mood and Affect: Mood normal.         Behavior: Behavior normal.         Discussed exam, POCT findings, plan of care, and follow-up at length with patient and/or their caregiver. Reviewed all prescribed and recommended medications, administration and side effects. Encouraged patient to follow up with PCP or return to the clinic for no improvement and or worsening of symptoms. All questions were addressed and answered with verbalization of understanding. The patient and/or the caregiver was agreeable with the plan.      Electronically signed by ADITI Fernández CNP on 3/1/2023 at 11:36 AM

## 2023-03-03 ENCOUNTER — TELEPHONE (OUTPATIENT)
Dept: PRIMARY CARE CLINIC | Age: 53
End: 2023-03-03

## 2023-03-03 DIAGNOSIS — M79.671 RIGHT FOOT PAIN: Primary | ICD-10-CM

## 2023-03-03 RX ORDER — NAPROXEN 500 MG/1
500 TABLET ORAL 2 TIMES DAILY PRN
Qty: 60 TABLET | Refills: 0 | Status: SHIPPED | OUTPATIENT
Start: 2023-03-03 | End: 2023-07-20 | Stop reason: ALTCHOICE

## 2023-03-06 DIAGNOSIS — Z12.31 SCREENING MAMMOGRAM FOR BREAST CANCER: Primary | ICD-10-CM

## 2023-03-10 ENCOUNTER — OFFICE VISIT (OUTPATIENT)
Dept: FAMILY MEDICINE CLINIC | Age: 53
End: 2023-03-10
Payer: MEDICAID

## 2023-03-10 VITALS
TEMPERATURE: 98.6 F | WEIGHT: 293 LBS | SYSTOLIC BLOOD PRESSURE: 130 MMHG | HEART RATE: 78 BPM | DIASTOLIC BLOOD PRESSURE: 84 MMHG | BODY MASS INDEX: 58.11 KG/M2 | OXYGEN SATURATION: 98 %

## 2023-03-10 DIAGNOSIS — Z12.11 SCREENING FOR COLON CANCER: ICD-10-CM

## 2023-03-10 DIAGNOSIS — Z23 NEED FOR INFLUENZA VACCINATION: ICD-10-CM

## 2023-03-10 DIAGNOSIS — E66.01 MORBID OBESITY WITH BMI OF 50.0-59.9, ADULT (HCC): Primary | ICD-10-CM

## 2023-03-10 PROCEDURE — 99212 OFFICE O/P EST SF 10 MIN: CPT | Performed by: NURSE PRACTITIONER

## 2023-03-10 PROCEDURE — 90674 CCIIV4 VAC NO PRSV 0.5 ML IM: CPT | Performed by: NURSE PRACTITIONER

## 2023-03-10 NOTE — PATIENT INSTRUCTIONS
Schedule physical with PAP smear. Have colonoscopy completed. See dietician for weight loss assistance. Exercise 300 minutes per week. Start with just walking. Influenza vaccine today. Follow up as needed. Paperwork completed for [employer].

## 2023-03-10 NOTE — PROGRESS NOTES
[address], APRN-CNP  [address]  Phone:  [phone]  Fax:  [phone]  Radha Pineda is a 48 y.o. female who presents today for her medical conditions/complaints as noted below. Radha Pineda c/o of Established New Doctor      HPI:     HPI  Right foot pain. Saw Luisa Lau. Taking Naproxen and has compression socks on order.     Wt Readings from Last 3 Encounters:   03/10/23 (!) 360 lb (163.3 kg)   03/01/23 (!) 347 lb (157.4 kg)   12/27/22 (!) 347 lb (157.4 kg)       Temp Readings from Last 3 Encounters:   03/10/23 98.6 °F (37 °C) (Tympanic)   03/01/23 98.1 °F (36.7 °C) (Tympanic)   10/25/22 98.1 °F (36.7 °C)       BP Readings from Last 3 Encounters:   03/10/23 130/84   03/01/23 132/86   12/27/22 132/78       Pulse Readings from Last 3 Encounters:   03/10/23 78   03/01/23 81   12/27/22 71        SpO2 Readings from Last 3 Encounters:   03/10/23 98%   03/01/23 97%   12/27/22 97%             Past Medical History:   Diagnosis Date    Gall bladder disease     Heartburn     Morbid obesity with BMI of 40.0-44.9, adult [facility])       Past Surgical History:   Procedure Laterality Date    CHOLECYSTECTOMY, LAPAROSCOPIC  8/18/2014    with OP GRAMS    HAND SURGERY Right 10/11/2022    Excision Cyst Right Long Finger performed by Timothy Arnold MD at [address] [place] [address]  2013    full mouth tooth extraction    OTHER SURGICAL HISTORY  11/2009    perineal suture repair following childbirth    TUBAL LIGATION  2012     Family History   Problem Relation Age of Onset    High Blood Pressure Mother     Other Mother         Glaucoma    Diabetes Mother     Cancer Father         lung    Heart Disease Sister     Stroke Sister     Heart Disease Brother     Diabetes Paternal Grandmother     Cancer Paternal Grandmother         breast    Heart Disease Paternal Grandfather      Social History     Tobacco Use    Smoking status: Former     Packs/day: 0.00     Years: 10.00     Pack years: 0.00     Types: Cigarettes     Quit date: 2009     Years since quittin.7    Smokeless tobacco: Never   Substance Use Topics    Alcohol use: No      Current Outpatient Medications   Medication Sig Dispense Refill    naproxen (NAPROSYN) 500 MG tablet Take 1 tablet by mouth 2 times daily as needed for Pain 60 tablet 0     No current facility-administered medications for this visit. No Known Allergies    No results found. Subjective:      Review of Systems  The 10-year ASCVD risk score (Marshall OLIVAS, et al., 2019) is: 1.4%    Values used to calculate the score:      Age: 48 years      Sex: Female      Is Non- : No      Diabetic: No      Tobacco smoker: No      Systolic Blood Pressure: 381 mmHg      Is BP treated: No      HDL Cholesterol: 59 mg/dL      Total Cholesterol: 210 mg/dL    Objective:     /84 (Site: Right Upper Arm, Position: Sitting, Cuff Size: Large Adult)   Pulse 78   Temp 98.6 °F (37 °C) (Tympanic)   Wt (!) 360 lb (163.3 kg)   LMP 2023 (Exact Date)   SpO2 98%   BMI 58.11 kg/m²     Physical Exam  Vitals and nursing note reviewed. Constitutional:       General: She is not in acute distress. Appearance: Normal appearance. She is well-developed. She is obese. She is not ill-appearing, toxic-appearing or diaphoretic. HENT:      Head: Normocephalic. Right Ear: Tympanic membrane, ear canal and external ear normal.      Left Ear: Tympanic membrane, ear canal and external ear normal.      Nose: Nose normal.      Mouth/Throat:      Mouth: Mucous membranes are moist.      Pharynx: Oropharynx is clear. Eyes:      General: No scleral icterus. Right eye: No discharge. Left eye: No discharge. Conjunctiva/sclera: Conjunctivae normal.   Cardiovascular:      Rate and Rhythm: Normal rate and regular rhythm. Pulses: Normal pulses. Heart sounds: Normal heart sounds.    Pulmonary:      Effort: Pulmonary effort is normal. No respiratory distress. Breath sounds: Normal breath sounds. Musculoskeletal:         General: Normal range of motion. Cervical back: Normal range of motion and neck supple. Skin:     General: Skin is warm and dry. Capillary Refill: Capillary refill takes less than 2 seconds. Neurological:      Mental Status: She is alert and oriented to person, place, and time. Psychiatric:         Mood and Affect: Mood normal.         Speech: Speech normal.         Behavior: Behavior normal.         Thought Content: Thought content normal.         Judgment: Judgment normal.     .  Assessment:      Diagnosis Orders   1. Morbid obesity with BMI of 50.0-59.9, adult [facility])  External Referral To Nutrition Services      2. Need for influenza vaccination  Influenza, FLUCELVAX, (age 10 mo+), IM, Preservative Free, 0.5 mL      3. Screening for colon cancer  Kate Mcburney, MD, General Surgery, [place]        No results found for this visit on 03/10/23. Plan:       Schedule physical with PAP smear. Have colonoscopy completed. See dietician for weight loss assistance. Exercise 300 minutes per week. Start with just walking. Influenza vaccine today. Follow up as needed. Paperwork completed for [place] from earlier in the month. Patient Instructions   Schedule physical with PAP smear. Have colonoscopy completed. See dietician for weight loss assistance. Exercise 300 minutes per week. Start with just walking. Influenza vaccine today. Follow up as needed. Paperwork completed for [employer]. Patient/Caregiver instructed on use, benefit, and side effects of prescribed medications. All patient/parent/caregiver questions answered. Patient/parent/caregiver voiced understanding. Reviewed health maintenance. Instructed to continue current medications, diet and exercise. Patient agreed with treatment plan. Follow up as directed.            Electronically signed by Kellee Cash Roscoe Greene - ANIA on3/10/2023

## 2023-07-20 ENCOUNTER — OFFICE VISIT (OUTPATIENT)
Dept: FAMILY MEDICINE CLINIC | Age: 53
End: 2023-07-20
Payer: COMMERCIAL

## 2023-07-20 ENCOUNTER — TELEPHONE (OUTPATIENT)
Dept: FAMILY MEDICINE CLINIC | Age: 53
End: 2023-07-20

## 2023-07-20 VITALS
RESPIRATION RATE: 16 BRPM | HEART RATE: 76 BPM | WEIGHT: 293 LBS | TEMPERATURE: 98.6 F | BODY MASS INDEX: 47.09 KG/M2 | OXYGEN SATURATION: 96 % | DIASTOLIC BLOOD PRESSURE: 96 MMHG | HEIGHT: 66 IN | SYSTOLIC BLOOD PRESSURE: 156 MMHG

## 2023-07-20 DIAGNOSIS — M79.661 PAIN AND SWELLING OF RIGHT LOWER LEG: Primary | ICD-10-CM

## 2023-07-20 DIAGNOSIS — R03.0 ELEVATED BLOOD PRESSURE READING: ICD-10-CM

## 2023-07-20 DIAGNOSIS — R79.89 ELEVATED D-DIMER: ICD-10-CM

## 2023-07-20 DIAGNOSIS — M79.89 PAIN AND SWELLING OF RIGHT LOWER LEG: Primary | ICD-10-CM

## 2023-07-20 PROBLEM — M86.9 OSTEOMYELITIS, UNSPECIFIED (HCC): Status: ACTIVE | Noted: 2021-10-27

## 2023-07-20 PROBLEM — S52.123A CLOSED FRACTURE OF HEAD OF RADIUS: Status: ACTIVE | Noted: 2022-10-22

## 2023-07-20 LAB — D-DIMER QUANTITATIVE: 341.15 NG/ML (ref 0–245)

## 2023-07-20 PROCEDURE — 99213 OFFICE O/P EST LOW 20 MIN: CPT | Performed by: NURSE PRACTITIONER

## 2023-07-20 RX ORDER — MELOXICAM 15 MG/1
15 TABLET ORAL DAILY
Qty: 30 TABLET | Refills: 0 | Status: SHIPPED | OUTPATIENT
Start: 2023-07-20

## 2023-07-20 ASSESSMENT — PATIENT HEALTH QUESTIONNAIRE - PHQ9
SUM OF ALL RESPONSES TO PHQ QUESTIONS 1-9: 0
SUM OF ALL RESPONSES TO PHQ9 QUESTIONS 1 & 2: 0
1. LITTLE INTEREST OR PLEASURE IN DOING THINGS: 0
SUM OF ALL RESPONSES TO PHQ QUESTIONS 1-9: 0
SUM OF ALL RESPONSES TO PHQ QUESTIONS 1-9: 0
2. FEELING DOWN, DEPRESSED OR HOPELESS: 0
SUM OF ALL RESPONSES TO PHQ QUESTIONS 1-9: 0

## 2023-07-20 ASSESSMENT — ENCOUNTER SYMPTOMS
SHORTNESS OF BREATH: 0
COUGH: 0

## 2023-07-20 NOTE — TELEPHONE ENCOUNTER
Patient was notified that ultrasound was negative. She will start rx and keep us informed as to her progress.

## 2023-07-20 NOTE — PROGRESS NOTES
[facility], [facility]  [address], [facility] Yuliya Pollard,[facility]  [phone]        7/20/2023     Stephen Andino is a 48 y.o. female, here for evaluation of the following medical concerns:    Chief Complaint   Patient presents with    Leg Pain     Right heel and calf pain. Sx started around 7/8 or 9. It hurts to walk and to touch. Initially it was the whole calf that hurt, now it is lower calf and heel. She has tried Ibuprofen and pain patches, icy hot. She gives the pain a 10 especially after a work day. Leg Pain   The incident occurred more than 1 week ago. There was no injury mechanism. The pain is present in the right leg. The quality of the pain is described as aching. The pain is at a severity of 6/10 (by end of day needs to use shopping cart to walk at work. ). The pain has been Fluctuating since onset. Pertinent negatives include no loss of motion, loss of sensation, numbness or tingling. Inability to bear weight: pain with prolonged weight bearing. .She reports no foreign bodies present. The symptoms are aggravated by weight bearing. She has tried NSAIDs (ibuprofen 600 mg bid) for the symptoms. The treatment provided no relief. Patient Active Problem List   Diagnosis    Morbid obesity with BMI of 50.0-59.9, adult ([address])    Pain in joint, lower leg    Chondromalacia of patella    Closed fracture of head of radius    Osteomyelitis, unspecified ([address])       Patient's recent lab reports are as follows:      Results for orders placed or performed in visit on 07/20/23   D-Dimer, Quantitative   Result Value Ref Range    D-Dimer, Quant 341.149 (H) 0 - 245 ng/ml     Other review of systems are as noted below. Preventative measures are reviewed today. See health maintenance section for complete preventative plan of care. Did review patient's med list, allergies, social history, fam history, pmhx and pshx today as noted in the record.       Review of Systems

## 2024-02-08 ENCOUNTER — OFFICE VISIT (OUTPATIENT)
Dept: FAMILY MEDICINE CLINIC | Age: 54
End: 2024-02-08
Payer: COMMERCIAL

## 2024-02-08 VITALS
SYSTOLIC BLOOD PRESSURE: 128 MMHG | WEIGHT: 293 LBS | OXYGEN SATURATION: 99 % | HEART RATE: 70 BPM | DIASTOLIC BLOOD PRESSURE: 90 MMHG | TEMPERATURE: 97.7 F | HEIGHT: 65 IN | BODY MASS INDEX: 48.82 KG/M2

## 2024-02-08 DIAGNOSIS — J06.9 VIRAL UPPER RESPIRATORY ILLNESS: Primary | ICD-10-CM

## 2024-02-08 DIAGNOSIS — R51.9 SINUS HEADACHE: ICD-10-CM

## 2024-02-08 DIAGNOSIS — H92.03 OTALGIA OF BOTH EARS: ICD-10-CM

## 2024-02-08 DIAGNOSIS — R05.1 ACUTE COUGH: ICD-10-CM

## 2024-02-08 DIAGNOSIS — J02.9 SORE THROAT: ICD-10-CM

## 2024-02-08 LAB
INFLUENZA A ANTIGEN, POC: NEGATIVE
INFLUENZA B ANTIGEN, POC: NEGATIVE
LOT EXPIRE DATE: NORMAL
LOT KIT NUMBER: NORMAL
S PYO AG THROAT QL: NORMAL
SARS-COV-2, POC: NORMAL
VALID INTERNAL CONTROL: NORMAL
VENDOR AND KIT NAME POC: NORMAL

## 2024-02-08 PROCEDURE — 99213 OFFICE O/P EST LOW 20 MIN: CPT | Performed by: NURSE PRACTITIONER

## 2024-02-08 PROCEDURE — 87428 SARSCOV & INF VIR A&B AG IA: CPT | Performed by: NURSE PRACTITIONER

## 2024-02-08 PROCEDURE — 87880 STREP A ASSAY W/OPTIC: CPT | Performed by: NURSE PRACTITIONER

## 2024-02-08 RX ORDER — BENZONATATE 100 MG/1
100 CAPSULE ORAL 3 TIMES DAILY PRN
Qty: 30 CAPSULE | Refills: 0 | Status: SHIPPED | OUTPATIENT
Start: 2024-02-08 | End: 2024-02-18

## 2024-02-08 ASSESSMENT — ENCOUNTER SYMPTOMS
COUGH: 1
SORE THROAT: 1
SINUS PRESSURE: 1
RHINORRHEA: 1

## 2024-02-08 NOTE — PROGRESS NOTES
normal. There is no impacted cerumen.      Nose: Mucosal edema, congestion and rhinorrhea present.      Right Turbinates: Swollen.      Left Turbinates: Swollen.      Mouth/Throat:      Lips: Pink.      Mouth: Mucous membranes are moist.      Pharynx: Uvula midline. Posterior oropharyngeal erythema present.      Tonsils: No tonsillar abscesses.      Comments: Moderate amount of mucus noted in the pharynx.  Eyes:      Conjunctiva/sclera: Conjunctivae normal.      Pupils: Pupils are equal, round, and reactive to light.   Cardiovascular:      Rate and Rhythm: Normal rate and regular rhythm.      Pulses: Normal pulses.      Heart sounds: Normal heart sounds.   Pulmonary:      Effort: Pulmonary effort is normal. No respiratory distress.      Breath sounds: Normal breath sounds. No decreased air movement. No decreased breath sounds, wheezing, rhonchi or rales.   Musculoskeletal:         General: Normal range of motion.      Cervical back: Normal range of motion.   Lymphadenopathy:      Cervical: No cervical adenopathy.      Right cervical: No superficial or posterior cervical adenopathy.     Left cervical: No superficial or posterior cervical adenopathy.      Upper Body:      Right upper body: No supraclavicular adenopathy.      Left upper body: No supraclavicular adenopathy.   Skin:     General: Skin is warm and dry.      Capillary Refill: Capillary refill takes less than 2 seconds.   Neurological:      General: No focal deficit present.      Mental Status: She is alert and oriented to person, place, and time. Mental status is at baseline.   Psychiatric:         Mood and Affect: Mood normal.         Behavior: Behavior normal.           Discussed exam, POCT findings, plan of care, and follow-up at length with patient and/or their caregiver.  Reviewed all prescribed and recommended medications, administration and side effects. Encouraged patient to follow up with PCP or return to the clinic for no improvement and or

## 2024-02-08 NOTE — PATIENT INSTRUCTIONS
Recommended over the counter medications/treatments:  The use of an antihistamine (Claritin or Zyrtec) may help with sinus congestion and drainage.   A nasal steroid spray (Flonase or Nasacort) should be used to help decrease swelling and irritation in the sinuses to reduce congestion and drainage.   Mucinex (12 hour) (Guaifenesin) will also help to thin mucus so that it drains easier and improves congestion. Works best if you are drinking plenty of water.  Alternating hot liquids with cold liquids helps to soothe a sore throat.  Use Acetaminophen (Tylenol) to help relieve fever, chills or body aches. If allowed, you may alternate using ibuprofen (Motrin) and Tylenol. Do not exceed 3,000mg of Tylenol in a 24 hour time period.  Use of liquid ibuprofen (Children's Motrin) may be gargled and then swallowed to help with sore throat pain in both adult and children.  Make sure to stay well hydrated by drinking plenty of water.  Follow up with primary care provider in 2 to 3 days or as needed if no improvement or worsening of your symptoms.     Tylenol Sinus (Acetaminophen and Phenylephrine) works best for sinus headaches.  Phenylephrine is a nasal decongestant that helps relieve sinus pressure and pain.   Each tablet has Acetaminophen 325 mg and Phenylephrine 5 mg.  Typical dose is 2 tablets every 4-6 hours as needed.  You may find Phenylephrine 10 mg tablets only as well. (Sudafed PE brand name) One tablet every 4-6 hours as needed.

## 2024-02-29 ENCOUNTER — OFFICE VISIT (OUTPATIENT)
Dept: FAMILY MEDICINE CLINIC | Age: 54
End: 2024-02-29
Payer: COMMERCIAL

## 2024-02-29 VITALS
OXYGEN SATURATION: 99 % | HEIGHT: 68 IN | RESPIRATION RATE: 20 BRPM | DIASTOLIC BLOOD PRESSURE: 100 MMHG | BODY MASS INDEX: 44.41 KG/M2 | TEMPERATURE: 97.9 F | HEART RATE: 71 BPM | SYSTOLIC BLOOD PRESSURE: 136 MMHG | WEIGHT: 293 LBS

## 2024-02-29 DIAGNOSIS — J03.90 EXUDATIVE TONSILLITIS: Primary | ICD-10-CM

## 2024-02-29 DIAGNOSIS — R03.0 ELEVATED BLOOD PRESSURE READING: ICD-10-CM

## 2024-02-29 DIAGNOSIS — E66.01 MORBID OBESITY WITH BMI OF 50.0-59.9, ADULT (HCC): ICD-10-CM

## 2024-02-29 DIAGNOSIS — Z23: ICD-10-CM

## 2024-02-29 DIAGNOSIS — Z12.11 COLON CANCER SCREENING: ICD-10-CM

## 2024-02-29 DIAGNOSIS — Z12.31 ENCOUNTER FOR SCREENING MAMMOGRAM FOR MALIGNANT NEOPLASM OF BREAST: ICD-10-CM

## 2024-02-29 PROBLEM — S52.123A CLOSED FRACTURE OF HEAD OF RADIUS: Status: RESOLVED | Noted: 2022-10-22 | Resolved: 2024-02-29

## 2024-02-29 PROCEDURE — 99214 OFFICE O/P EST MOD 30 MIN: CPT | Performed by: NURSE PRACTITIONER

## 2024-02-29 PROCEDURE — 90674 CCIIV4 VAC NO PRSV 0.5 ML IM: CPT | Performed by: NURSE PRACTITIONER

## 2024-02-29 PROCEDURE — 90471 IMMUNIZATION ADMIN: CPT | Performed by: NURSE PRACTITIONER

## 2024-02-29 RX ORDER — AMOXICILLIN AND CLAVULANATE POTASSIUM 875; 125 MG/1; MG/1
1 TABLET, FILM COATED ORAL 2 TIMES DAILY
Qty: 20 TABLET | Refills: 0 | Status: SHIPPED | OUTPATIENT
Start: 2024-02-29 | End: 2024-03-10

## 2024-02-29 ASSESSMENT — PATIENT HEALTH QUESTIONNAIRE - PHQ9
2. FEELING DOWN, DEPRESSED OR HOPELESS: 0
1. LITTLE INTEREST OR PLEASURE IN DOING THINGS: 0
SUM OF ALL RESPONSES TO PHQ QUESTIONS 1-9: 0
SUM OF ALL RESPONSES TO PHQ QUESTIONS 1-9: 0
SUM OF ALL RESPONSES TO PHQ9 QUESTIONS 1 & 2: 0
SUM OF ALL RESPONSES TO PHQ QUESTIONS 1-9: 0
SUM OF ALL RESPONSES TO PHQ QUESTIONS 1-9: 0

## 2024-02-29 NOTE — PROGRESS NOTES
social history, fam history, pmhx and pshx today as noted in the record.      Review of Systems   Constitutional:  Negative for fever.   HENT:  Negative for trouble swallowing.    Musculoskeletal:  Positive for neck pain.   Neurological:  Negative for headaches.         Prior to Visit Medications    Medication Sig Taking? Authorizing Provider   amoxicillin-clavulanate (AUGMENTIN) 875-125 MG per tablet Take 1 tablet by mouth 2 times daily for 10 days Yes Elda Garcia APRN - NP        Social History     Tobacco Use    Smoking status: Former     Current packs/day: 0.00     Types: Cigarettes     Quit date: 1999     Years since quittin.7    Smokeless tobacco: Never   Substance Use Topics    Alcohol use: No        Wt Readings from Last 3 Encounters:   24 (!) 163 kg (359 lb 6.4 oz)   24 (!) 152 kg (335 lb)   23 (!) 163.6 kg (360 lb 9.6 oz)     Temp Readings from Last 3 Encounters:   24 97.9 °F (36.6 °C)   24 97.7 °F (36.5 °C)   23 98.6 °F (37 °C)     BP Readings from Last 3 Encounters:   24 (!) 138/98   24 (!) 128/90   23 (!) 156/96     Pulse Readings from Last 3 Encounters:   24 71   24 70   23 76      Resp Readings from Last 3 Encounters:   24 20   23 16   23 18     PF Readings from Last 3 Encounters:   No data found for PF     @LASTSAO2(3)@   Estimated body mass index is 54.81 kg/m² as calculated from the following:    Height as of this encounter: 1.725 m (5' 7.9\").    Weight as of this encounter: 163 kg (359 lb 6.4 oz).       2024     8:20 AM 2023     8:45 AM 3/1/2023    11:21 AM   PHQ-9    Little interest or pleasure in doing things 0 0 0   Feeling down, depressed, or hopeless 0 0 0   PHQ-2 Score 0 0 0   PHQ-9 Total Score 0 0 0           No data to display                   Physical Exam  Vitals and nursing note reviewed.   Constitutional:       General: She is not in acute distress.     Appearance:

## 2024-02-29 NOTE — PATIENT INSTRUCTIONS
Monitor blood pressure for the next 2 weeks and drop off the numbers for review.  If it continues to be running high we will need to look at starting medication.  Recommend exercise 150 minutes per week and restricting sugar in the diet.  Augmentin twice daily for throat infection.  Have mammogram done.  Complete Cologuard kit.  Influenza vaccine today.

## 2024-03-04 PROBLEM — R03.0 ELEVATED BLOOD PRESSURE READING: Status: ACTIVE | Noted: 2024-03-04

## 2024-04-12 ENCOUNTER — COMMUNITY OUTREACH (OUTPATIENT)
Dept: FAMILY MEDICINE CLINIC | Age: 54
End: 2024-04-12

## 2024-04-12 NOTE — PROGRESS NOTES
Patient's HM shows they are overdue for CRC and Mammogram.  Flickr and  files searched with/without success.    Had order for mamm and appt scheduled for 3/21/24, pt no showed.    Cologuard ordered placed 2/29/24 , no results received yet.

## 2024-06-25 ENCOUNTER — OFFICE VISIT (OUTPATIENT)
Dept: FAMILY MEDICINE CLINIC | Age: 54
End: 2024-06-25
Payer: COMMERCIAL

## 2024-06-25 VITALS
BODY MASS INDEX: 47.09 KG/M2 | RESPIRATION RATE: 16 BRPM | TEMPERATURE: 97.4 F | HEART RATE: 71 BPM | WEIGHT: 293 LBS | DIASTOLIC BLOOD PRESSURE: 88 MMHG | HEIGHT: 66 IN | SYSTOLIC BLOOD PRESSURE: 132 MMHG | OXYGEN SATURATION: 97 %

## 2024-06-25 DIAGNOSIS — M72.2 PLANTAR FASCIITIS: Primary | ICD-10-CM

## 2024-06-25 PROBLEM — M86.9 OSTEOMYELITIS, UNSPECIFIED (HCC): Status: RESOLVED | Noted: 2021-10-27 | Resolved: 2024-06-25

## 2024-06-25 PROCEDURE — 99213 OFFICE O/P EST LOW 20 MIN: CPT | Performed by: NURSE PRACTITIONER

## 2024-06-25 RX ORDER — MELOXICAM 15 MG/1
15 TABLET ORAL DAILY
Qty: 30 TABLET | Refills: 0 | Status: SHIPPED | OUTPATIENT
Start: 2024-06-25

## 2024-06-25 RX ORDER — M-VIT,TX,IRON,MINS/CALC/FOLIC 27MG-0.4MG
1 TABLET ORAL DAILY
COMMUNITY

## 2024-06-25 SDOH — ECONOMIC STABILITY: FOOD INSECURITY: WITHIN THE PAST 12 MONTHS, THE FOOD YOU BOUGHT JUST DIDN'T LAST AND YOU DIDN'T HAVE MONEY TO GET MORE.: NEVER TRUE

## 2024-06-25 SDOH — ECONOMIC STABILITY: FOOD INSECURITY: WITHIN THE PAST 12 MONTHS, YOU WORRIED THAT YOUR FOOD WOULD RUN OUT BEFORE YOU GOT MONEY TO BUY MORE.: NEVER TRUE

## 2024-06-25 SDOH — ECONOMIC STABILITY: INCOME INSECURITY: HOW HARD IS IT FOR YOU TO PAY FOR THE VERY BASICS LIKE FOOD, HOUSING, MEDICAL CARE, AND HEATING?: NOT HARD AT ALL

## 2024-06-25 ASSESSMENT — PATIENT HEALTH QUESTIONNAIRE - PHQ9
SUM OF ALL RESPONSES TO PHQ QUESTIONS 1-9: 0
SUM OF ALL RESPONSES TO PHQ9 QUESTIONS 1 & 2: 0
SUM OF ALL RESPONSES TO PHQ QUESTIONS 1-9: 0
SUM OF ALL RESPONSES TO PHQ QUESTIONS 1-9: 0
2. FEELING DOWN, DEPRESSED OR HOPELESS: NOT AT ALL
1. LITTLE INTEREST OR PLEASURE IN DOING THINGS: NOT AT ALL
SUM OF ALL RESPONSES TO PHQ QUESTIONS 1-9: 0

## 2024-06-25 ASSESSMENT — ENCOUNTER SYMPTOMS: COLOR CHANGE: 0

## 2024-06-25 NOTE — PATIENT INSTRUCTIONS
Frozen water bottles and roll your foot on them - 4 times a day for 20 minutes each time.  Meloxicam daily.  Give this 7 days.  If not improved give me a call and will try steroid.  Follow up with primary care provider in 1 to 2 days if needed.

## 2024-06-25 NOTE — PROGRESS NOTES
Elda Garcia, [place] [address]  [phone]        2024     Wen Morton is a 54 y.o. female, here for evaluation of the following medical concerns:    Chief Complaint   Patient presents with    Foot Pain     Left foot pain for 2 days.  Bottom of foot is swollen. No known injury. She thought it was her shoe insoles at first.         Foot Pain   The pain is present in the left foot. This is a new problem. The current episode started in the past 7 days (2 days). There has been no history of extremity trauma. The problem occurs intermittently. The problem has been waxing and waning. The quality of the pain is described as aching. The pain is at a severity of 5/10. Pertinent negatives include no fever. The symptoms are aggravated by activity. She has tried acetaminophen for the symptoms. The treatment provided no relief.       Patient Active Problem List   Diagnosis    Morbid obesity with BMI of 50.0-59.9, adult ([facility])    Chondromalacia of patella    Elevated blood pressure reading       Patient's recent lab reports are as follows:      Results for orders placed or performed in visit on 24   POCT rapid strep A   Result Value Ref Range    Strep A Ag None Detected None Detected   POCT COVID-19 & Influenza A/B   Result Value Ref Range    Internal Control n/a     Lot/Kit Number [id number]     Lot/Kit  date: 25     SARS-COV-2, POC Not-Detected Not Detected    Influenza A Antigen, POC Negative     Influenza B Antigen, POC Negative     Vendor and kit name [employer]      Other review of systems are as noted below.    Preventative measures are reviewed today. See health maintenance section for complete preventative plan of care.    Did review patient's med list, allergies, social history, fam history, pmhx and pshx today as noted in the record.      Review of Systems   Constitutional:  Negative for chills and fever.   Musculoskeletal:  Positive

## 2024-07-02 DIAGNOSIS — M72.2 PLANTAR FASCIITIS: Primary | ICD-10-CM

## 2024-07-02 RX ORDER — PREDNISONE 20 MG/1
20 TABLET ORAL 2 TIMES DAILY
Qty: 10 TABLET | Refills: 0 | Status: SHIPPED | OUTPATIENT
Start: 2024-07-02 | End: 2024-07-07

## 2025-04-08 ENCOUNTER — OFFICE VISIT (OUTPATIENT)
Dept: FAMILY MEDICINE CLINIC | Age: 55
End: 2025-04-08
Payer: COMMERCIAL

## 2025-04-08 VITALS
OXYGEN SATURATION: 97 % | DIASTOLIC BLOOD PRESSURE: 90 MMHG | SYSTOLIC BLOOD PRESSURE: 132 MMHG | HEART RATE: 81 BPM | BODY MASS INDEX: 47.09 KG/M2 | TEMPERATURE: 98 F | HEIGHT: 66 IN | WEIGHT: 293 LBS

## 2025-04-08 DIAGNOSIS — J02.9 SORE THROAT: ICD-10-CM

## 2025-04-08 DIAGNOSIS — J02.9 ACUTE VIRAL PHARYNGITIS: Primary | ICD-10-CM

## 2025-04-08 LAB — S PYO AG THROAT QL: NORMAL

## 2025-04-08 PROCEDURE — G8427 DOCREV CUR MEDS BY ELIG CLIN: HCPCS

## 2025-04-08 PROCEDURE — 99213 OFFICE O/P EST LOW 20 MIN: CPT

## 2025-04-08 PROCEDURE — 87880 STREP A ASSAY W/OPTIC: CPT

## 2025-04-08 PROCEDURE — PBSHW POCT RAPID STREP A

## 2025-04-08 PROCEDURE — 1036F TOBACCO NON-USER: CPT

## 2025-04-08 PROCEDURE — 99211 OFF/OP EST MAY X REQ PHY/QHP: CPT

## 2025-04-08 PROCEDURE — 3017F COLORECTAL CA SCREEN DOC REV: CPT

## 2025-04-08 PROCEDURE — G8417 CALC BMI ABV UP PARAM F/U: HCPCS

## 2025-04-08 ASSESSMENT — ENCOUNTER SYMPTOMS
SHORTNESS OF BREATH: 0
COUGH: 0
SORE THROAT: 1
ABDOMINAL PAIN: 0
WHEEZING: 0
COLOR CHANGE: 0

## 2025-04-08 NOTE — PROGRESS NOTES
Cardiovascular:  Negative for chest pain.   Gastrointestinal:  Negative for abdominal pain.   Skin:  Negative for color change.              6/25/2024    11:55 AM 2/29/2024     8:20 AM 7/20/2023     8:45 AM 3/1/2023    11:21 AM 6/3/2022    11:15 AM 9/13/2021    10:22 AM 9/15/2020     2:26 PM   PHQ Scores   PHQ2 Score 0 0 0 0 0 0 0   PHQ9 Score 0 0 0 0 0 0 0     Interpretation of Total Score Depression Severity: 1-4 = Minimal depression, 5-9 = Mild depression, 10-14 = Moderate depression, 15-19 = Moderately severe depression, 20-27 = Severe depression     Objective:     Vitals:    04/08/25 1236   BP: (!) 132/90   BP Site: Right Upper Arm   Patient Position: Sitting   BP Cuff Size: Large Adult   Pulse: 81   Temp: 98 °F (36.7 °C)   SpO2: 97%   Weight: (!) 162.8 kg (359 lb)   Height: 1.676 m (5' 6\")        Physical Exam  Vitals and nursing note reviewed.   Constitutional:       Appearance: Normal appearance.   HENT:      Head: Normocephalic.      Nose: Congestion present.      Mouth/Throat:      Mouth: Mucous membranes are moist.      Pharynx: Posterior oropharyngeal erythema present. No oropharyngeal exudate.   Cardiovascular:      Pulses: Normal pulses.   Pulmonary:      Effort: Pulmonary effort is normal.      Breath sounds: Normal breath sounds. No wheezing or rales.   Neurological:      Mental Status: She is alert.           Please note that this chart was generated using voice recognition Dragon dictation software.  Although every effort was made to ensure the accuracy of this automated transcription, some errors in transcription may have occurred.    Electronically signed by ADITI Jimenez CNP on 4/8/2025 at 12:52 PM.

## 2025-06-10 NOTE — LETTER
[facility] / [address] [place] [address]  Phone: [phone]  Fax: [phone]      MIKE Batista NP    Lester Betancur  1970 01/17/22           To Whom it May concern:    Lester Betancur was seen in my clinic on 01/17/22  They may return to work/school after a negative covid test result (results may take up to five days to come back). If their test is positive they will need to quarantine for a total of five days and return to work on 1/20/22. At day six if they are fever free and symptoms have improved they may return to work/school with a mask for an additional five days. If you have any questions or concerns, please don't hesitate to call our office.       Sincerely,            MIKE Batista NP [facility]

## (undated) DEVICE — PACK PROCEDURE SURG EXTREMITY MIN

## (undated) DEVICE — 1200CC GUARDIAN II: Brand: GUARDIAN

## (undated) DEVICE — CHLORAPREP 26ML ORANGE

## (undated) DEVICE — SCRUB DRY SURG EZ SCRUB BRUSH PREOPERATIVE GRN

## (undated) DEVICE — GLOVE SURG SZ 85 L12IN FNGR THK13MIL WHT ISOLEX POLYISOPRENE

## (undated) DEVICE — SOLUTION IV IRRIG POUR BRL 0.9% SODIUM CHL 2F7124

## (undated) DEVICE — GOWN,AURORA,NON-REINFORCED,2XL: Brand: MEDLINE

## (undated) DEVICE — PACK SURG PROC REMINDER N WVN DISPOSABLE BEAC TIME OUT

## (undated) DEVICE — BANDAGE GZ W2XL75IN ST RAYON POLY CNFRM STRTCH LTWT

## (undated) DEVICE — SKIN AFFIX SURG ADHESIVE 72/CS 0.55ML: Brand: MEDLINE

## (undated) DEVICE — GAUZE,SPONGE,4"X4",12PLY,STERILE,LF,2'S: Brand: MEDLINE

## (undated) DEVICE — TOURNIQUET PHLEB L EXSANGUINATING FOR AD DGT TOURNI-COT

## (undated) DEVICE — GLOVE SURG SZ 6 THK91MIL LTX FREE SYN POLYISOPRENE ANTI

## (undated) DEVICE — INTENDED FOR TISSUE SEPARATION, AND OTHER PROCEDURES THAT REQUIRE A SHARP SURGICAL BLADE TO PUNCTURE OR CUT.: Brand: BARD-PARKER ® CARBON RIB-BACK BLADES

## (undated) DEVICE — BANDAGE COMPR W2INXL5YD TAN BRTH SELF ADH WRP W/ HND TEAR

## (undated) DEVICE — DRAPE,U/ SHT,SPLIT,PLAS,STERIL: Brand: MEDLINE

## (undated) DEVICE — BANDAGE ELASTIC SELF-CLS EZE BND 3INX5YD

## (undated) DEVICE — SUTURE ETHLN SZ 3-0 L18IN NONABSORBABLE BLK FS-1 L24MM 3/8 663H